# Patient Record
Sex: MALE | Race: WHITE | Employment: FULL TIME | URBAN - METROPOLITAN AREA
[De-identification: names, ages, dates, MRNs, and addresses within clinical notes are randomized per-mention and may not be internally consistent; named-entity substitution may affect disease eponyms.]

---

## 2017-01-30 ENCOUNTER — ANTI-COAG VISIT (OUTPATIENT)
Dept: FAMILY MEDICINE CLINIC | Facility: CLINIC | Age: 62
End: 2017-01-30

## 2017-01-30 DIAGNOSIS — Z79.01 LONG TERM (CURRENT) USE OF ANTICOAGULANTS: Primary | ICD-10-CM

## 2017-01-30 DIAGNOSIS — I48.91 ATRIAL FIBRILLATION, UNSPECIFIED TYPE (HCC): ICD-10-CM

## 2017-01-30 LAB — INR: 2.6 (ref 0.8–1.2)

## 2017-01-30 PROCEDURE — 99211 OFF/OP EST MAY X REQ PHY/QHP: CPT | Performed by: FAMILY MEDICINE

## 2017-01-30 PROCEDURE — 85610 PROTHROMBIN TIME: CPT | Performed by: FAMILY MEDICINE

## 2017-02-06 ENCOUNTER — TELEPHONE (OUTPATIENT)
Dept: FAMILY MEDICINE CLINIC | Facility: CLINIC | Age: 62
End: 2017-02-06

## 2017-02-06 ENCOUNTER — NURSE ONLY (OUTPATIENT)
Dept: FAMILY MEDICINE CLINIC | Facility: CLINIC | Age: 62
End: 2017-02-06

## 2017-02-06 DIAGNOSIS — Z23 IMMUNIZATION DUE: Primary | ICD-10-CM

## 2017-02-06 PROCEDURE — 90715 TDAP VACCINE 7 YRS/> IM: CPT | Performed by: FAMILY MEDICINE

## 2017-02-06 PROCEDURE — 90471 IMMUNIZATION ADMIN: CPT | Performed by: FAMILY MEDICINE

## 2017-02-06 RX ORDER — LISINOPRIL 10 MG/1
10 TABLET ORAL DAILY
COMMUNITY
Start: 2011-04-06 | End: 2017-10-04

## 2017-02-06 RX ORDER — MELOXICAM 15 MG/1
15 TABLET ORAL DAILY
COMMUNITY
Start: 2016-08-26 | End: 2017-03-06 | Stop reason: ALTCHOICE

## 2017-02-06 RX ORDER — WARFARIN SODIUM 5 MG/1
5 TABLET ORAL
COMMUNITY
Start: 2014-02-05 | End: 2017-07-24

## 2017-02-06 RX ORDER — ROSUVASTATIN CALCIUM 10 MG/1
10 TABLET, COATED ORAL DAILY
COMMUNITY
Start: 2013-05-29 | End: 2017-05-22

## 2017-02-06 RX ORDER — OMEPRAZOLE 20 MG/1
CAPSULE, DELAYED RELEASE ORAL
Qty: 90 CAPSULE | Refills: 3 | Status: SHIPPED | OUTPATIENT
Start: 2017-02-06 | End: 2017-09-06

## 2017-02-06 RX ORDER — FLUTICASONE PROPIONATE 50 MCG
50 SPRAY, SUSPENSION (ML) NASAL
COMMUNITY
Start: 2015-12-21 | End: 2020-04-23 | Stop reason: ALTCHOICE

## 2017-02-06 RX ORDER — OMEPRAZOLE 20 MG/1
20 CAPSULE, DELAYED RELEASE ORAL DAILY
COMMUNITY
Start: 2009-08-15 | End: 2017-03-06

## 2017-02-06 RX ORDER — WARFARIN SODIUM 2.5 MG/1
2.5 TABLET ORAL
COMMUNITY
Start: 2016-01-04 | End: 2018-03-31

## 2017-02-06 RX ORDER — DILTIAZEM HYDROCHLORIDE 60 MG/1
60 TABLET, FILM COATED ORAL DAILY
COMMUNITY
Start: 2014-02-05 | End: 2017-10-04

## 2017-02-06 RX ORDER — CITALOPRAM 20 MG/1
20 TABLET ORAL DAILY
COMMUNITY
Start: 2010-07-06 | End: 2017-03-12

## 2017-02-27 ENCOUNTER — LAB ENCOUNTER (OUTPATIENT)
Dept: LAB | Age: 62
End: 2017-02-27
Attending: FAMILY MEDICINE
Payer: COMMERCIAL

## 2017-02-27 ENCOUNTER — TELEPHONE (OUTPATIENT)
Dept: FAMILY MEDICINE CLINIC | Facility: CLINIC | Age: 62
End: 2017-02-27

## 2017-02-27 DIAGNOSIS — F32.A DEPRESSION: ICD-10-CM

## 2017-02-27 DIAGNOSIS — E78.5 HYPERLIPIDEMIA: Primary | ICD-10-CM

## 2017-02-27 DIAGNOSIS — I10 HYPERTENSION, BENIGN: ICD-10-CM

## 2017-02-27 DIAGNOSIS — E11.9 DIABETES MELLITUS TYPE II, CONTROLLED (HCC): ICD-10-CM

## 2017-02-27 LAB
ALBUMIN SERPL-MCNC: 3.6 G/DL (ref 3.5–4.8)
ALP LIVER SERPL-CCNC: 88 U/L (ref 45–117)
ALT SERPL-CCNC: 41 U/L (ref 17–63)
AST SERPL-CCNC: 13 U/L (ref 15–41)
BASOPHILS # BLD AUTO: 0.04 X10(3) UL (ref 0–0.1)
BASOPHILS NFR BLD AUTO: 0.5 %
BILIRUB SERPL-MCNC: 0.3 MG/DL (ref 0.1–2)
BUN BLD-MCNC: 16 MG/DL (ref 8–20)
CALCIUM BLD-MCNC: 8.4 MG/DL (ref 8.3–10.3)
CHLORIDE: 108 MMOL/L (ref 101–111)
CHOLEST SMN-MCNC: 137 MG/DL (ref ?–200)
CO2: 26 MMOL/L (ref 22–32)
CREAT BLD-MCNC: 0.86 MG/DL (ref 0.7–1.3)
CREAT UR-SCNC: 136 MG/DL
EOSINOPHIL # BLD AUTO: 0.15 X10(3) UL (ref 0–0.3)
EOSINOPHIL NFR BLD AUTO: 2 %
ERYTHROCYTE [DISTWIDTH] IN BLOOD BY AUTOMATED COUNT: 16.1 % (ref 11.5–16)
EST. AVERAGE GLUCOSE BLD GHB EST-MCNC: 143 MG/DL (ref 68–126)
GLUCOSE BLD-MCNC: 116 MG/DL (ref 70–99)
HBA1C MFR BLD HPLC: 6.6 % (ref ?–5.7)
HCT VFR BLD AUTO: 47.3 % (ref 37–53)
HDLC SERPL-MCNC: 46 MG/DL (ref 45–?)
HDLC SERPL: 2.98 {RATIO} (ref ?–4.97)
HGB BLD-MCNC: 14.9 G/DL (ref 13–17)
IMMATURE GRANULOCYTE COUNT: 0.03 X10(3) UL (ref 0–1)
IMMATURE GRANULOCYTE RATIO %: 0.4 %
INR BLD: 1.88 (ref 0.89–1.12)
LDLC SERPL CALC-MCNC: 50 MG/DL (ref ?–130)
LYMPHOCYTES # BLD AUTO: 1.75 X10(3) UL (ref 0.9–4)
LYMPHOCYTES NFR BLD AUTO: 23.5 %
M PROTEIN MFR SERPL ELPH: 7.1 G/DL (ref 6.1–8.3)
MCH RBC QN AUTO: 25.3 PG (ref 27–33.2)
MCHC RBC AUTO-ENTMCNC: 31.5 G/DL (ref 31–37)
MCV RBC AUTO: 80.4 FL (ref 80–99)
MICROALBUMIN UR-MCNC: 0.68 MG/DL
MICROALBUMIN/CREAT 24H UR-RTO: 5 UG/MG (ref ?–30)
MONOCYTES # BLD AUTO: 0.62 X10(3) UL (ref 0.1–0.6)
MONOCYTES NFR BLD AUTO: 8.3 %
NEUTROPHIL ABS PRELIM: 4.85 X10 (3) UL (ref 1.3–6.7)
NEUTROPHILS # BLD AUTO: 4.85 X10(3) UL (ref 1.3–6.7)
NEUTROPHILS NFR BLD AUTO: 65.3 %
NONHDLC SERPL-MCNC: 91 MG/DL (ref ?–130)
PLATELET # BLD AUTO: 226 10(3)UL (ref 150–450)
POTASSIUM SERPL-SCNC: 4.2 MMOL/L (ref 3.6–5.1)
PSA SERPL DL<=0.01 NG/ML-MCNC: 22.3 SECONDS (ref 12.3–14.8)
RBC # BLD AUTO: 5.88 X10(6)UL (ref 4.3–5.7)
RED CELL DISTRIBUTION WIDTH-SD: 46.5 FL (ref 35.1–46.3)
SODIUM SERPL-SCNC: 144 MMOL/L (ref 136–144)
TRIGLYCERIDES: 206 MG/DL (ref ?–150)
TSI SER-ACNC: 1.98 MIU/ML (ref 0.35–5.5)
URIC ACID: 5.3 MG/DL (ref 2.4–8.7)
VLDL: 41 MG/DL (ref 5–40)
WBC # BLD AUTO: 7.4 X10(3) UL (ref 4–13)

## 2017-02-27 PROCEDURE — 80061 LIPID PANEL: CPT

## 2017-02-27 PROCEDURE — 84550 ASSAY OF BLOOD/URIC ACID: CPT

## 2017-02-27 PROCEDURE — 85025 COMPLETE CBC W/AUTO DIFF WBC: CPT

## 2017-02-27 PROCEDURE — 84443 ASSAY THYROID STIM HORMONE: CPT

## 2017-02-27 PROCEDURE — 82570 ASSAY OF URINE CREATININE: CPT

## 2017-02-27 PROCEDURE — 82043 UR ALBUMIN QUANTITATIVE: CPT

## 2017-02-27 PROCEDURE — 83036 HEMOGLOBIN GLYCOSYLATED A1C: CPT

## 2017-02-27 PROCEDURE — 80053 COMPREHEN METABOLIC PANEL: CPT

## 2017-02-27 PROCEDURE — 85610 PROTHROMBIN TIME: CPT

## 2017-02-27 NOTE — TELEPHONE ENCOUNTER
Patient informed of INR results. Patient has upcoming appt Monday 3/6. Informed will discuss other test results at that appt/agreed.

## 2017-02-27 NOTE — TELEPHONE ENCOUNTER
----- Message from Fina Caldera MD sent at 2/27/2017  4:13 PM CST -----  Please call Chuy Holm. His INR is 1.88. No changes in his medications. Please recheck it in 2 weeks. His Hgba1c is good at 6.6.

## 2017-02-28 ENCOUNTER — ANTI-COAG VISIT (OUTPATIENT)
Dept: FAMILY MEDICINE CLINIC | Facility: CLINIC | Age: 62
End: 2017-02-28

## 2017-02-28 DIAGNOSIS — Z79.01 LONG TERM (CURRENT) USE OF ANTICOAGULANTS: Primary | ICD-10-CM

## 2017-02-28 DIAGNOSIS — I48.91 ATRIAL FIBRILLATION, UNSPECIFIED TYPE (HCC): ICD-10-CM

## 2017-02-28 NOTE — PROGRESS NOTES
INR just slightly below INR, but has recently just finnished antibiotic. Per Dr. Mckenzie Cavazos written order from yesterday patient was to CPM coumadin and return for INR in 2 weeks. Patient notified of orders.  In 2 weeks Coumadin clinic is closed ( for entir

## 2017-03-03 ENCOUNTER — TELEPHONE (OUTPATIENT)
Dept: FAMILY MEDICINE CLINIC | Facility: CLINIC | Age: 62
End: 2017-03-03

## 2017-03-03 NOTE — TELEPHONE ENCOUNTER
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 Last 2 visits faxed to [phone].   Eduardo Mata, 03/03/2017, 2:45 PM

## 2017-03-03 NOTE — TELEPHONE ENCOUNTER
----- Message from Enrico Arellano sent at 3/3/2017 12:16 PM CST -----  Didn't get the INR numbers, need those to approve DOT Physical

## 2017-03-04 ENCOUNTER — TELEPHONE (OUTPATIENT)
Dept: FAMILY MEDICINE CLINIC | Facility: CLINIC | Age: 62
End: 2017-03-04

## 2017-03-06 ENCOUNTER — OFFICE VISIT (OUTPATIENT)
Dept: FAMILY MEDICINE CLINIC | Facility: CLINIC | Age: 62
End: 2017-03-06

## 2017-03-06 ENCOUNTER — ANTI-COAG VISIT (OUTPATIENT)
Dept: FAMILY MEDICINE CLINIC | Facility: CLINIC | Age: 62
End: 2017-03-06

## 2017-03-06 VITALS
HEIGHT: 71 IN | TEMPERATURE: 97 F | WEIGHT: 264 LBS | RESPIRATION RATE: 16 BRPM | BODY MASS INDEX: 36.96 KG/M2 | SYSTOLIC BLOOD PRESSURE: 114 MMHG | HEART RATE: 72 BPM | DIASTOLIC BLOOD PRESSURE: 70 MMHG

## 2017-03-06 DIAGNOSIS — K21.00 GASTROESOPHAGEAL REFLUX DISEASE WITH ESOPHAGITIS: ICD-10-CM

## 2017-03-06 DIAGNOSIS — F32.A DEPRESSION, UNSPECIFIED DEPRESSION TYPE: ICD-10-CM

## 2017-03-06 DIAGNOSIS — E78.49 FAMILIAL MULTIPLE LIPOPROTEIN-TYPE HYPERLIPIDEMIA: ICD-10-CM

## 2017-03-06 DIAGNOSIS — I10 BENIGN ESSENTIAL HYPERTENSION: ICD-10-CM

## 2017-03-06 DIAGNOSIS — I48.91 ATRIAL FIBRILLATION, UNSPECIFIED TYPE (HCC): ICD-10-CM

## 2017-03-06 DIAGNOSIS — E11.9 CONTROLLED TYPE 2 DIABETES MELLITUS WITHOUT COMPLICATION, WITHOUT LONG-TERM CURRENT USE OF INSULIN (HCC): Primary | ICD-10-CM

## 2017-03-06 DIAGNOSIS — Z79.01 LONG TERM (CURRENT) USE OF ANTICOAGULANTS: Primary | ICD-10-CM

## 2017-03-06 LAB — INR: 2 (ref 0.8–1.2)

## 2017-03-06 PROCEDURE — 99214 OFFICE O/P EST MOD 30 MIN: CPT | Performed by: FAMILY MEDICINE

## 2017-03-06 PROCEDURE — 85610 PROTHROMBIN TIME: CPT | Performed by: FAMILY MEDICINE

## 2017-03-06 NOTE — PROGRESS NOTES
[facility] [place]  PROGRESS NOTE  Chief Complaint:   Patient presents with:  Diabetes  Follow - Up      HPI:   This is a 63year old male coming in for follow-up of his diabetes. He has been feeling good. No new concerns now.         Results f Prelim 4.85 1.30-6.70 x10 (3) uL   Neutrophil Absolute 4.85 1.30-6.70 x10(3) uL   Lymphocyte Absolute 1.75 0.90-4.00 x10(3) uL   Monocyte Absolute 0.62 (H) 0.10-0.60 x10(3) uL   Eosinophil Absolute 0.15 0.00-0.30 x10(3) uL   Basophil Absolute 0.04 0.00-0.1 (ONETOUCH ULTRA BLUE) In Vitro Strip  Disp:  Rfl:    Insulin Pen Needle (BD PEN NEEDLE SHORT U/F) 31G X 8 MM Does not apply Misc  Disp:  Rfl:    ONETOUCH LANCETS Does not apply Misc  Disp:  Rfl:    Liraglutide (VICTOZA) 18 MG/3ML Subcutaneous Solution Pen- extremities,change in bowel or bladder control. HEMATOLOGIC:  Denies anemia, bleeding or bruising. LYMPHATICS:  Denies enlarged nodes or history of splenectomy. PSYCHIATRIC:  Denies depression or anxiety.   ENDOCRINOLOGIC:  Denies excessive sweating, col Familial multiple lipoprotein-type hyperlipidemia  His cholesterol is normal.  However his triglycerides are elevated. We did discuss dietary changes for this.     3. Benign essential hypertension  His blood pressure is normal.    4. Depression, unspecifie

## 2017-03-13 RX ORDER — CITALOPRAM 20 MG/1
TABLET ORAL
Qty: 90 TABLET | Refills: 3 | Status: SHIPPED | OUTPATIENT
Start: 2017-03-13 | End: 2017-09-06

## 2017-03-27 ENCOUNTER — ANTI-COAG VISIT (OUTPATIENT)
Dept: FAMILY MEDICINE CLINIC | Facility: CLINIC | Age: 62
End: 2017-03-27

## 2017-03-27 DIAGNOSIS — Z79.01 LONG TERM (CURRENT) USE OF ANTICOAGULANTS: Primary | ICD-10-CM

## 2017-03-27 DIAGNOSIS — I48.91 ATRIAL FIBRILLATION, UNSPECIFIED TYPE (HCC): ICD-10-CM

## 2017-03-27 LAB — INR: 2.7 (ref 0.8–1.2)

## 2017-03-27 PROCEDURE — 85610 PROTHROMBIN TIME: CPT | Performed by: FAMILY MEDICINE

## 2017-03-27 PROCEDURE — 99211 OFF/OP EST MAY X REQ PHY/QHP: CPT | Performed by: FAMILY MEDICINE

## 2017-04-10 ENCOUNTER — TELEPHONE (OUTPATIENT)
Dept: FAMILY MEDICINE CLINIC | Facility: CLINIC | Age: 62
End: 2017-04-10

## 2017-04-10 NOTE — TELEPHONE ENCOUNTER
Call [facility] for Patient regarding Victoza.   Ryan May, 04/10/2017, 12:55 PM

## 2017-04-10 NOTE — TELEPHONE ENCOUNTER
Patient phoned Customer Care at [facility]. Victoza to expensive regardless of Medication on preferred  List.    Advised to ask Dr Perri Burnett to switch from Victoza to Tanzeum. CoPay much lower. Please advise.   Winston Waldron, 04/10/2017, 2:05 PM

## 2017-04-10 NOTE — TELEPHONE ENCOUNTER
Phoned Indra regarding Patient's Victoza. Patient states His CoPay is to high. [employer] states Victoza is on the Preferred list.  PriorAuthorization gave Me a phone number to Customer Care [phone].   Phoned Patient and gave number to Him--He timothy

## 2017-04-10 NOTE — TELEPHONE ENCOUNTER
----- Message from Surendra Mccrary sent at 4/10/2017  1:33 PM CDT -----  Contact: Russell Casanova     [phone]  [facility]

## 2017-04-24 ENCOUNTER — ANTI-COAG VISIT (OUTPATIENT)
Dept: FAMILY MEDICINE CLINIC | Facility: CLINIC | Age: 62
End: 2017-04-24

## 2017-04-24 VITALS
TEMPERATURE: 98 F | RESPIRATION RATE: 14 BRPM | DIASTOLIC BLOOD PRESSURE: 80 MMHG | SYSTOLIC BLOOD PRESSURE: 128 MMHG | HEART RATE: 54 BPM

## 2017-04-24 DIAGNOSIS — Z79.01 LONG TERM (CURRENT) USE OF ANTICOAGULANTS: Primary | ICD-10-CM

## 2017-04-24 DIAGNOSIS — I48.91 ATRIAL FIBRILLATION, UNSPECIFIED TYPE (HCC): ICD-10-CM

## 2017-04-24 PROCEDURE — 85610 PROTHROMBIN TIME: CPT | Performed by: FAMILY MEDICINE

## 2017-04-24 PROCEDURE — 99211 OFF/OP EST MAY X REQ PHY/QHP: CPT | Performed by: FAMILY MEDICINE

## 2017-05-22 ENCOUNTER — TELEPHONE (OUTPATIENT)
Dept: FAMILY MEDICINE CLINIC | Facility: CLINIC | Age: 62
End: 2017-05-22

## 2017-05-22 RX ORDER — ROSUVASTATIN CALCIUM 10 MG/1
TABLET, COATED ORAL
Qty: 90 TABLET | Refills: 3 | Status: SHIPPED | OUTPATIENT
Start: 2017-05-22 | End: 2017-09-06

## 2017-05-22 NOTE — TELEPHONE ENCOUNTER
Future Appointments  Date Time Provider Anayeli Sharri   6/5/2017 11:30 AM [facility] COUMADIN NURSE [facility] [place] [facility] [place]   8/28/2017 8:15 AM REF [place] REF [facility]   9/6/2017 5:30 PM Litzy De La Paz MD [facility] [place] [facility] [address]

## 2017-06-05 ENCOUNTER — ANTI-COAG VISIT (OUTPATIENT)
Dept: FAMILY MEDICINE CLINIC | Facility: CLINIC | Age: 62
End: 2017-06-05

## 2017-06-05 VITALS
DIASTOLIC BLOOD PRESSURE: 70 MMHG | OXYGEN SATURATION: 99 % | SYSTOLIC BLOOD PRESSURE: 118 MMHG | TEMPERATURE: 98 F | HEART RATE: 70 BPM

## 2017-06-05 DIAGNOSIS — I48.91 ATRIAL FIBRILLATION, UNSPECIFIED TYPE (HCC): ICD-10-CM

## 2017-06-05 DIAGNOSIS — Z79.01 LONG TERM (CURRENT) USE OF ANTICOAGULANTS: Primary | ICD-10-CM

## 2017-06-05 PROCEDURE — 99211 OFF/OP EST MAY X REQ PHY/QHP: CPT | Performed by: FAMILY MEDICINE

## 2017-06-05 PROCEDURE — 85610 PROTHROMBIN TIME: CPT | Performed by: FAMILY MEDICINE

## 2017-06-05 NOTE — PROGRESS NOTES
INR is slightly low at 1.9. Missed coumadin dose this week. Avoid leafy green vegetables this week. CPM coumadin. INR in one month. AVS printed and given to patient and/or family member.    Coumadin instructions explained and patient and/or family

## 2017-06-05 NOTE — PATIENT INSTRUCTIONS
INR is just slightly low at 1.9. Avoid leafy green vegetables this week. Continue same coumadin dose. Watch for bleeding such as black tarry stool, bloody stool, blood in urine, unusual bruising or significant nose bleeds.  Please call if these symptom

## 2017-06-26 ENCOUNTER — TELEPHONE (OUTPATIENT)
Dept: FAMILY MEDICINE CLINIC | Facility: CLINIC | Age: 62
End: 2017-06-26

## 2017-06-26 ENCOUNTER — ANTI-COAG VISIT (OUTPATIENT)
Dept: FAMILY MEDICINE CLINIC | Facility: CLINIC | Age: 62
End: 2017-06-26

## 2017-06-26 DIAGNOSIS — Z79.01 LONG TERM (CURRENT) USE OF ANTICOAGULANTS: ICD-10-CM

## 2017-06-26 DIAGNOSIS — I48.91 ATRIAL FIBRILLATION, UNSPECIFIED TYPE (HCC): ICD-10-CM

## 2017-06-26 PROCEDURE — 99211 OFF/OP EST MAY X REQ PHY/QHP: CPT | Performed by: FAMILY MEDICINE

## 2017-06-26 PROCEDURE — 85610 PROTHROMBIN TIME: CPT | Performed by: FAMILY MEDICINE

## 2017-06-26 NOTE — TELEPHONE ENCOUNTER
States he just finished with  Appointment at [address]. Patient states Doctor drained 90cc of blood from knee. Ortho requesting patient to have INR checked today. Appointment given.

## 2017-06-26 NOTE — PROGRESS NOTES
INR too low at 1.5. Patient states ortho drained \"90cc\" of blood from right knee today. Missed coumadin dose last evening. Discussed with Dr. Tia Walsh and he advised resume coumadin at his normal dose. INR in one week.      AVS printed and given

## 2017-06-26 NOTE — PATIENT INSTRUCTIONS
INR is too low at 1.5. Continue coumadin at your normal dose. INR in one week. Watch for bleeding such as black tarry stool, bloody stool, blood in urine, unusual bruising or significant nose bleeds. Please call if these symptoms occur.    If you st

## 2017-07-03 ENCOUNTER — ANTI-COAG VISIT (OUTPATIENT)
Dept: FAMILY MEDICINE CLINIC | Facility: CLINIC | Age: 62
End: 2017-07-03

## 2017-07-03 DIAGNOSIS — I48.91 ATRIAL FIBRILLATION, UNSPECIFIED TYPE (HCC): ICD-10-CM

## 2017-07-03 DIAGNOSIS — Z79.01 LONG TERM (CURRENT) USE OF ANTICOAGULANTS: ICD-10-CM

## 2017-07-03 LAB — INR: 2.3 (ref 0.8–1.2)

## 2017-07-03 PROCEDURE — 85610 PROTHROMBIN TIME: CPT | Performed by: FAMILY MEDICINE

## 2017-07-03 PROCEDURE — 99211 OFF/OP EST MAY X REQ PHY/QHP: CPT | Performed by: FAMILY MEDICINE

## 2017-07-03 NOTE — PROGRESS NOTES
INR in goal range at 2.3. CPM coumadin. INR in one month. AVS printed and given to patient and/or family member. Coumadin instructions explained and patient and/or family member and/or care giver verbalize understanding.

## 2017-07-24 RX ORDER — WARFARIN SODIUM 5 MG/1
TABLET ORAL
Qty: 105 TABLET | Refills: 3 | Status: SHIPPED | OUTPATIENT
Start: 2017-07-24 | End: 2018-08-05

## 2017-07-31 ENCOUNTER — ANTI-COAG VISIT (OUTPATIENT)
Dept: FAMILY MEDICINE CLINIC | Facility: CLINIC | Age: 62
End: 2017-07-31

## 2017-07-31 ENCOUNTER — TELEPHONE (OUTPATIENT)
Dept: FAMILY MEDICINE CLINIC | Facility: CLINIC | Age: 62
End: 2017-07-31

## 2017-07-31 VITALS
SYSTOLIC BLOOD PRESSURE: 118 MMHG | DIASTOLIC BLOOD PRESSURE: 70 MMHG | RESPIRATION RATE: 18 BRPM | TEMPERATURE: 98 F | HEART RATE: 54 BPM

## 2017-07-31 DIAGNOSIS — I48.91 ATRIAL FIBRILLATION, UNSPECIFIED TYPE (HCC): ICD-10-CM

## 2017-07-31 DIAGNOSIS — Z79.01 LONG TERM (CURRENT) USE OF ANTICOAGULANTS: ICD-10-CM

## 2017-07-31 LAB — INR: 2.7 (ref 0.8–1.2)

## 2017-07-31 PROCEDURE — 99211 OFF/OP EST MAY X REQ PHY/QHP: CPT | Performed by: FAMILY MEDICINE

## 2017-07-31 PROCEDURE — 85610 PROTHROMBIN TIME: CPT | Performed by: FAMILY MEDICINE

## 2017-07-31 NOTE — PROGRESS NOTES
INR in goal range at 2.7. CPM coumadin. INR in one month. Watch for bleeding such as black tarry stool, bloody stool, blood in urine, unusual bruising or significant nose bleeds. Please call if these symptoms occur.    If you start a new medication, dev

## 2017-07-31 NOTE — TELEPHONE ENCOUNTER
Phoned Patient to see why He cancelled todays appt with Vidhya. States He had only wanted to see Dr Roxana Mendoza.   Also states He did not call Cardiologist.    This am Patient was here for INR with Reyna Jernigan.  He stated for the past week He had been having heart p

## 2017-08-01 ENCOUNTER — OFFICE VISIT (OUTPATIENT)
Dept: FAMILY MEDICINE CLINIC | Facility: CLINIC | Age: 62
End: 2017-08-01

## 2017-08-01 ENCOUNTER — LAB ENCOUNTER (OUTPATIENT)
Dept: LAB | Age: 62
End: 2017-08-01
Attending: NURSE PRACTITIONER
Payer: COMMERCIAL

## 2017-08-01 VITALS
DIASTOLIC BLOOD PRESSURE: 70 MMHG | BODY MASS INDEX: 36.68 KG/M2 | SYSTOLIC BLOOD PRESSURE: 110 MMHG | TEMPERATURE: 98 F | OXYGEN SATURATION: 98 % | HEIGHT: 71 IN | HEART RATE: 64 BPM | RESPIRATION RATE: 20 BRPM | WEIGHT: 262 LBS

## 2017-08-01 DIAGNOSIS — R22.2 LUMP IN CHEST: ICD-10-CM

## 2017-08-01 DIAGNOSIS — R07.89 CHEST TIGHTNESS: ICD-10-CM

## 2017-08-01 DIAGNOSIS — R00.2 HEART PALPITATIONS: ICD-10-CM

## 2017-08-01 DIAGNOSIS — R00.2 HEART PALPITATIONS: Primary | ICD-10-CM

## 2017-08-01 LAB
BASOPHILS # BLD AUTO: 0.04 X10(3) UL (ref 0–0.1)
BASOPHILS NFR BLD AUTO: 0.6 %
BUN BLD-MCNC: 18 MG/DL (ref 8–20)
CALCIUM BLD-MCNC: 9.2 MG/DL (ref 8.3–10.3)
CHLORIDE: 106 MMOL/L (ref 101–111)
CO2: 27 MMOL/L (ref 22–32)
CREAT BLD-MCNC: 0.82 MG/DL (ref 0.7–1.3)
EOSINOPHIL # BLD AUTO: 0.14 X10(3) UL (ref 0–0.3)
EOSINOPHIL NFR BLD AUTO: 2.2 %
ERYTHROCYTE [DISTWIDTH] IN BLOOD BY AUTOMATED COUNT: 15.1 % (ref 11.5–16)
FREE T4: 1 NG/DL (ref 0.9–1.8)
GLUCOSE BLD-MCNC: 146 MG/DL (ref 70–99)
HAV IGM SER QL: 2.1 MG/DL (ref 1.7–3)
HCT VFR BLD AUTO: 45.2 % (ref 37–53)
HGB BLD-MCNC: 14.2 G/DL (ref 13–17)
IMMATURE GRANULOCYTE COUNT: 0.03 X10(3) UL (ref 0–1)
IMMATURE GRANULOCYTE RATIO %: 0.5 %
LYMPHOCYTES # BLD AUTO: 1.86 X10(3) UL (ref 0.9–4)
LYMPHOCYTES NFR BLD AUTO: 28.6 %
MCH RBC QN AUTO: 25.1 PG (ref 27–33.2)
MCHC RBC AUTO-ENTMCNC: 31.4 G/DL (ref 31–37)
MCV RBC AUTO: 80 FL (ref 80–99)
MONOCYTES # BLD AUTO: 0.56 X10(3) UL (ref 0.1–0.6)
MONOCYTES NFR BLD AUTO: 8.6 %
NEUTROPHIL ABS PRELIM: 3.87 X10 (3) UL (ref 1.3–6.7)
NEUTROPHILS # BLD AUTO: 3.87 X10(3) UL (ref 1.3–6.7)
NEUTROPHILS NFR BLD AUTO: 59.5 %
PHOSPHATE SERPL-MCNC: 3 MG/DL (ref 2.5–4.9)
PLATELET # BLD AUTO: 203 10(3)UL (ref 150–450)
POTASSIUM SERPL-SCNC: 3.9 MMOL/L (ref 3.6–5.1)
RBC # BLD AUTO: 5.65 X10(6)UL (ref 4.3–5.7)
RED CELL DISTRIBUTION WIDTH-SD: 43.5 FL (ref 35.1–46.3)
SODIUM SERPL-SCNC: 142 MMOL/L (ref 136–144)
TSI SER-ACNC: 2.75 MIU/ML (ref 0.35–5.5)
WBC # BLD AUTO: 6.5 X10(3) UL (ref 4–13)

## 2017-08-01 PROCEDURE — 84100 ASSAY OF PHOSPHORUS: CPT

## 2017-08-01 PROCEDURE — 83735 ASSAY OF MAGNESIUM: CPT

## 2017-08-01 PROCEDURE — 99214 OFFICE O/P EST MOD 30 MIN: CPT | Performed by: NURSE PRACTITIONER

## 2017-08-01 PROCEDURE — 80048 BASIC METABOLIC PNL TOTAL CA: CPT

## 2017-08-01 PROCEDURE — 84439 ASSAY OF FREE THYROXINE: CPT

## 2017-08-01 PROCEDURE — 93000 ELECTROCARDIOGRAM COMPLETE: CPT | Performed by: NURSE PRACTITIONER

## 2017-08-01 PROCEDURE — 85025 COMPLETE CBC W/AUTO DIFF WBC: CPT

## 2017-08-01 PROCEDURE — 84443 ASSAY THYROID STIM HORMONE: CPT

## 2017-08-01 PROCEDURE — 36415 COLL VENOUS BLD VENIPUNCTURE: CPT

## 2017-08-01 NOTE — PATIENT INSTRUCTIONS
Blood work today. EKG today. Mammogram with Ultrasound ordered. This can be done at [facility].  To schedule an appointment for your radiology test please call [facility] patient scheduling at [phone]  Cardiology appt with Dr. Tamiko Cardenas today at 3pm.

## 2017-08-01 NOTE — PROGRESS NOTES
[address] NOTE  Nikkie Miguel is a 63year old male.     Chief Complaint:  Patient presents with:  Dizziness:  heart seemed out of rythm last week no dizziness now since Saturday    HPI:   Patient presents to office visit with c reflux    • Essential hypertension    • Hyperlipidemia    • Obesity    • S/p small bowel obstruction      Past Surgical History:  No date: APPENDECTOMY  No date: BACK SURGERY  No date: COLONOSCOPY  2010: HAND ORTHOSIS, METACARPAL FRACTURE ORTHOSIS, P*  02/ Warfarin Sodium (COUMADIN) 2.5 MG Oral Tab Take 2.5 mg by mouth.  One tablet Sun,Tues,Wed,Thurs,Fri,Sat  Disp:  Rfl:    CITALOPRAM HYDROBROMIDE 20 MG Oral Tab TAKE 1 TABLET BY MOUTH EVERY DAY Disp: 90 tablet Rfl: 3   Fluticasone Propionate (FLONASE ALLERG Comfort care instructions as listed in Patient Instructions    Meds & Refills for this Visit:    No prescriptions requested or ordered in this encounter    Risks, benefits, side effects of medication addressed and explained.     Patient Instructions   Blood

## 2017-08-03 ENCOUNTER — TELEPHONE (OUTPATIENT)
Dept: FAMILY MEDICINE CLINIC | Facility: CLINIC | Age: 62
End: 2017-08-03

## 2017-08-03 NOTE — TELEPHONE ENCOUNTER
----- Message from RUIZ Schmid sent at 8/3/2017 10:35 AM CDT -----  BMP essentially normal, pt not fasting for labs. Electrolytes stable.    TSH/FT4 normal  CBC essentially normal.  Mag and phos levels normal.  Please assess how pt is doing, saw cardio

## 2017-08-09 ENCOUNTER — TELEPHONE (OUTPATIENT)
Dept: FAMILY MEDICINE CLINIC | Facility: CLINIC | Age: 62
End: 2017-08-09

## 2017-08-09 DIAGNOSIS — R92.8 ABNORMAL MAMMOGRAM: ICD-10-CM

## 2017-08-09 DIAGNOSIS — N63.20 BREAST MASS, LEFT: Primary | ICD-10-CM

## 2017-08-09 NOTE — TELEPHONE ENCOUNTER
Patient informed of the below results and recommendations. Patient states he is scheduled to see Dr. Jeremy Ambriz on 8/21/2017.  Sebastian Buck, 08/09/17, 12:17 PM

## 2017-08-09 NOTE — TELEPHONE ENCOUNTER
RUIZ Roldan, saw patient and ordered mammogram that shows a highly suspicious area. Recommend surgical consult as soon as possible. Please let patient know and send referral to surgeon of his choice.

## 2017-08-14 ENCOUNTER — TELEPHONE (OUTPATIENT)
Dept: FAMILY MEDICINE CLINIC | Facility: CLINIC | Age: 62
End: 2017-08-14

## 2017-08-14 NOTE — TELEPHONE ENCOUNTER
Patient states He has been having a Sharp Stabbing Pain on the right side of His head  Off/On all day. States pain is especially bad when He yells at His GrandDaughter. States His mood in the past 2 days has worsened.     Patient seems to be slower with

## 2017-08-15 ENCOUNTER — TELEPHONE (OUTPATIENT)
Dept: FAMILY MEDICINE CLINIC | Facility: CLINIC | Age: 62
End: 2017-08-15

## 2017-08-15 ENCOUNTER — ANTI-COAG VISIT (OUTPATIENT)
Dept: FAMILY MEDICINE CLINIC | Facility: CLINIC | Age: 62
End: 2017-08-15

## 2017-08-15 DIAGNOSIS — Z79.01 LONG TERM (CURRENT) USE OF ANTICOAGULANTS: ICD-10-CM

## 2017-08-15 DIAGNOSIS — I48.91 ATRIAL FIBRILLATION, UNSPECIFIED TYPE (HCC): ICD-10-CM

## 2017-08-15 NOTE — TELEPHONE ENCOUNTER
FYI: Calling to report he was transferred from [facility] ER to [facility] for suspected brain hemorrhage. Coumadin held yesterday. No bleeding found and patient has been advised by discharging Doctor to resume coumadin today and to check INR on Monday 8/21/17.    INR

## 2017-08-15 NOTE — PROGRESS NOTES
Patient called stating he was transferred from [facility] to [facility] for suspected brain hemorrhage. Coumadin held yesterday. No bleeding found and patient will be resuming coumadin today. Discharging Doctor advised patient to schedule INR next Monday.  INR schedu

## 2017-08-16 ENCOUNTER — MED REC SCAN ONLY (OUTPATIENT)
Dept: FAMILY MEDICINE CLINIC | Facility: CLINIC | Age: 62
End: 2017-08-16

## 2017-08-18 ENCOUNTER — TELEPHONE (OUTPATIENT)
Dept: FAMILY MEDICINE CLINIC | Facility: CLINIC | Age: 62
End: 2017-08-18

## 2017-08-18 NOTE — TELEPHONE ENCOUNTER
Patient is a Male. Unable to enter Mammogram to flowsheet. Zuly Rosenthal, 08/18/17, 10:19 AM      Phoned Patient-  He has appt Monday 8/21/17 with Dr Paulina Palma.   Zuly Rosenthal, 08/18/17, 10:21 AM

## 2017-08-21 ENCOUNTER — ANTI-COAG VISIT (OUTPATIENT)
Dept: FAMILY MEDICINE CLINIC | Facility: CLINIC | Age: 62
End: 2017-08-21

## 2017-08-21 ENCOUNTER — TELEPHONE (OUTPATIENT)
Dept: FAMILY MEDICINE CLINIC | Facility: CLINIC | Age: 62
End: 2017-08-21

## 2017-08-21 VITALS
SYSTOLIC BLOOD PRESSURE: 108 MMHG | RESPIRATION RATE: 18 BRPM | DIASTOLIC BLOOD PRESSURE: 68 MMHG | TEMPERATURE: 98 F | HEART RATE: 60 BPM

## 2017-08-21 DIAGNOSIS — I48.91 ATRIAL FIBRILLATION, UNSPECIFIED TYPE (HCC): ICD-10-CM

## 2017-08-21 DIAGNOSIS — E11.9 CONTROLLED TYPE 2 DIABETES MELLITUS WITHOUT COMPLICATION, WITHOUT LONG-TERM CURRENT USE OF INSULIN (HCC): ICD-10-CM

## 2017-08-21 DIAGNOSIS — E78.49 FAMILIAL MULTIPLE LIPOPROTEIN-TYPE HYPERLIPIDEMIA: ICD-10-CM

## 2017-08-21 LAB — INR: 3 (ref 0.8–1.2)

## 2017-08-21 PROCEDURE — 85610 PROTHROMBIN TIME: CPT | Performed by: FAMILY MEDICINE

## 2017-08-21 PROCEDURE — 99211 OFF/OP EST MAY X REQ PHY/QHP: CPT | Performed by: FAMILY MEDICINE

## 2017-08-21 NOTE — TELEPHONE ENCOUNTER
----- Message from [facility] sent at 8/21/2017  2:27 PM CDT -----  Regarding: lab orders needed   Patient has lab appointment on 8/28/17 could you please put lab orders in system.         Thanks,  Roula

## 2017-08-21 NOTE — PROGRESS NOTES
INR at 3.0. Recent admit due to headache. Work up for brain hemorrhage negative. Coumadin due to suspected bleeding. Resumed coumadin a week ago, but took 10mg first two days.    Continue same coumadin dose ( already took coumadin today) 10mg M and 7.5mg

## 2017-08-21 NOTE — PATIENT INSTRUCTIONS
INR is in goal at 3.0. Eat a large salad, or other leafy green vegetable today. Continue same coumadin dose. INR in one week.

## 2017-08-28 ENCOUNTER — ANTI-COAG VISIT (OUTPATIENT)
Dept: FAMILY MEDICINE CLINIC | Facility: CLINIC | Age: 62
End: 2017-08-28

## 2017-08-28 ENCOUNTER — APPOINTMENT (OUTPATIENT)
Dept: LAB | Age: 62
End: 2017-08-28
Attending: FAMILY MEDICINE
Payer: COMMERCIAL

## 2017-08-28 ENCOUNTER — TELEPHONE (OUTPATIENT)
Dept: FAMILY MEDICINE CLINIC | Facility: CLINIC | Age: 62
End: 2017-08-28

## 2017-08-28 VITALS — SYSTOLIC BLOOD PRESSURE: 120 MMHG | DIASTOLIC BLOOD PRESSURE: 84 MMHG

## 2017-08-28 DIAGNOSIS — E78.49 FAMILIAL MULTIPLE LIPOPROTEIN-TYPE HYPERLIPIDEMIA: ICD-10-CM

## 2017-08-28 DIAGNOSIS — E11.9 CONTROLLED TYPE 2 DIABETES MELLITUS WITHOUT COMPLICATION, WITHOUT LONG-TERM CURRENT USE OF INSULIN (HCC): ICD-10-CM

## 2017-08-28 DIAGNOSIS — I48.91 ATRIAL FIBRILLATION, UNSPECIFIED TYPE (HCC): ICD-10-CM

## 2017-08-28 LAB
ALBUMIN SERPL-MCNC: 3.4 G/DL (ref 3.5–4.8)
ALP LIVER SERPL-CCNC: 96 U/L (ref 45–117)
ALT SERPL-CCNC: 37 U/L (ref 17–63)
AST SERPL-CCNC: 14 U/L (ref 15–41)
BILIRUB SERPL-MCNC: 0.3 MG/DL (ref 0.1–2)
BUN BLD-MCNC: 16 MG/DL (ref 8–20)
CALCIUM BLD-MCNC: 8.4 MG/DL (ref 8.3–10.3)
CHLORIDE: 106 MMOL/L (ref 101–111)
CO2: 28 MMOL/L (ref 22–32)
CREAT BLD-MCNC: 0.8 MG/DL (ref 0.7–1.3)
EST. AVERAGE GLUCOSE BLD GHB EST-MCNC: 171 MG/DL (ref 68–126)
GLUCOSE BLD-MCNC: 147 MG/DL (ref 70–99)
HBA1C MFR BLD HPLC: 7.6 % (ref ?–5.7)
INR: 3.3 (ref 0.8–1.2)
M PROTEIN MFR SERPL ELPH: 7.1 G/DL (ref 6.1–8.3)
POTASSIUM SERPL-SCNC: 3.9 MMOL/L (ref 3.6–5.1)
SODIUM SERPL-SCNC: 141 MMOL/L (ref 136–144)

## 2017-08-28 PROCEDURE — 85610 PROTHROMBIN TIME: CPT | Performed by: FAMILY MEDICINE

## 2017-08-28 PROCEDURE — 80053 COMPREHEN METABOLIC PANEL: CPT

## 2017-08-28 PROCEDURE — 83036 HEMOGLOBIN GLYCOSYLATED A1C: CPT

## 2017-08-28 PROCEDURE — 99211 OFF/OP EST MAY X REQ PHY/QHP: CPT | Performed by: FAMILY MEDICINE

## 2017-08-28 PROCEDURE — 36415 COLL VENOUS BLD VENIPUNCTURE: CPT

## 2017-08-28 NOTE — TELEPHONE ENCOUNTER
FYI: Upcoming biopsy of chest lesion on 9/11/17. Per patient surgeon requests patient to hold coumadin for 5 days preop. Currently on coumadin therapy for afib.      Patient has appt with Dr. Harris Fees on 9/6/17 and will discuss procedure and anticoagulati

## 2017-08-28 NOTE — PROGRESS NOTES
INR elevated at 3.3  Cortisone injection in knee 2 weeks ago  Eating less salads and broccoli  Upcoming biopsy of chest lesion on 6/11/17. Will need to hold coumadin x5 days preop per surgeon. Patient has appt with Dr. Smiley Serrano on 6/6/17 to discuss.

## 2017-09-05 ENCOUNTER — ANTI-COAG VISIT (OUTPATIENT)
Dept: FAMILY MEDICINE CLINIC | Facility: CLINIC | Age: 62
End: 2017-09-05

## 2017-09-05 VITALS — DIASTOLIC BLOOD PRESSURE: 78 MMHG | SYSTOLIC BLOOD PRESSURE: 118 MMHG | HEART RATE: 100 BPM

## 2017-09-05 LAB — INR: 2.9 (ref 0.8–1.2)

## 2017-09-05 PROCEDURE — 99211 OFF/OP EST MAY X REQ PHY/QHP: CPT | Performed by: FAMILY MEDICINE

## 2017-09-05 PROCEDURE — 85610 PROTHROMBIN TIME: CPT | Performed by: FAMILY MEDICINE

## 2017-09-05 NOTE — PATIENT INSTRUCTIONS
INR in goal range at 2.9. Take your regular 7.5mg of coumadin today. Then hold coumadin starting tomorrow for biopsy next week. Resume coumadin when advised by surgeon. INR  On 9/18/17.

## 2017-09-05 NOTE — PROGRESS NOTES
INR in goal range at 2.9. Will hold coumadin starting tomorrow for biopsy on 9/11/17  INR a week after resuming coumadin. Patient complaining of a stressful morning with grandchildren and felt chest pressure. B/P 118/78, P 100 regular.  Has not taken

## 2017-09-06 ENCOUNTER — OFFICE VISIT (OUTPATIENT)
Dept: FAMILY MEDICINE CLINIC | Facility: CLINIC | Age: 62
End: 2017-09-06

## 2017-09-06 VITALS
HEART RATE: 76 BPM | DIASTOLIC BLOOD PRESSURE: 74 MMHG | BODY MASS INDEX: 39.11 KG/M2 | RESPIRATION RATE: 16 BRPM | TEMPERATURE: 98 F | HEIGHT: 70 IN | SYSTOLIC BLOOD PRESSURE: 120 MMHG | WEIGHT: 273.19 LBS

## 2017-09-06 DIAGNOSIS — F32.A DEPRESSION, UNSPECIFIED DEPRESSION TYPE: ICD-10-CM

## 2017-09-06 DIAGNOSIS — E11.9 CONTROLLED TYPE 2 DIABETES MELLITUS WITHOUT COMPLICATION, WITHOUT LONG-TERM CURRENT USE OF INSULIN (HCC): Primary | ICD-10-CM

## 2017-09-06 DIAGNOSIS — I48.0 PAROXYSMAL ATRIAL FIBRILLATION (HCC): ICD-10-CM

## 2017-09-06 PROCEDURE — 99214 OFFICE O/P EST MOD 30 MIN: CPT | Performed by: FAMILY MEDICINE

## 2017-09-06 RX ORDER — OMEPRAZOLE 20 MG/1
20 CAPSULE, DELAYED RELEASE ORAL
Qty: 90 CAPSULE | Refills: 3 | Status: SHIPPED | OUTPATIENT
Start: 2017-09-06 | End: 2018-09-19

## 2017-09-06 RX ORDER — OMEPRAZOLE 20 MG/1
20 CAPSULE, DELAYED RELEASE ORAL DAILY
COMMUNITY
End: 2017-09-06

## 2017-09-06 RX ORDER — METHOCARBAMOL 500 MG/1
1 TABLET, FILM COATED ORAL 3 TIMES DAILY
Refills: 0 | COMMUNITY
Start: 2017-08-15 | End: 2020-04-23 | Stop reason: ALTCHOICE

## 2017-09-06 RX ORDER — ROSUVASTATIN CALCIUM 10 MG/1
10 TABLET, COATED ORAL NIGHTLY
Qty: 90 TABLET | Refills: 3 | Status: SHIPPED | OUTPATIENT
Start: 2017-09-06 | End: 2018-09-19

## 2017-09-06 RX ORDER — CITALOPRAM 20 MG/1
20 TABLET ORAL
Qty: 90 TABLET | Refills: 3 | Status: SHIPPED | OUTPATIENT
Start: 2017-09-06 | End: 2018-09-19

## 2017-09-06 NOTE — PROGRESS NOTES
[facility] [place]  PROGRESS NOTE  Chief Complaint:   Patient presents with:  Diabetes: Medication Refills  Follow - Up      HPI:   This is a 63year old male coming in for follow-up on his diabetes. This is a very difficult time in his life. achiness  Pantoprazole            Rash  Contrast  [Radiolog*      Metrizamide             Itching  Penicillins               Tramadol Hcl              Current Meds:    Current Outpatient Prescriptions:  Rosuvastatin Calcium 10 MG Oral Tab Take 1 tablet (10 itching, skin lesion, or excessive skin dryness. CARDIOVASCULAR:  Denies chest pain, chest pressure, chest discomfort, palpitations, edema, dyspnea on exertion or at rest.  RESPIRATORY:  Denies shortness of breath, wheezing, cough or sputum.   Alejandra Ribera bilterally, no rales/rhonchi/wheezing. ABDOMEN:  Soft, nondistended, nontender, bowel sounds normal in all 4 quadrants, no masses, no hepatosplenomegaly. BACK: No tenderness, no spasm, SLR test negative, FROM.   EXTREMITIES:  No edema, no cyanosis, no clu call with any side effects or complications from the treatments as a result of today.      Problem List:  Patient Active Problem List:     Long term (current) use of anticoagulants [Z79.01]     [facility] ([facility] [I48.91]     Long term current use of

## 2017-09-18 ENCOUNTER — ANTI-COAG VISIT (OUTPATIENT)
Dept: FAMILY MEDICINE CLINIC | Facility: CLINIC | Age: 62
End: 2017-09-18

## 2017-09-18 DIAGNOSIS — I48.91 ATRIAL FIBRILLATION, UNSPECIFIED TYPE (HCC): ICD-10-CM

## 2017-09-18 LAB — INR: 1.6 (ref 0.8–1.2)

## 2017-09-18 PROCEDURE — 99211 OFF/OP EST MAY X REQ PHY/QHP: CPT | Performed by: FAMILY MEDICINE

## 2017-09-18 PROCEDURE — 85610 PROTHROMBIN TIME: CPT | Performed by: FAMILY MEDICINE

## 2017-09-18 NOTE — PATIENT INSTRUCTIONS
INR low at 1.6. Take 10mg today, then resume 7.5mg daily. Next INR in 2 weeks. Watch for bleeding such as black tarry stool, bloody stool, blood in urine, unusual bruising or significant nose bleeds. Please call if these symptoms occur.    If you sta

## 2017-09-25 ENCOUNTER — NURSE ONLY (OUTPATIENT)
Dept: FAMILY MEDICINE CLINIC | Facility: CLINIC | Age: 62
End: 2017-09-25

## 2017-09-25 DIAGNOSIS — Z23 NEED FOR INFLUENZA VACCINATION: Primary | ICD-10-CM

## 2017-09-25 PROCEDURE — 90471 IMMUNIZATION ADMIN: CPT | Performed by: FAMILY MEDICINE

## 2017-09-25 PROCEDURE — 90686 IIV4 VACC NO PRSV 0.5 ML IM: CPT | Performed by: FAMILY MEDICINE

## 2017-10-02 ENCOUNTER — ANTI-COAG VISIT (OUTPATIENT)
Dept: FAMILY MEDICINE CLINIC | Facility: CLINIC | Age: 62
End: 2017-10-02

## 2017-10-02 VITALS — DIASTOLIC BLOOD PRESSURE: 70 MMHG | TEMPERATURE: 98 F | HEART RATE: 50 BPM | SYSTOLIC BLOOD PRESSURE: 122 MMHG

## 2017-10-02 PROCEDURE — 85610 PROTHROMBIN TIME: CPT | Performed by: FAMILY MEDICINE

## 2017-10-02 PROCEDURE — 99211 OFF/OP EST MAY X REQ PHY/QHP: CPT | Performed by: FAMILY MEDICINE

## 2017-10-04 RX ORDER — DILTIAZEM HYDROCHLORIDE 60 MG/1
TABLET, FILM COATED ORAL
Qty: 90 TABLET | Refills: 3 | Status: SHIPPED | OUTPATIENT
Start: 2017-10-04 | End: 2018-05-09

## 2017-10-04 RX ORDER — LISINOPRIL 10 MG/1
TABLET ORAL
Qty: 90 TABLET | Refills: 3 | Status: SHIPPED | OUTPATIENT
Start: 2017-10-04 | End: 2018-09-19

## 2017-10-04 NOTE — TELEPHONE ENCOUNTER
Future appt:     Your appointments     Date & Time Appointment Department [facility])    Oct 31, 2017  8:30 AM CDT Anti-Coag with [facility] [address] [place] [address],[address] [place] ([place] Gomez)    Feb 27, 2018  8:15 AM CST Labor

## 2017-10-31 ENCOUNTER — ANTI-COAG VISIT (OUTPATIENT)
Dept: FAMILY MEDICINE CLINIC | Facility: CLINIC | Age: 62
End: 2017-10-31

## 2017-10-31 VITALS
DIASTOLIC BLOOD PRESSURE: 68 MMHG | SYSTOLIC BLOOD PRESSURE: 118 MMHG | RESPIRATION RATE: 16 BRPM | TEMPERATURE: 98 F | HEART RATE: 60 BPM

## 2017-10-31 DIAGNOSIS — I48.91 ATRIAL FIBRILLATION, UNSPECIFIED TYPE (HCC): ICD-10-CM

## 2017-10-31 PROCEDURE — 99211 OFF/OP EST MAY X REQ PHY/QHP: CPT | Performed by: FAMILY MEDICINE

## 2017-10-31 PROCEDURE — 85610 PROTHROMBIN TIME: CPT | Performed by: FAMILY MEDICINE

## 2017-10-31 NOTE — PATIENT INSTRUCTIONS
INR is low at 1.5  10mg today, then resume 7.5mg daily. INR in one week. AVS printed and given to patient and/or family member. Coumadin instructions explained and patient and/or family member and/or care giver verbalize understanding.

## 2017-11-06 ENCOUNTER — ANTI-COAG VISIT (OUTPATIENT)
Dept: FAMILY MEDICINE CLINIC | Facility: CLINIC | Age: 62
End: 2017-11-06

## 2017-11-06 DIAGNOSIS — I48.91 ATRIAL FIBRILLATION, UNSPECIFIED TYPE (HCC): ICD-10-CM

## 2017-11-06 PROCEDURE — 85610 PROTHROMBIN TIME: CPT | Performed by: FAMILY MEDICINE

## 2017-11-06 PROCEDURE — 99211 OFF/OP EST MAY X REQ PHY/QHP: CPT | Performed by: FAMILY MEDICINE

## 2017-11-06 NOTE — PROGRESS NOTES
INR in goal at  2.4. Resume coumadin at 7.5mg daily  INR in 2 weeks. AVS printed and given to patient and/or family member. Coumadin instructions explained and patient and/or family member and/or care giver verbalize understanding.

## 2017-11-20 ENCOUNTER — TELEPHONE (OUTPATIENT)
Dept: FAMILY MEDICINE CLINIC | Facility: CLINIC | Age: 62
End: 2017-11-20

## 2017-11-27 ENCOUNTER — ANTI-COAG VISIT (OUTPATIENT)
Dept: FAMILY MEDICINE CLINIC | Facility: CLINIC | Age: 62
End: 2017-11-27

## 2017-11-27 DIAGNOSIS — Z79.01 LONG TERM (CURRENT) USE OF ANTICOAGULANTS: ICD-10-CM

## 2017-11-27 DIAGNOSIS — I48.91 ATRIAL FIBRILLATION, UNSPECIFIED TYPE (HCC): ICD-10-CM

## 2017-11-27 PROCEDURE — 85610 PROTHROMBIN TIME: CPT | Performed by: FAMILY MEDICINE

## 2017-11-27 PROCEDURE — 99211 OFF/OP EST MAY X REQ PHY/QHP: CPT | Performed by: FAMILY MEDICINE

## 2017-11-27 NOTE — PROGRESS NOTES
INR in goal at 2.6  CPM of coumadin 7.5mg daily  INR in one month. AVS printed and given to patient and/or family member. Coumadin instructions explained and patient and/or family member and/or care giver verbalize understanding.

## 2017-12-05 ENCOUNTER — HOSPITAL ENCOUNTER (OUTPATIENT)
Dept: GENERAL RADIOLOGY | Age: 62
Discharge: HOME | End: 2017-12-05
Attending: NURSE PRACTITIONER
Payer: COMMERCIAL

## 2017-12-05 ENCOUNTER — OFFICE VISIT (OUTPATIENT)
Dept: FAMILY MEDICINE CLINIC | Facility: CLINIC | Age: 62
End: 2017-12-05

## 2017-12-05 VITALS
SYSTOLIC BLOOD PRESSURE: 130 MMHG | RESPIRATION RATE: 16 BRPM | HEART RATE: 64 BPM | DIASTOLIC BLOOD PRESSURE: 70 MMHG | WEIGHT: 271.63 LBS | TEMPERATURE: 98 F | BODY MASS INDEX: 39 KG/M2

## 2017-12-05 DIAGNOSIS — S69.92XA INJURY OF FINGER OF LEFT HAND, INITIAL ENCOUNTER: ICD-10-CM

## 2017-12-05 DIAGNOSIS — S62.663A CLOSED NONDISPLACED FRACTURE OF DISTAL PHALANX OF LEFT MIDDLE FINGER, INITIAL ENCOUNTER: Primary | ICD-10-CM

## 2017-12-05 PROCEDURE — 99213 OFFICE O/P EST LOW 20 MIN: CPT | Performed by: NURSE PRACTITIONER

## 2017-12-05 PROCEDURE — 73140 X-RAY EXAM OF FINGER(S): CPT | Performed by: NURSE PRACTITIONER

## 2017-12-05 NOTE — PATIENT INSTRUCTIONS
Referral to [facility] for finger fracture. Wear support. Let us know if need something for the pain. Follow-up as needed.

## 2017-12-05 NOTE — PROGRESS NOTES
HPI:    Patient ID: Yasmany Lorenzo is a 63year old male. HPI     Smashed fingers of left hand on a tailgate of a semi dump trailer this morning. Is having trouble getting the middle to stop bleeding. Also caught the 4th digit.      Review of Systems   C Amaryl  [Glimepirid*        Comment:Other reaction(s): Vomiting, diarrhea, nausea  Dulaglutide                 Comment:Other reaction(s): achiness  Pantoprazole            Rash  Contrast  [Radiolog*      Metrizamide             Itching  Penicillins

## 2017-12-26 ENCOUNTER — ANTI-COAG VISIT (OUTPATIENT)
Dept: FAMILY MEDICINE CLINIC | Facility: CLINIC | Age: 62
End: 2017-12-26

## 2017-12-26 DIAGNOSIS — I48.91 ATRIAL FIBRILLATION, UNSPECIFIED TYPE (HCC): ICD-10-CM

## 2017-12-26 DIAGNOSIS — Z79.01 LONG TERM (CURRENT) USE OF ANTICOAGULANTS: ICD-10-CM

## 2017-12-26 PROCEDURE — 85610 PROTHROMBIN TIME: CPT | Performed by: FAMILY MEDICINE

## 2017-12-26 NOTE — PROGRESS NOTES
INR checked in office in coumadin clinic  INR in goal at 3.0  Has been on antibiotic. Has two more days left. Encouraged to eat a vitamin K rich food serving today. CPM coumadin 7.5mg daily. Next INR in one month.

## 2017-12-27 ENCOUNTER — MED REC SCAN ONLY (OUTPATIENT)
Dept: FAMILY MEDICINE CLINIC | Facility: CLINIC | Age: 62
End: 2017-12-27

## 2017-12-27 RX ORDER — ALBIGLUTIDE 30 MG/.5ML
30 INJECTION, POWDER, LYOPHILIZED, FOR SOLUTION SUBCUTANEOUS WEEKLY
Qty: 13 EACH | Refills: 3 | Status: SHIPPED | OUTPATIENT
Start: 2017-12-27 | End: 2018-01-31

## 2017-12-27 NOTE — TELEPHONE ENCOUNTER
Future Appointments  Date Time Provider Anayeli Harrell   1/29/2018 8:30 AM [facility] COUMADIN NURSE [facility] [place] [facility] [place]   2/27/2018 8:15 AM REF [place] REF [facility]   3/7/2018 6:00 PM Sunday Sprague MD [facility] [place] [facility] [place]

## 2017-12-29 ENCOUNTER — TELEPHONE (OUTPATIENT)
Dept: FAMILY MEDICINE CLINIC | Facility: CLINIC | Age: 62
End: 2017-12-29

## 2017-12-29 NOTE — TELEPHONE ENCOUNTER
Patient informed Tanzeum Prior Authorization denied due to Hgb A1C went up. One of the criteria to be met is that Tanzeum is working. Patient states He has not been using His Tanzeum Weekly.   Informed Victoza will go through without a Prior Authorizati

## 2018-01-17 ENCOUNTER — TELEPHONE (OUTPATIENT)
Dept: FAMILY MEDICINE CLINIC | Facility: CLINIC | Age: 63
End: 2018-01-17

## 2018-01-18 NOTE — TELEPHONE ENCOUNTER
Patient calling stating he just got a new job and now no longer has Monday's off. I will be here in the office this Saturday. Will schedule him for INR fingerstick, otherwise if blood draw, INR results will not be back until Monday.    Appointment schedul

## 2018-01-20 ENCOUNTER — ANTI-COAG VISIT (OUTPATIENT)
Dept: FAMILY MEDICINE CLINIC | Facility: CLINIC | Age: 63
End: 2018-01-20

## 2018-01-20 DIAGNOSIS — Z79.01 LONG TERM (CURRENT) USE OF ANTICOAGULANTS: ICD-10-CM

## 2018-01-20 DIAGNOSIS — I48.91 ATRIAL FIBRILLATION, UNSPECIFIED TYPE (HCC): ICD-10-CM

## 2018-01-20 LAB — INR: 2.9 (ref 0.8–1.2)

## 2018-01-20 PROCEDURE — 93793 ANTICOAG MGMT PT WARFARIN: CPT | Performed by: FAMILY MEDICINE

## 2018-01-20 PROCEDURE — 85610 PROTHROMBIN TIME: CPT | Performed by: FAMILY MEDICINE

## 2018-01-20 NOTE — PROGRESS NOTES
INR checked in office in coumadin clinic  INR in goal range at 2.9  CPM coumadin dose of 7.5mg daily   Next INR due in one month. Will  have drawn with fasting lab appointment.    Patient not able to come in the office for INR's during coumadin clinic hours

## 2018-01-20 NOTE — PATIENT INSTRUCTIONS
INR is in goal range. Continue same coumadin dosage. Increase leafy green vegetables and salads by 1-2 servings weekly.      Watch for bleeding problems such as black tarry stools, blood in urine, frequent or prolonged nose bleeds, unusual bruising, or an

## 2018-01-22 ENCOUNTER — TELEPHONE (OUTPATIENT)
Dept: FAMILY MEDICINE CLINIC | Facility: CLINIC | Age: 63
End: 2018-01-22

## 2018-01-22 NOTE — TELEPHONE ENCOUNTER
Patient has just started a new job and he is unable to come to office now for coumadin clinic visits. Asking to look into home INR monitoring? [employer] physician form completed and put in Dr. Haley Flannery in box for his signature.

## 2018-01-23 NOTE — TELEPHONE ENCOUNTER
[facility] prescription form for home monitoring signed by Dr. Joy Knight and faxed to [facility] .

## 2018-01-31 ENCOUNTER — TELEPHONE (OUTPATIENT)
Dept: FAMILY MEDICINE CLINIC | Facility: CLINIC | Age: 63
End: 2018-01-31

## 2018-01-31 NOTE — TELEPHONE ENCOUNTER
We stopped Trulicity in Feb 2438 because it caused achiness. We should try to continue the Tanzeum. If the insurance will not cover it, we need an alternative from them different than Trulicity.

## 2018-01-31 NOTE — TELEPHONE ENCOUNTER
Insurance does not cover Tanzeum. Insurance does cover Trulicity. Please advise Change.   Benjamín Hunter, 01/31/18, 5:42 PM

## 2018-02-01 ENCOUNTER — TELEPHONE (OUTPATIENT)
Dept: FAMILY MEDICINE CLINIC | Facility: CLINIC | Age: 63
End: 2018-02-01

## 2018-02-01 NOTE — TELEPHONE ENCOUNTER
Patient informed of below. Will await Prior Authorization information to come from Insurance.   Winston Waldron, 01/31/18, 6:30 PM

## 2018-02-02 ENCOUNTER — TELEPHONE (OUTPATIENT)
Dept: FAMILY MEDICINE CLINIC | Facility: CLINIC | Age: 63
End: 2018-02-02

## 2018-02-02 NOTE — TELEPHONE ENCOUNTER
----- Message from Ama Haque sent at 2/2/2018 10:08 AM CST -----  Contact: wife-Mari Rios  Returned your call and can be reached back at [phone].  Thank you

## 2018-02-02 NOTE — TELEPHONE ENCOUNTER
Mrs Bambi Marmolejo informed PriorAuthorization in processes for Tanzeum/agreed.   Moises Keys, 02/02/18, 10:58 AM

## 2018-02-02 NOTE — TELEPHONE ENCOUNTER
Insurance will not cover Tanzeum. Plan: He had been able to take Victoza in the past.  We will switch back to Victoza 1.8 mg subcutaneous daily. Call with any problems or concerns.

## 2018-02-19 ENCOUNTER — TELEPHONE (OUTPATIENT)
Dept: FAMILY MEDICINE CLINIC | Facility: CLINIC | Age: 63
End: 2018-02-19

## 2018-02-19 NOTE — TELEPHONE ENCOUNTER
Patient states that he thinks he is having a reaction to the Victoza. Patient states that he was switched to the Victoza 3 weeks ago and since then he has felt awful.  Patient states he feels like he has had flu like symptoms, and has been belching a lo

## 2018-02-19 NOTE — TELEPHONE ENCOUNTER
Patient is unable to come in until after 5. Dr Malika Gilliam has no openings for Wednesday night. Dr Malika Gilliam can you please advise on the following below. THank you.

## 2018-02-20 NOTE — TELEPHONE ENCOUNTER
Stop taking the Victoza. We will document that this was an adverse reaction to it. Not sure that there is another option at this time. We will discuss it at next appointment.

## 2018-02-20 NOTE — TELEPHONE ENCOUNTER
Patient informed of below. Expressed understanding. Patient states He is going to try a smaller dose this week to see if improvement.   French Lee, 02/20/18, 4:02 PM

## 2018-02-24 ENCOUNTER — ANTI-COAG VISIT (OUTPATIENT)
Dept: FAMILY MEDICINE CLINIC | Facility: CLINIC | Age: 63
End: 2018-02-24

## 2018-02-24 ENCOUNTER — APPOINTMENT (OUTPATIENT)
Dept: LAB | Age: 63
End: 2018-02-24
Attending: FAMILY MEDICINE
Payer: COMMERCIAL

## 2018-02-24 DIAGNOSIS — I48.91 ATRIAL FIBRILLATION, UNSPECIFIED TYPE (HCC): ICD-10-CM

## 2018-02-24 DIAGNOSIS — Z79.01 LONG TERM (CURRENT) USE OF ANTICOAGULANTS: ICD-10-CM

## 2018-02-24 DIAGNOSIS — E11.9 CONTROLLED TYPE 2 DIABETES MELLITUS WITHOUT COMPLICATION, WITHOUT LONG-TERM CURRENT USE OF INSULIN (HCC): ICD-10-CM

## 2018-02-24 LAB
ALBUMIN SERPL-MCNC: 3.6 G/DL (ref 3.5–4.8)
ALP LIVER SERPL-CCNC: 110 U/L (ref 45–117)
ALT SERPL-CCNC: 29 U/L (ref 17–63)
AST SERPL-CCNC: 18 U/L (ref 15–41)
BILIRUB SERPL-MCNC: 0.3 MG/DL (ref 0.1–2)
BUN BLD-MCNC: 21 MG/DL (ref 8–20)
CALCIUM BLD-MCNC: 8.6 MG/DL (ref 8.3–10.3)
CHLORIDE: 107 MMOL/L (ref 101–111)
CO2: 26 MMOL/L (ref 22–32)
CREAT BLD-MCNC: 0.85 MG/DL (ref 0.7–1.3)
EST. AVERAGE GLUCOSE BLD GHB EST-MCNC: 160 MG/DL (ref 68–126)
GLUCOSE BLD-MCNC: 144 MG/DL (ref 70–99)
HBA1C MFR BLD HPLC: 7.2 % (ref ?–5.7)
INR: 2 (ref 0.8–1.2)
M PROTEIN MFR SERPL ELPH: 7.1 G/DL (ref 6.1–8.3)
POTASSIUM SERPL-SCNC: 4 MMOL/L (ref 3.6–5.1)
SODIUM SERPL-SCNC: 141 MMOL/L (ref 136–144)

## 2018-02-24 PROCEDURE — 83036 HEMOGLOBIN GLYCOSYLATED A1C: CPT | Performed by: FAMILY MEDICINE

## 2018-02-24 PROCEDURE — 93793 ANTICOAG MGMT PT WARFARIN: CPT | Performed by: FAMILY MEDICINE

## 2018-02-24 PROCEDURE — 80053 COMPREHEN METABOLIC PANEL: CPT | Performed by: FAMILY MEDICINE

## 2018-02-24 PROCEDURE — 36415 COLL VENOUS BLD VENIPUNCTURE: CPT | Performed by: FAMILY MEDICINE

## 2018-02-24 NOTE — PROGRESS NOTES
Here for INR check in coumadin clinic in the office. COMPLAINTS/CHANGES:  None    INR RESULT TODAY:  2.0 ( in goal range)     PLAN:   CPM coumadin 7.5mg daily  Next INR due in one month.      While attempting to schedule INR appointment, patient states A

## 2018-03-07 ENCOUNTER — OFFICE VISIT (OUTPATIENT)
Dept: FAMILY MEDICINE CLINIC | Facility: CLINIC | Age: 63
End: 2018-03-07

## 2018-03-07 VITALS
BODY MASS INDEX: 38.86 KG/M2 | DIASTOLIC BLOOD PRESSURE: 72 MMHG | RESPIRATION RATE: 16 BRPM | TEMPERATURE: 97 F | SYSTOLIC BLOOD PRESSURE: 118 MMHG | WEIGHT: 268.38 LBS | HEART RATE: 68 BPM | HEIGHT: 69.5 IN

## 2018-03-07 DIAGNOSIS — E11.9 CONTROLLED TYPE 2 DIABETES MELLITUS WITHOUT COMPLICATION, WITHOUT LONG-TERM CURRENT USE OF INSULIN (HCC): Primary | ICD-10-CM

## 2018-03-07 DIAGNOSIS — Z79.01 LONG TERM (CURRENT) USE OF ANTICOAGULANTS: ICD-10-CM

## 2018-03-07 DIAGNOSIS — F32.A DEPRESSION, UNSPECIFIED DEPRESSION TYPE: ICD-10-CM

## 2018-03-07 DIAGNOSIS — I48.91 ATRIAL FIBRILLATION, UNSPECIFIED TYPE (HCC): ICD-10-CM

## 2018-03-07 DIAGNOSIS — I10 BENIGN ESSENTIAL HYPERTENSION: ICD-10-CM

## 2018-03-07 DIAGNOSIS — K21.00 GASTROESOPHAGEAL REFLUX DISEASE WITH ESOPHAGITIS: ICD-10-CM

## 2018-03-07 PROCEDURE — 99214 OFFICE O/P EST MOD 30 MIN: CPT | Performed by: FAMILY MEDICINE

## 2018-03-08 NOTE — PROGRESS NOTES
[address]  PROGRESS NOTE  Chief Complaint:   Patient presents with:  Diabetes: Taking Victoza 1.2mg daily. Follow - Up      HPI:   This is a 66year old male coming in for follow-up on his diabetes. He has been taking the Victoza 1. Smokeless tobacco: Never Used                      Alcohol use:  No              Family History:  Family History   Problem Relation Age of Onset   • Diabetes Father    • Diabetes Mother    • Heart Disorder Moth Disp:  Rfl:    Insulin Pen Needle (BD PEN NEEDLE SHORT U/F) 31G X 8 MM Does not apply Misc  Disp:  Rfl:    ONETOUCH LANCETS Does not apply Misc  Disp:  Rfl:    Warfarin Sodium (COUMADIN) 2.5 MG Oral Tab Take 2.5 mg by mouth.  One tablet Sun,Tues,Wed,Thurs,F oz.   Vital signs reviewed. Appears stated age, well groomed. Physical Exam:  GEN:  Patient is alert, awake and oriented, well developed, well nourished, no apparent distress.   HEENT:  Head:  Normocephalic, atraumatic Eyes: EOMI, PERRLA, no scleral icter is doing very well overall. No changes recommended in his medications today.       Meds & Refills for this Visit:  No prescriptions requested or ordered in this encounter      Patient/Caregiver Education: Patient/Caregiver Education: There are no barriers

## 2018-03-12 ENCOUNTER — ANTI-COAG VISIT (OUTPATIENT)
Dept: FAMILY MEDICINE CLINIC | Facility: CLINIC | Age: 63
End: 2018-03-12

## 2018-03-12 DIAGNOSIS — I48.91 ATRIAL FIBRILLATION, UNSPECIFIED TYPE (HCC): ICD-10-CM

## 2018-03-12 DIAGNOSIS — Z79.01 LONG TERM (CURRENT) USE OF ANTICOAGULANTS: ICD-10-CM

## 2018-03-12 LAB — INR: 3 (ref 0.8–1.2)

## 2018-03-12 PROCEDURE — 93793 ANTICOAG MGMT PT WARFARIN: CPT | Performed by: FAMILY MEDICINE

## 2018-03-12 NOTE — PROGRESS NOTES
INR checked by patient on home meter. CURRENT COUMADIN DOSAGE:   7.5mg daily     COMPLAINTS/CHANGES:  Less vegetables    INR RESULTS TODAY:   3.0 ( in goal, but at upper end of goal)     Message left for patient to return call.  Chyna Velasco, 03/12/18,

## 2018-03-26 ENCOUNTER — ANTI-COAG VISIT (OUTPATIENT)
Dept: FAMILY MEDICINE CLINIC | Facility: CLINIC | Age: 63
End: 2018-03-26

## 2018-03-26 DIAGNOSIS — Z79.01 LONG TERM (CURRENT) USE OF ANTICOAGULANTS: ICD-10-CM

## 2018-03-26 DIAGNOSIS — I48.91 ATRIAL FIBRILLATION, UNSPECIFIED TYPE (HCC): ICD-10-CM

## 2018-03-26 LAB — INR: 3.4 (ref 0.8–1.2)

## 2018-03-26 PROCEDURE — 93793 ANTICOAG MGMT PT WARFARIN: CPT | Performed by: FAMILY MEDICINE

## 2018-03-26 NOTE — PROGRESS NOTES
INR checked by patient on home meter. INR elevated at 3.4 yesterday  Message left for patient to return call with instructions to hold coumadin today,  if he took coumadin last evening.

## 2018-03-27 NOTE — PROGRESS NOTES
Message left again for patient to return call. Patient returned call 3 days after INR test. Patient checked INR on home meter Sunday. He did not listen to message until late on Monday and had already taken coumadin.   So he held coumadin dose on Tuesd

## 2018-03-30 ENCOUNTER — TELEPHONE (OUTPATIENT)
Dept: FAMILY MEDICINE CLINIC | Facility: CLINIC | Age: 63
End: 2018-03-30

## 2018-03-30 NOTE — TELEPHONE ENCOUNTER
Was in ER last evening for nasal bleeding. Was given neosporin nasal spray to use for 2 days. Bleeding has stopped. Patient states they checked INR and it was 1.7. Taking long term coumadin for A-fib  He states his Hgb was 13.6, Hct was 39.9.    Calling t

## 2018-03-31 RX ORDER — WARFARIN SODIUM 2.5 MG/1
TABLET ORAL
Qty: 90 TABLET | Refills: 0 | Status: SHIPPED | OUTPATIENT
Start: 2018-03-31 | End: 2018-07-02

## 2018-03-31 NOTE — TELEPHONE ENCOUNTER
Future appt:     Your appointments     Date & Time Appointment Department [facility])    Sep 08, 2018  8:00 AM CDT Laboratory Visit with REF [facility] ([facility] Ref [facility])    Sep 15, 2018  9:00 AM CDT Physical - Established Patient with T

## 2018-04-03 ENCOUNTER — ANTI-COAG VISIT (OUTPATIENT)
Dept: FAMILY MEDICINE CLINIC | Facility: CLINIC | Age: 63
End: 2018-04-03

## 2018-04-03 DIAGNOSIS — I48.91 ATRIAL FIBRILLATION, UNSPECIFIED TYPE (HCC): ICD-10-CM

## 2018-04-03 DIAGNOSIS — Z79.01 LONG TERM (CURRENT) USE OF ANTICOAGULANTS: ICD-10-CM

## 2018-04-03 PROCEDURE — 93793 ANTICOAG MGMT PT WARFARIN: CPT | Performed by: FAMILY MEDICINE

## 2018-04-03 NOTE — PROGRESS NOTES
INR checked by patient on home meter. Results received by fax from [facility] home monitoring. Message left for patient to return call. INR in goal range at 2.5. Take coumadin 5mg F and 7.5mg daily the rest of the week. No return call after 6 days.

## 2018-04-12 ENCOUNTER — ANTI-COAG VISIT (OUTPATIENT)
Dept: FAMILY MEDICINE CLINIC | Facility: CLINIC | Age: 63
End: 2018-04-12

## 2018-04-12 DIAGNOSIS — I48.91 ATRIAL FIBRILLATION, UNSPECIFIED TYPE (HCC): ICD-10-CM

## 2018-04-12 DIAGNOSIS — Z79.01 LONG TERM (CURRENT) USE OF ANTICOAGULANTS: ICD-10-CM

## 2018-04-12 PROCEDURE — 93793 ANTICOAG MGMT PT WARFARIN: CPT | Performed by: FAMILY MEDICINE

## 2018-04-12 NOTE — PROGRESS NOTES
INR checked by patient on home meter. INR rising again at 3.0. Attempted to contact patient. Message left for patient to return call on his home phone. Unable to get through on cell phone. Patient most likely is at work during the day.      Plan is t

## 2018-04-13 NOTE — PROGRESS NOTES
INR checked by patient on home meter.     CURRENT COUMADIN DOSAGE:   5mg last Friday and 7.5mg daily the rest of the week  ( already took 7.5mg this morning on Friday)     COMPLAINTS/CHANGES:  No changes    INR RESULTS:  3.0 ( in goal, but rising again)

## 2018-04-19 ENCOUNTER — ANTI-COAG VISIT (OUTPATIENT)
Dept: FAMILY MEDICINE CLINIC | Facility: CLINIC | Age: 63
End: 2018-04-19

## 2018-04-19 DIAGNOSIS — Z79.01 LONG TERM (CURRENT) USE OF ANTICOAGULANTS: ICD-10-CM

## 2018-04-19 DIAGNOSIS — I48.91 ATRIAL FIBRILLATION, UNSPECIFIED TYPE (HCC): ICD-10-CM

## 2018-04-19 PROCEDURE — 93793 ANTICOAG MGMT PT WARFARIN: CPT | Performed by: FAMILY MEDICINE

## 2018-04-19 PROCEDURE — 85610 PROTHROMBIN TIME: CPT | Performed by: FAMILY MEDICINE

## 2018-04-19 NOTE — PROGRESS NOTES
INR checked by patient on home meter.     CURRENT COUMADIN DOSAGE:   5mg Tu,Sat and 7.5mg daily the rest of the week    COMPLAINTS/CHANGES:  No changes    INR RESULTS TODAY:   2.7 ( in goal)    PLAN:   CPM coumadin  Next INR in 2 weeks on home meter    Kiara

## 2018-04-23 ENCOUNTER — TELEPHONE (OUTPATIENT)
Dept: FAMILY MEDICINE CLINIC | Facility: CLINIC | Age: 63
End: 2018-04-23

## 2018-04-23 NOTE — TELEPHONE ENCOUNTER
Patient states He is having chest pressure and shakiness in left arm. States no SOB/Neck/Jaw pain. Positive pain in shoulder blades. Patient feels like an Elephant is sitting on His chest.    Patient informed will need [facility] ER evaluation.   Does not have

## 2018-05-08 ENCOUNTER — ANTI-COAG VISIT (OUTPATIENT)
Dept: FAMILY MEDICINE CLINIC | Facility: CLINIC | Age: 63
End: 2018-05-08

## 2018-05-08 ENCOUNTER — TELEPHONE (OUTPATIENT)
Dept: FAMILY MEDICINE CLINIC | Facility: CLINIC | Age: 63
End: 2018-05-08

## 2018-05-08 DIAGNOSIS — I48.91 ATRIAL FIBRILLATION, UNSPECIFIED TYPE (HCC): ICD-10-CM

## 2018-05-08 DIAGNOSIS — Z79.01 LONG TERM (CURRENT) USE OF ANTICOAGULANTS: ICD-10-CM

## 2018-05-08 PROCEDURE — 93793 ANTICOAG MGMT PT WARFARIN: CPT | Performed by: FAMILY MEDICINE

## 2018-05-08 NOTE — TELEPHONE ENCOUNTER
Patient states He again having SOB/Chest Tightness/Dizziness. Same as when He was admitted for recently. Advised [facility] ER. Patient states His Supervisor will drive Him to ER for Evaluation.   Bruno Simpson, 05/08/18, 3:04 PM

## 2018-05-08 NOTE — PROGRESS NOTES
INR checked by patient on home meter. CURRENT COUMADIN DOSAGE:   5mg Tu, Sa and 7.5mg daily the rest of the week     COMPLAINTS/CHANGES:  Recent ER visit  Diltiazem increased.    No change in INR since admission    INR RESULTS TODAY:   2.0 ( in goal)

## 2018-05-09 ENCOUNTER — OFFICE VISIT (OUTPATIENT)
Dept: FAMILY MEDICINE CLINIC | Facility: CLINIC | Age: 63
End: 2018-05-09

## 2018-05-09 VITALS
HEIGHT: 71 IN | BODY MASS INDEX: 38.08 KG/M2 | RESPIRATION RATE: 18 BRPM | WEIGHT: 272 LBS | SYSTOLIC BLOOD PRESSURE: 116 MMHG | TEMPERATURE: 99 F | DIASTOLIC BLOOD PRESSURE: 64 MMHG | HEART RATE: 72 BPM

## 2018-05-09 DIAGNOSIS — E78.49 FAMILIAL MULTIPLE LIPOPROTEIN-TYPE HYPERLIPIDEMIA: ICD-10-CM

## 2018-05-09 DIAGNOSIS — I10 BENIGN ESSENTIAL HYPERTENSION: ICD-10-CM

## 2018-05-09 DIAGNOSIS — K21.00 GASTROESOPHAGEAL REFLUX DISEASE WITH ESOPHAGITIS: ICD-10-CM

## 2018-05-09 DIAGNOSIS — I48.0 PAROXYSMAL ATRIAL FIBRILLATION (HCC): Primary | ICD-10-CM

## 2018-05-09 DIAGNOSIS — E11.9 CONTROLLED TYPE 2 DIABETES MELLITUS WITHOUT COMPLICATION, WITHOUT LONG-TERM CURRENT USE OF INSULIN (HCC): ICD-10-CM

## 2018-05-09 PROCEDURE — 1111F DSCHRG MED/CURRENT MED MERGE: CPT | Performed by: FAMILY MEDICINE

## 2018-05-09 PROCEDURE — 99214 OFFICE O/P EST MOD 30 MIN: CPT | Performed by: FAMILY MEDICINE

## 2018-05-09 RX ORDER — DILTIAZEM HYDROCHLORIDE 180 MG/1
180 CAPSULE, COATED, EXTENDED RELEASE ORAL DAILY
Qty: 30 CAPSULE | Refills: 5 | Status: SHIPPED | OUTPATIENT
Start: 2018-05-09 | End: 2018-05-29

## 2018-05-09 RX ORDER — DILTIAZEM HYDROCHLORIDE 60 MG/1
60 TABLET, FILM COATED ORAL 2 TIMES DAILY
COMMUNITY
End: 2018-05-09

## 2018-05-09 NOTE — PROGRESS NOTES
[facility] [place]  PROGRESS NOTE  Chief Complaint:   Patient presents with:  Hospital F/U      HPI:   This is a 66year old male coming in for follow-up on his emergency room visit.   He has been in the emergency department 3 times in the last m Comment:IVP dye  Amaryl  [Glimepirid*        Comment:Other reaction(s): Vomiting, diarrhea, nausea  Dulaglutide                 Comment:Other reaction(s): achiness  Pantoprazole            RASH  Contrast  [Radiolog*      Metrizamide             ITCHING  Pe REVIEW OF SYSTEMS:   CONSTITUTIONAL: He said he feels fine today. When he goes into atrial fib he gets discomfort in his chest and breaks out in a sweat. EENT:  Eyes:  Denies eye pain, visual loss, blurred vision, double vision or yellow sclerae.  Ear clear bilaterally, no eye discharge Ears: External normal. Nose: patent, no nasal discharge Throat:  No tonsillar erythema or exudate. Mouth:  No oral lesions or ulcerations, good dentition. NECK: Supple, no CLAD, no JVD, no thyromegaly.   SKIN: No rashes to learning. Medical education done. Outcome: Patient verbalizes understanding. Patient is notified to call with any questions, complications, allergies, or worsening or changing symptoms.   Patient is to call with any side effects or complications from t

## 2018-05-21 ENCOUNTER — ANTI-COAG VISIT (OUTPATIENT)
Dept: FAMILY MEDICINE CLINIC | Facility: CLINIC | Age: 63
End: 2018-05-21

## 2018-05-21 DIAGNOSIS — Z79.01 LONG TERM (CURRENT) USE OF ANTICOAGULANTS: ICD-10-CM

## 2018-05-21 DIAGNOSIS — I48.91 ATRIAL FIBRILLATION, UNSPECIFIED TYPE (HCC): ICD-10-CM

## 2018-05-21 PROCEDURE — 93793 ANTICOAG MGMT PT WARFARIN: CPT | Performed by: FAMILY MEDICINE

## 2018-05-21 NOTE — PROGRESS NOTES
INR checked by patient on home meter. CURRENT COUMADIN DOSAGE:   5mg Tues and  Sat and 7.5mg daily     COMPLAINTS/CHANGES:  No changes    INR RESULTS TODAY:   2.3 ( in goal)    PLAN:   CPM coumadin   Next INR due in 2 weeks.      Patient notified of coum

## 2018-05-29 ENCOUNTER — TELEPHONE (OUTPATIENT)
Dept: FAMILY MEDICINE CLINIC | Facility: CLINIC | Age: 63
End: 2018-05-29

## 2018-05-29 RX ORDER — DILTIAZEM HYDROCHLORIDE 180 MG/1
180 CAPSULE, COATED, EXTENDED RELEASE ORAL DAILY
Qty: 90 CAPSULE | Refills: 1 | Status: SHIPPED | OUTPATIENT
Start: 2018-05-29 | End: 2018-09-19

## 2018-06-04 ENCOUNTER — ANTI-COAG VISIT (OUTPATIENT)
Dept: FAMILY MEDICINE CLINIC | Facility: CLINIC | Age: 63
End: 2018-06-04

## 2018-06-04 DIAGNOSIS — I48.91 ATRIAL FIBRILLATION, UNSPECIFIED TYPE (HCC): ICD-10-CM

## 2018-06-04 DIAGNOSIS — Z79.01 LONG TERM (CURRENT) USE OF ANTICOAGULANTS: ICD-10-CM

## 2018-06-04 PROCEDURE — 93793 ANTICOAG MGMT PT WARFARIN: CPT | Performed by: FAMILY MEDICINE

## 2018-06-04 NOTE — PROGRESS NOTES
INR checked by patient on home meter. CURRENT COUMADIN DOSAGE:   5mg M, F and 7.5mg daily the rest of the week     COMPLAINTS/CHANGES:  Stopped victoza 2 weeks ago  Increase diltiazem last week.     INR RESULTS TODAY:   2.6 ( in goal)     PLAN:   CPM cou

## 2018-06-11 ENCOUNTER — ANTI-COAG VISIT (OUTPATIENT)
Dept: FAMILY MEDICINE CLINIC | Facility: CLINIC | Age: 63
End: 2018-06-11

## 2018-06-11 DIAGNOSIS — Z79.01 LONG TERM (CURRENT) USE OF ANTICOAGULANTS: ICD-10-CM

## 2018-06-11 DIAGNOSIS — I48.91 ATRIAL FIBRILLATION, UNSPECIFIED TYPE (HCC): ICD-10-CM

## 2018-06-11 PROCEDURE — 93793 ANTICOAG MGMT PT WARFARIN: CPT | Performed by: FAMILY MEDICINE

## 2018-06-11 NOTE — PROGRESS NOTES
INR checked by patient on home meter.     CURRENT COUMADIN DOSAGE:   5mg M,F and 2.5mg daily the rest of the week     COMPLAINTS/CHANGES:  Diltazem increased a couple weeks ago  Victoza discontinued 3 weeks ago    INR RESULTS TODAY:   3.0 ( in goal, but cli

## 2018-06-18 ENCOUNTER — ANTI-COAG VISIT (OUTPATIENT)
Dept: FAMILY MEDICINE CLINIC | Facility: CLINIC | Age: 63
End: 2018-06-18

## 2018-06-18 DIAGNOSIS — I48.91 ATRIAL FIBRILLATION, UNSPECIFIED TYPE (HCC): ICD-10-CM

## 2018-06-18 DIAGNOSIS — Z79.01 LONG TERM (CURRENT) USE OF ANTICOAGULANTS: ICD-10-CM

## 2018-06-18 PROCEDURE — 93793 ANTICOAG MGMT PT WARFARIN: CPT | Performed by: FAMILY MEDICINE

## 2018-06-18 NOTE — PROGRESS NOTES
INR checked by patient on home meter.     CURRENT COUMADIN DOSAGE:   5mg M,W,F and 7.5mg daily the rest of the week     COMPLAINTS/CHANGES:  No changes    INR RESULTS TODAY:   2.4 ( in goal)    PLAN:   CPM coumadin 5mg M,W,F and 7.5mg daily the rest of the

## 2018-07-02 RX ORDER — WARFARIN SODIUM 2.5 MG/1
TABLET ORAL
Qty: 90 TABLET | Refills: 3 | Status: SHIPPED | OUTPATIENT
Start: 2018-07-02 | End: 2019-07-08

## 2018-07-02 NOTE — TELEPHONE ENCOUNTER
Future appt:     Your appointments     Date & Time Appointment Department [facility])    Sep 08, 2018  8:00 AM CDT Laboratory Visit with REF [facility] ([facility])    Sep 19, 2018  6:30 PM CDT Physical - Established Patient with T

## 2018-07-09 ENCOUNTER — TELEPHONE (OUTPATIENT)
Dept: FAMILY MEDICINE CLINIC | Facility: CLINIC | Age: 63
End: 2018-07-09

## 2018-07-09 NOTE — TELEPHONE ENCOUNTER
INR over due. Message left for patient to check INR on home meter as soon as possible. Did note that patient had been admitted for GI bleed, but appears to be discharged on coumadin therapy still.

## 2018-07-16 ENCOUNTER — ANTI-COAG VISIT (OUTPATIENT)
Dept: FAMILY MEDICINE CLINIC | Facility: CLINIC | Age: 63
End: 2018-07-16

## 2018-07-16 DIAGNOSIS — I48.91 ATRIAL FIBRILLATION, UNSPECIFIED TYPE (HCC): ICD-10-CM

## 2018-07-16 DIAGNOSIS — Z79.01 LONG TERM (CURRENT) USE OF ANTICOAGULANTS: ICD-10-CM

## 2018-07-16 LAB — INR: 2.5 (ref 0.8–1.2)

## 2018-07-16 PROCEDURE — 93793 ANTICOAG MGMT PT WARFARIN: CPT | Performed by: FAMILY MEDICINE

## 2018-07-17 NOTE — PROGRESS NOTES
INR checked by patient on home meter. INR results received by fax from [address] [place] [address] home monitoring    Patient with recent admission for GI bleed, but discharged on coumadin therapy. INR result in goal range. Message left for patient to return call.  Chyna Crawford

## 2018-08-01 RX ORDER — WARFARIN SODIUM 5 MG/1
TABLET ORAL
Qty: 105 TABLET | Refills: 2 | OUTPATIENT
Start: 2018-08-01

## 2018-08-01 NOTE — TELEPHONE ENCOUNTER
Year supply of warfarin sent by Dr. Nery Richards 7/2/18 to [facility] in [place]. Request denied with this notation as refill is requested too soon.

## 2018-08-04 NOTE — TELEPHONE ENCOUNTER
Future appt:     Your appointments     Date & Time Appointment Department [facility])    Sep 08, 2018  8:00 AM CDT Laboratory Visit with REF Anila Orosco [facility] ([facility] [place])    Sep 19, 2018  6:30 PM CDT Physical - Established Patient with T

## 2018-08-06 ENCOUNTER — ANTI-COAG VISIT (OUTPATIENT)
Dept: FAMILY MEDICINE CLINIC | Facility: CLINIC | Age: 63
End: 2018-08-06

## 2018-08-06 DIAGNOSIS — Z79.01 LONG TERM (CURRENT) USE OF ANTICOAGULANTS: ICD-10-CM

## 2018-08-06 DIAGNOSIS — I48.91 ATRIAL FIBRILLATION, UNSPECIFIED TYPE (HCC): ICD-10-CM

## 2018-08-06 LAB — INR: 1.9 (ref 0.8–1.2)

## 2018-08-06 PROCEDURE — 93793 ANTICOAG MGMT PT WARFARIN: CPT | Performed by: FAMILY MEDICINE

## 2018-08-06 RX ORDER — WARFARIN SODIUM 5 MG/1
TABLET ORAL
Qty: 135 TABLET | Refills: 1 | Status: SHIPPED | OUTPATIENT
Start: 2018-08-06 | End: 2019-03-19

## 2018-08-06 NOTE — TELEPHONE ENCOUNTER
Future appt:     Your appointments     Date & Time Appointment Department [facility])    Sep 08, 2018  8:00 AM CDT Laboratory Visit with REF Brinda Puente [facility] ([facility] [place])    Sep 19, 2018  6:30 PM CDT Physical - Established Patient with T

## 2018-08-06 NOTE — PROGRESS NOTES
INR checked by patient on home meter.      CURRENT COUMADIN DOSE:  5mg M,W,F and 7.5mg daily the rest of the week    CHANGES OR COMPLAINTS:  Missed dose    INR RESULTS TODAY:  1.9 ( slightly below goal)    PLAN:  CPM coumadin for now  Next INR in 2 weeks on

## 2018-08-08 ENCOUNTER — TELEPHONE (OUTPATIENT)
Dept: FAMILY MEDICINE CLINIC | Facility: CLINIC | Age: 63
End: 2018-08-08

## 2018-08-08 NOTE — TELEPHONE ENCOUNTER
----- Message from Poli Milan sent at 8/8/2018  6:10 PM CDT -----  [facility]      [phone]             Poli Milan, 08/08/18, 6:11 PM

## 2018-08-08 NOTE — TELEPHONE ENCOUNTER
Patient states He has not heard from Pharmacy regarding Warfarin 5mg. Informed Rx sent on 8/6. He will call [facility].   Franci Degroot, 08/08/18, 6:19 PM

## 2018-08-19 LAB — INR: 1.9 (ref 2–3)

## 2018-08-20 ENCOUNTER — TELEPHONE (OUTPATIENT)
Dept: FAMILY MEDICINE CLINIC | Facility: CLINIC | Age: 63
End: 2018-08-20

## 2018-08-20 ENCOUNTER — ANTI-COAG VISIT (OUTPATIENT)
Dept: FAMILY MEDICINE CLINIC | Facility: CLINIC | Age: 63
End: 2018-08-20

## 2018-08-20 DIAGNOSIS — I48.91 ATRIAL FIBRILLATION, UNSPECIFIED TYPE (HCC): ICD-10-CM

## 2018-08-20 DIAGNOSIS — Z79.01 LONG TERM (CURRENT) USE OF ANTICOAGULANTS: ICD-10-CM

## 2018-08-20 PROCEDURE — 93793 ANTICOAG MGMT PT WARFARIN: CPT | Performed by: FAMILY MEDICINE

## 2018-08-20 NOTE — PROGRESS NOTES
Coumadin dose showing is correct. Please advise Coumadin prior to Next Friday 8/31 Dental Procedure. Rosaline Letters, 08/20/18, 10:25 AM    CRISPIN/JESSICA Faustin, 08/20/18, 1:14 PM    Patient is having Dental Implants next Friday.   Asking what He should do regar

## 2018-08-21 ENCOUNTER — TELEPHONE (OUTPATIENT)
Dept: FAMILY MEDICINE CLINIC | Facility: CLINIC | Age: 63
End: 2018-08-21

## 2018-08-22 NOTE — TELEPHONE ENCOUNTER
Was notified by Lizbeth Alcaraz that patient is having upcoming dental implants. Message left with patient to return call regarding coumadin management for procedure.  Red Olivarez, 08/22/18, 10:33 AM

## 2018-09-04 ENCOUNTER — TELEPHONE (OUTPATIENT)
Dept: FAMILY MEDICINE CLINIC | Facility: CLINIC | Age: 63
End: 2018-09-04

## 2018-09-04 DIAGNOSIS — E11.9 CONTROLLED TYPE 2 DIABETES MELLITUS WITHOUT COMPLICATION, WITHOUT LONG-TERM CURRENT USE OF INSULIN (HCC): ICD-10-CM

## 2018-09-04 DIAGNOSIS — I10 BENIGN ESSENTIAL HYPERTENSION: ICD-10-CM

## 2018-09-04 DIAGNOSIS — E78.49 FAMILIAL MULTIPLE LIPOPROTEIN-TYPE HYPERLIPIDEMIA: ICD-10-CM

## 2018-09-04 LAB — INR: 2.7 (ref 0.8–1.2)

## 2018-09-04 NOTE — TELEPHONE ENCOUNTER
----- Message from Bren Carreon sent at 9/4/2018  8:49 AM CDT -----  Regarding: lab orders needed   Patient has lab appointment on 9/8/18 could you please put lab orders in system.         Thanks,  Roula

## 2018-09-08 ENCOUNTER — LABORATORY ENCOUNTER (OUTPATIENT)
Dept: LAB | Age: 63
End: 2018-09-08
Attending: FAMILY MEDICINE
Payer: COMMERCIAL

## 2018-09-08 DIAGNOSIS — I10 BENIGN ESSENTIAL HYPERTENSION: ICD-10-CM

## 2018-09-08 DIAGNOSIS — E78.49 FAMILIAL MULTIPLE LIPOPROTEIN-TYPE HYPERLIPIDEMIA: ICD-10-CM

## 2018-09-08 DIAGNOSIS — E11.9 CONTROLLED TYPE 2 DIABETES MELLITUS WITHOUT COMPLICATION, WITHOUT LONG-TERM CURRENT USE OF INSULIN (HCC): ICD-10-CM

## 2018-09-08 LAB
ALBUMIN SERPL-MCNC: 3.5 G/DL (ref 3.5–4.8)
ALBUMIN/GLOB SERPL: 1 {RATIO} (ref 1–2)
ALP LIVER SERPL-CCNC: 92 U/L (ref 45–117)
ALT SERPL-CCNC: 42 U/L (ref 17–63)
ANION GAP SERPL CALC-SCNC: 7 MMOL/L (ref 0–18)
AST SERPL-CCNC: 24 U/L (ref 15–41)
BASOPHILS # BLD AUTO: 0.04 X10(3) UL (ref 0–0.1)
BASOPHILS NFR BLD AUTO: 0.6 %
BILIRUB SERPL-MCNC: 0.4 MG/DL (ref 0.1–2)
BUN BLD-MCNC: 17 MG/DL (ref 8–20)
BUN/CREAT SERPL: 16.8 (ref 10–20)
CALCIUM BLD-MCNC: 8.2 MG/DL (ref 8.3–10.3)
CHLORIDE SERPL-SCNC: 106 MMOL/L (ref 101–111)
CHOLEST SMN-MCNC: 152 MG/DL (ref ?–200)
CO2 SERPL-SCNC: 25 MMOL/L (ref 22–32)
CREAT BLD-MCNC: 1.01 MG/DL (ref 0.7–1.3)
CREAT UR-SCNC: 166 MG/DL
EOSINOPHIL # BLD AUTO: 0.13 X10(3) UL (ref 0–0.3)
EOSINOPHIL NFR BLD AUTO: 1.9 %
ERYTHROCYTE [DISTWIDTH] IN BLOOD BY AUTOMATED COUNT: 15.2 % (ref 11.5–16)
EST. AVERAGE GLUCOSE BLD GHB EST-MCNC: 214 MG/DL (ref 68–126)
GLOBULIN PLAS-MCNC: 3.4 G/DL (ref 2.5–4)
GLUCOSE BLD-MCNC: 207 MG/DL (ref 70–99)
HBA1C MFR BLD HPLC: 9.1 % (ref ?–5.7)
HCT VFR BLD AUTO: 48 % (ref 37–53)
HDLC SERPL-MCNC: 35 MG/DL (ref 40–59)
HGB BLD-MCNC: 14.9 G/DL (ref 13–17)
IMMATURE GRANULOCYTE COUNT: 0.04 X10(3) UL (ref 0–1)
IMMATURE GRANULOCYTE RATIO %: 0.6 %
LDLC SERPL CALC-MCNC: 38 MG/DL (ref ?–100)
LYMPHOCYTES # BLD AUTO: 1.48 X10(3) UL (ref 0.9–4)
LYMPHOCYTES NFR BLD AUTO: 21.9 %
M PROTEIN MFR SERPL ELPH: 6.9 G/DL (ref 6.1–8.3)
MCH RBC QN AUTO: 25.3 PG (ref 27–33.2)
MCHC RBC AUTO-ENTMCNC: 31 G/DL (ref 31–37)
MCV RBC AUTO: 81.6 FL (ref 80–99)
MICROALBUMIN UR-MCNC: 1.06 MG/DL
MICROALBUMIN/CREAT 24H UR-RTO: 6.4 UG/MG (ref ?–30)
MONOCYTES # BLD AUTO: 0.55 X10(3) UL (ref 0.1–1)
MONOCYTES NFR BLD AUTO: 8.1 %
NEUTROPHIL ABS PRELIM: 4.51 X10 (3) UL (ref 1.3–6.7)
NEUTROPHILS # BLD AUTO: 4.51 X10(3) UL (ref 1.3–6.7)
NEUTROPHILS NFR BLD AUTO: 66.9 %
NONHDLC SERPL-MCNC: 117 MG/DL (ref ?–130)
OSMOLALITY SERPL CALC.SUM OF ELEC: 294 MOSM/KG (ref 275–295)
PLATELET # BLD AUTO: 194 10(3)UL (ref 150–450)
POTASSIUM SERPL-SCNC: 3.8 MMOL/L (ref 3.6–5.1)
RBC # BLD AUTO: 5.88 X10(6)UL (ref 4.3–5.7)
RED CELL DISTRIBUTION WIDTH-SD: 45.7 FL (ref 35.1–46.3)
SODIUM SERPL-SCNC: 138 MMOL/L (ref 136–144)
TRIGL SERPL-MCNC: 396 MG/DL (ref 30–149)
TSI SER-ACNC: 2.29 MIU/ML (ref 0.35–5.5)
URATE SERPL-MCNC: 4.9 MG/DL (ref 2.4–8.7)
VLDLC SERPL CALC-MCNC: 79 MG/DL (ref 0–30)
WBC # BLD AUTO: 6.8 X10(3) UL (ref 4–13)

## 2018-09-08 PROCEDURE — 83036 HEMOGLOBIN GLYCOSYLATED A1C: CPT | Performed by: FAMILY MEDICINE

## 2018-09-08 PROCEDURE — 84550 ASSAY OF BLOOD/URIC ACID: CPT | Performed by: FAMILY MEDICINE

## 2018-09-08 PROCEDURE — 80050 GENERAL HEALTH PANEL: CPT | Performed by: FAMILY MEDICINE

## 2018-09-08 PROCEDURE — 80061 LIPID PANEL: CPT | Performed by: FAMILY MEDICINE

## 2018-09-08 PROCEDURE — 82043 UR ALBUMIN QUANTITATIVE: CPT | Performed by: FAMILY MEDICINE

## 2018-09-08 PROCEDURE — 82570 ASSAY OF URINE CREATININE: CPT | Performed by: FAMILY MEDICINE

## 2018-09-11 ENCOUNTER — ANTI-COAG VISIT (OUTPATIENT)
Dept: FAMILY MEDICINE CLINIC | Facility: CLINIC | Age: 63
End: 2018-09-11

## 2018-09-11 DIAGNOSIS — I48.91 ATRIAL FIBRILLATION, UNSPECIFIED TYPE (HCC): ICD-10-CM

## 2018-09-11 DIAGNOSIS — Z79.01 LONG TERM (CURRENT) USE OF ANTICOAGULANTS: ICD-10-CM

## 2018-09-11 PROCEDURE — 93793 ANTICOAG MGMT PT WARFARIN: CPT | Performed by: FAMILY MEDICINE
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
## 2018-09-19 ENCOUNTER — OFFICE VISIT (OUTPATIENT)
Dept: FAMILY MEDICINE CLINIC | Facility: CLINIC | Age: 63
End: 2018-09-19
Payer: COMMERCIAL

## 2018-09-19 VITALS
TEMPERATURE: 99 F | BODY MASS INDEX: 36.56 KG/M2 | WEIGHT: 261.19 LBS | SYSTOLIC BLOOD PRESSURE: 104 MMHG | RESPIRATION RATE: 16 BRPM | DIASTOLIC BLOOD PRESSURE: 64 MMHG | HEART RATE: 60 BPM | HEIGHT: 71 IN

## 2018-09-19 DIAGNOSIS — K92.0 GASTROINTESTINAL HEMORRHAGE WITH HEMATEMESIS: ICD-10-CM

## 2018-09-19 DIAGNOSIS — I10 BENIGN ESSENTIAL HYPERTENSION: ICD-10-CM

## 2018-09-19 DIAGNOSIS — Z91.89 RISK FACTORS FOR OBSTRUCTIVE SLEEP APNEA: ICD-10-CM

## 2018-09-19 DIAGNOSIS — I48.91 ATRIAL FIBRILLATION, UNSPECIFIED TYPE (HCC): ICD-10-CM

## 2018-09-19 DIAGNOSIS — E78.49 FAMILIAL MULTIPLE LIPOPROTEIN-TYPE HYPERLIPIDEMIA: ICD-10-CM

## 2018-09-19 DIAGNOSIS — E11.9 CONTROLLED TYPE 2 DIABETES MELLITUS WITHOUT COMPLICATION, WITHOUT LONG-TERM CURRENT USE OF INSULIN (HCC): Primary | ICD-10-CM

## 2018-09-19 DIAGNOSIS — F32.A DEPRESSION, UNSPECIFIED DEPRESSION TYPE: ICD-10-CM

## 2018-09-19 DIAGNOSIS — K21.00 GASTROESOPHAGEAL REFLUX DISEASE WITH ESOPHAGITIS: ICD-10-CM

## 2018-09-19 PROBLEM — K92.2 GI BLEED: Status: ACTIVE | Noted: 2018-06-28

## 2018-09-19 PROCEDURE — 90471 IMMUNIZATION ADMIN: CPT | Performed by: FAMILY MEDICINE

## 2018-09-19 PROCEDURE — 99214 OFFICE O/P EST MOD 30 MIN: CPT | Performed by: FAMILY MEDICINE

## 2018-09-19 PROCEDURE — 1111F DSCHRG MED/CURRENT MED MERGE: CPT | Performed by: FAMILY MEDICINE

## 2018-09-19 PROCEDURE — 90686 IIV4 VACC NO PRSV 0.5 ML IM: CPT | Performed by: FAMILY MEDICINE

## 2018-09-19 RX ORDER — ROSUVASTATIN CALCIUM 10 MG/1
10 TABLET, COATED ORAL NIGHTLY
Qty: 90 TABLET | Refills: 3 | Status: SHIPPED | OUTPATIENT
Start: 2018-09-19 | End: 2019-09-18

## 2018-09-19 RX ORDER — OMEPRAZOLE 20 MG/1
20 CAPSULE, DELAYED RELEASE ORAL
Qty: 90 CAPSULE | Refills: 3 | Status: SHIPPED | OUTPATIENT
Start: 2018-09-19 | End: 2019-12-11

## 2018-09-19 RX ORDER — CITALOPRAM 20 MG/1
20 TABLET ORAL
Qty: 90 TABLET | Refills: 3 | Status: SHIPPED | OUTPATIENT
Start: 2018-09-19 | End: 2019-05-15

## 2018-09-19 RX ORDER — LISINOPRIL 10 MG/1
10 TABLET ORAL
Qty: 90 TABLET | Refills: 3 | Status: SHIPPED | OUTPATIENT
Start: 2018-09-19 | End: 2019-09-18

## 2018-09-19 RX ORDER — DILTIAZEM HYDROCHLORIDE 180 MG/1
180 CAPSULE, COATED, EXTENDED RELEASE ORAL DAILY
Qty: 90 CAPSULE | Refills: 1 | Status: SHIPPED | OUTPATIENT
Start: 2018-09-19 | End: 2019-09-18

## 2018-09-19 NOTE — PROGRESS NOTES
[facility] [place]  PROGRESS NOTE  Chief Complaint:   Patient presents with:  Physical  [facility]  Diabetes: Stopped Victoza/ Unsure of Methocaramol      HPI:   This is a 66year old male coming in for follo COLONOSCOPY  2010: HAND ORTHOSIS, METACARPAL FRACTURE ORTHOSIS, PREFABRICATED,   INCLUDES FITTING  02/28/2007: KNEE SCOPE,MED/LAT MENISECTOMY  No date: REPAIR ROTATOR CUFF,CHRONIC; Right  No date: REPAIR ROTATOR CUFF,CHRONIC; Left  Social History:  Social Propionate (FLONASE ALLERGY RELIEF) 50 MCG/ACT Nasal Suspension 50 mcg by Nasal route.  2 squirts each nostril daily prn  Disp:  Rfl:    Glucose Blood (ONETOUCH ULTRA BLUE) In Vitro Strip  Disp:  Rfl:    Insulin Pen Needle (BD PEN NEEDLE SHORT U/F) 31G X 8 (Tympanic)   Resp 16   Ht 71\"   Wt 261 lb 3.2 oz   BMI 36.43 kg/m²  Estimated body mass index is 36.43 kg/m² as calculated from the following:    Height as of this encounter: 71\". Weight as of this encounter: 261 lb 3.2 oz. Vital signs reviewed.   Ap too expensive we will consider Jardiance in its place. If the insurance will not cover that we may consider going back to Tanzeum which was working well but the insurance would not cover.     2. Benign essential hypertension  His blood pressure has gone up Propanediol (FARXIGA) 10 MG Oral Tab 30 tablet 5     Sig: Take 10 mg by mouth daily. Patient/Caregiver Education: Patient/Caregiver Education: There are no barriers to learning. Medical education done. Outcome: Patient verbalizes understanding.

## 2018-10-08 ENCOUNTER — ANTI-COAG VISIT (OUTPATIENT)
Dept: FAMILY MEDICINE CLINIC | Facility: CLINIC | Age: 63
End: 2018-10-08

## 2018-10-08 DIAGNOSIS — I48.91 ATRIAL FIBRILLATION, UNSPECIFIED TYPE (HCC): ICD-10-CM

## 2018-10-08 DIAGNOSIS — Z79.01 LONG TERM (CURRENT) USE OF ANTICOAGULANTS: ICD-10-CM

## 2018-10-08 PROCEDURE — 93793 ANTICOAG MGMT PT WARFARIN: CPT | Performed by: FAMILY MEDICINE

## 2018-10-08 NOTE — PROGRESS NOTES
INR checked by patient on home meter.     CURRENT COUMADIN DOSE:  5mg M,W,F and 7.5mg daily the rest of the week     CHANGES OR COMPLAINTS:  No changes    INR RESULTS TODAY:  1.6 ( below goal)    PLAN:  Increase coumadin dose today to 7.5mg  Tomorrow resume

## 2018-10-12 RX ORDER — ROSUVASTATIN CALCIUM 10 MG/1
10 TABLET, COATED ORAL NIGHTLY
Qty: 90 TABLET | Refills: 3 | Status: SHIPPED | OUTPATIENT
Start: 2018-10-12 | End: 2019-03-16

## 2018-10-12 RX ORDER — CITALOPRAM 20 MG/1
20 TABLET ORAL
Qty: 90 TABLET | Refills: 3 | Status: SHIPPED | OUTPATIENT
Start: 2018-10-12 | End: 2019-03-16

## 2018-10-12 RX ORDER — OMEPRAZOLE 20 MG/1
20 CAPSULE, DELAYED RELEASE ORAL
Qty: 90 CAPSULE | Refills: 3 | Status: SHIPPED | OUTPATIENT
Start: 2018-10-12 | End: 2019-03-16

## 2018-10-12 NOTE — TELEPHONE ENCOUNTER
Future appt:    Last Appointment:  9/19/2018  Cholesterol, Total (mg/dL)   Date Value   09/08/2018 152     HDL Cholesterol (mg/dL)   Date Value   09/08/2018 35 (L)     LDL Cholesterol (mg/dL)   Date Value   09/08/2018 38     Triglycerides (mg/dL)   Date Va

## 2018-10-22 ENCOUNTER — TELEPHONE (OUTPATIENT)
Dept: FAMILY MEDICINE CLINIC | Facility: CLINIC | Age: 63
End: 2018-10-22

## 2018-10-22 ENCOUNTER — ANTI-COAG VISIT (OUTPATIENT)
Dept: FAMILY MEDICINE CLINIC | Facility: CLINIC | Age: 63
End: 2018-10-22

## 2018-10-22 DIAGNOSIS — I48.91 ATRIAL FIBRILLATION, UNSPECIFIED TYPE (HCC): ICD-10-CM

## 2018-10-22 DIAGNOSIS — Z79.01 LONG TERM (CURRENT) USE OF ANTICOAGULANTS: ICD-10-CM

## 2018-10-22 PROCEDURE — 93793 ANTICOAG MGMT PT WARFARIN: CPT | Performed by: FAMILY MEDICINE

## 2018-10-22 NOTE — PROGRESS NOTES
Here for INR check in coumadin clinic in the office. CURRENT COUMADIN DOSE:  5mg M,W,F and 7.5mg daily the rest of the week.        INR RESULTS TODAY:  1.7 ( below goal)     PLAN:  Increase coumadin dose to 5mg Wed, Fri and 7.5mg daily the rest of the we

## 2018-11-02 ENCOUNTER — TELEPHONE (OUTPATIENT)
Dept: FAMILY MEDICINE CLINIC | Facility: CLINIC | Age: 63
End: 2018-11-02

## 2018-11-02 DIAGNOSIS — Z79.01 ENCOUNTER FOR CURRENT LONG-TERM USE OF ANTICOAGULANTS: Primary | ICD-10-CM

## 2018-11-05 NOTE — TELEPHONE ENCOUNTER
Insurance issue with [employer] home monitoring. Also recall on [employer] test strips. Patient advised he needs to have venous INRs until further notice due to recall.   Patient states he is working 7am-10pm.      Out patient lab at [facility] hours are 6am-

## 2018-11-20 ENCOUNTER — TELEPHONE (OUTPATIENT)
Dept: FAMILY MEDICINE CLINIC | Facility: CLINIC | Age: 63
End: 2018-11-20

## 2018-11-20 ENCOUNTER — ANTI-COAG VISIT (OUTPATIENT)
Dept: FAMILY MEDICINE CLINIC | Facility: CLINIC | Age: 63
End: 2018-11-20

## 2018-11-20 DIAGNOSIS — Z79.01 LONG TERM (CURRENT) USE OF ANTICOAGULANTS: ICD-10-CM

## 2018-11-20 DIAGNOSIS — I48.91 ATRIAL FIBRILLATION, UNSPECIFIED TYPE (HCC): ICD-10-CM

## 2018-11-20 NOTE — TELEPHONE ENCOUNTER
Ava calling to state Asha Grimm is no longer covered by Her Insurance. Patient will need to find another alternative for INR's.   Jaclyn Villagran, 11/20/18, 2:33 PM

## 2018-11-20 NOTE — PROGRESS NOTES
Coumadin Dose Showing is Correct. Please advise. None        Patient informed of Dr. Sandip Sarah instructions below.

## 2018-12-20 ENCOUNTER — TELEPHONE (OUTPATIENT)
Dept: FAMILY MEDICINE CLINIC | Facility: CLINIC | Age: 63
End: 2018-12-20

## 2018-12-20 NOTE — TELEPHONE ENCOUNTER
INR overdue. Message left for patient to get INR drawn. Normally gets INR drawn at [facility]. Has standing order.

## 2018-12-27 ENCOUNTER — TELEPHONE (OUTPATIENT)
Dept: FAMILY MEDICINE CLINIC | Facility: CLINIC | Age: 63
End: 2018-12-27

## 2019-01-10 ENCOUNTER — TELEPHONE (OUTPATIENT)
Dept: FAMILY MEDICINE CLINIC | Facility: CLINIC | Age: 64
End: 2019-01-10

## 2019-01-18 ENCOUNTER — ANTI-COAG VISIT (OUTPATIENT)
Dept: FAMILY MEDICINE CLINIC | Facility: CLINIC | Age: 64
End: 2019-01-18
Payer: COMMERCIAL

## 2019-01-18 DIAGNOSIS — Z79.01 LONG TERM (CURRENT) USE OF ANTICOAGULANTS: ICD-10-CM

## 2019-01-18 DIAGNOSIS — I48.91 ATRIAL FIBRILLATION, UNSPECIFIED TYPE (HCC): ICD-10-CM

## 2019-01-18 LAB — INR: 2 (ref 0.8–1.2)

## 2019-01-18 PROCEDURE — 93793 ANTICOAG MGMT PT WARFARIN: CPT | Performed by: FAMILY MEDICINE

## 2019-01-18 PROCEDURE — 85610 PROTHROMBIN TIME: CPT | Performed by: FAMILY MEDICINE

## 2019-01-18 NOTE — PROGRESS NOTES
Here for INR check in coumadin clinic in the office.     CURRENT COUMADIN DOSE:  5mg M,W,F and 7.5mg daily the rest of the week     CHANGES OR COMPLAINTS:  No changes    INR RESULTS TODAY:  2.0 ( in goal)    PLAN:  CPM coumadin   Next INR due in one month

## 2019-02-22 ENCOUNTER — TELEPHONE (OUTPATIENT)
Dept: FAMILY MEDICINE CLINIC | Facility: CLINIC | Age: 64
End: 2019-02-22
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
## 2019-02-27 ENCOUNTER — ANTI-COAG VISIT (OUTPATIENT)
Dept: FAMILY MEDICINE CLINIC | Facility: CLINIC | Age: 64
End: 2019-02-27
Payer: COMMERCIAL

## 2019-02-27 DIAGNOSIS — Z79.01 LONG TERM (CURRENT) USE OF ANTICOAGULANTS: ICD-10-CM

## 2019-02-27 DIAGNOSIS — I48.91 ATRIAL FIBRILLATION, UNSPECIFIED TYPE (HCC): ICD-10-CM

## 2019-02-27 LAB — INR: 1.2 (ref 0.8–1.2)

## 2019-02-27 PROCEDURE — 93793 ANTICOAG MGMT PT WARFARIN: CPT | Performed by: FAMILY MEDICINE

## 2019-02-27 PROCEDURE — 85610 PROTHROMBIN TIME: CPT | Performed by: FAMILY MEDICINE
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
## 2019-03-04 ENCOUNTER — ANTI-COAG VISIT (OUTPATIENT)
Dept: FAMILY MEDICINE CLINIC | Facility: CLINIC | Age: 64
End: 2019-03-04
Payer: COMMERCIAL

## 2019-03-04 DIAGNOSIS — I48.91 ATRIAL FIBRILLATION, UNSPECIFIED TYPE (HCC): ICD-10-CM

## 2019-03-04 DIAGNOSIS — Z79.01 LONG TERM (CURRENT) USE OF ANTICOAGULANTS: ICD-10-CM

## 2019-03-04 LAB — INR: 1.8 (ref 0.8–1.2)

## 2019-03-04 PROCEDURE — 93793 ANTICOAG MGMT PT WARFARIN: CPT | Performed by: FAMILY MEDICINE

## 2019-03-04 PROCEDURE — 85610 PROTHROMBIN TIME: CPT | Performed by: FAMILY MEDICINE

## 2019-03-04 NOTE — PROGRESS NOTES
Here for INR check in coumadin clinic in the office.     CURRENT COUMADIN DOSE:  7.5mg daily     CHANGES OR COMPLAINTS:  No changes    INR RESULTS TODAY:  1.8 ( rising toward goal)     PLAN:  Today coumadin 7.5mg  Tomorrow resume coumadin 5mg M,W,F and 7.5m

## 2019-03-12 ENCOUNTER — TELEPHONE (OUTPATIENT)
Dept: FAMILY MEDICINE CLINIC | Facility: CLINIC | Age: 64
End: 2019-03-12

## 2019-03-13 ENCOUNTER — TELEPHONE (OUTPATIENT)
Dept: FAMILY MEDICINE CLINIC | Facility: CLINIC | Age: 64
End: 2019-03-13

## 2019-03-13 NOTE — TELEPHONE ENCOUNTER
I spoke with Vivian Yoon wife Kim Ballesteros. She is very concerned about some of his erratic behaviors. He has been doing some very uncharacteristic things. She is concerned that some of his medications may be causing side effects.   She asked if we could have him

## 2019-03-13 NOTE — TELEPHONE ENCOUNTER
Needing a letter stating his diabetes and heart condition is under control with medication. Please call back.

## 2019-03-13 NOTE — TELEPHONE ENCOUNTER
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 Needing letter from Dr Tai Walsh Diabetes/Blood Pressure under control.     Appt given Sat 3/16 10am.  Emani CARLOS, 03/13/19, 11:31 AM

## 2019-03-13 NOTE — TELEPHONE ENCOUNTER
Diogo Ospina requesting to speak with Dr Abelardo Carreno personally regarding concerns she has with Patient.   Fazal Salcedo, 03/13/19, 9:15 AM

## 2019-03-16 ENCOUNTER — OFFICE VISIT (OUTPATIENT)
Dept: FAMILY MEDICINE CLINIC | Facility: CLINIC | Age: 64
End: 2019-03-16
Payer: COMMERCIAL

## 2019-03-16 ENCOUNTER — LAB ENCOUNTER (OUTPATIENT)
Dept: LAB | Age: 64
End: 2019-03-16
Attending: FAMILY MEDICINE
Payer: COMMERCIAL

## 2019-03-16 VITALS
TEMPERATURE: 98 F | HEART RATE: 60 BPM | RESPIRATION RATE: 16 BRPM | DIASTOLIC BLOOD PRESSURE: 70 MMHG | WEIGHT: 254.38 LBS | SYSTOLIC BLOOD PRESSURE: 130 MMHG | HEIGHT: 70.5 IN | BODY MASS INDEX: 36.01 KG/M2

## 2019-03-16 DIAGNOSIS — I10 BENIGN ESSENTIAL HYPERTENSION: ICD-10-CM

## 2019-03-16 DIAGNOSIS — E11.9 CONTROLLED TYPE 2 DIABETES MELLITUS WITHOUT COMPLICATION, WITHOUT LONG-TERM CURRENT USE OF INSULIN (HCC): ICD-10-CM

## 2019-03-16 DIAGNOSIS — K92.0 GASTROINTESTINAL HEMORRHAGE WITH HEMATEMESIS: ICD-10-CM

## 2019-03-16 DIAGNOSIS — K21.00 GASTROESOPHAGEAL REFLUX DISEASE WITH ESOPHAGITIS: ICD-10-CM

## 2019-03-16 DIAGNOSIS — I48.0 PAROXYSMAL ATRIAL FIBRILLATION (HCC): ICD-10-CM

## 2019-03-16 DIAGNOSIS — E66.01 CLASS 2 SEVERE OBESITY DUE TO EXCESS CALORIES WITH SERIOUS COMORBIDITY AND BODY MASS INDEX (BMI) OF 35.0 TO 35.9 IN ADULT (HCC): ICD-10-CM

## 2019-03-16 DIAGNOSIS — Z00.00 ROUTINE GENERAL MEDICAL EXAMINATION AT A HEALTH CARE FACILITY: Primary | ICD-10-CM

## 2019-03-16 DIAGNOSIS — F33.1 MODERATE EPISODE OF RECURRENT MAJOR DEPRESSIVE DISORDER (HCC): ICD-10-CM

## 2019-03-16 DIAGNOSIS — Z79.01 LONG TERM (CURRENT) USE OF ANTICOAGULANTS: ICD-10-CM

## 2019-03-16 DIAGNOSIS — E78.49 FAMILIAL MULTIPLE LIPOPROTEIN-TYPE HYPERLIPIDEMIA: ICD-10-CM

## 2019-03-16 LAB
ALBUMIN SERPL-MCNC: 3.8 G/DL (ref 3.4–5)
ALBUMIN/GLOB SERPL: 1.1 {RATIO} (ref 1–2)
ALP LIVER SERPL-CCNC: 80 U/L (ref 45–117)
ALT SERPL-CCNC: 36 U/L (ref 16–61)
ANION GAP SERPL CALC-SCNC: 8 MMOL/L (ref 0–18)
AST SERPL-CCNC: 19 U/L (ref 15–37)
BASOPHILS # BLD AUTO: 0.04 X10(3) UL (ref 0–0.2)
BASOPHILS NFR BLD AUTO: 0.5 %
BILIRUB SERPL-MCNC: 0.5 MG/DL (ref 0.1–2)
BUN BLD-MCNC: 15 MG/DL (ref 7–18)
BUN/CREAT SERPL: 18.1 (ref 10–20)
CALCIUM BLD-MCNC: 8.3 MG/DL (ref 8.5–10.1)
CHLORIDE SERPL-SCNC: 107 MMOL/L (ref 98–107)
CHOLEST SMN-MCNC: 165 MG/DL (ref ?–200)
CO2 SERPL-SCNC: 26 MMOL/L (ref 21–32)
CREAT BLD-MCNC: 0.83 MG/DL (ref 0.7–1.3)
CREAT UR-SCNC: 85.3 MG/DL
DEPRECATED RDW RBC AUTO: 45.5 FL (ref 35.1–46.3)
EOSINOPHIL # BLD AUTO: 0.09 X10(3) UL (ref 0–0.7)
EOSINOPHIL NFR BLD AUTO: 1.2 %
ERYTHROCYTE [DISTWIDTH] IN BLOOD BY AUTOMATED COUNT: 17.2 % (ref 11–15)
EST. AVERAGE GLUCOSE BLD GHB EST-MCNC: 177 MG/DL (ref 68–126)
GLOBULIN PLAS-MCNC: 3.5 G/DL (ref 2.8–4.4)
GLUCOSE BLD-MCNC: 132 MG/DL (ref 70–99)
HBA1C MFR BLD HPLC: 7.8 % (ref ?–5.7)
HCT VFR BLD AUTO: 49.9 % (ref 39–53)
HDLC SERPL-MCNC: 45 MG/DL (ref 40–59)
HGB BLD-MCNC: 16.3 G/DL (ref 13–17.5)
IMM GRANULOCYTES # BLD AUTO: 0.04 X10(3) UL (ref 0–1)
IMM GRANULOCYTES NFR BLD: 0.5 %
LDLC SERPL CALC-MCNC: 74 MG/DL (ref ?–100)
LYMPHOCYTES # BLD AUTO: 1.88 X10(3) UL (ref 1–4)
LYMPHOCYTES NFR BLD AUTO: 25.1 %
M PROTEIN MFR SERPL ELPH: 7.3 G/DL (ref 6.4–8.2)
MCH RBC QN AUTO: 26.4 PG (ref 26–34)
MCHC RBC AUTO-ENTMCNC: 32.7 G/DL (ref 31–37)
MCV RBC AUTO: 80.9 FL (ref 80–100)
MICROALBUMIN UR-MCNC: 1.29 MG/DL
MICROALBUMIN/CREAT 24H UR-RTO: 15.1 UG/MG (ref ?–30)
MONOCYTES # BLD AUTO: 0.59 X10(3) UL (ref 0.1–1)
MONOCYTES NFR BLD AUTO: 7.9 %
NEUTROPHILS # BLD AUTO: 4.85 X10 (3) UL (ref 1.5–7.7)
NEUTROPHILS # BLD AUTO: 4.85 X10(3) UL (ref 1.5–7.7)
NEUTROPHILS NFR BLD AUTO: 64.8 %
NONHDLC SERPL-MCNC: 120 MG/DL (ref ?–130)
OSMOLALITY SERPL CALC.SUM OF ELEC: 295 MOSM/KG (ref 275–295)
PLATELET # BLD AUTO: 207 10(3)UL (ref 150–450)
POTASSIUM SERPL-SCNC: 3.8 MMOL/L (ref 3.5–5.1)
RBC # BLD AUTO: 6.17 X10(6)UL (ref 4.3–5.7)
SODIUM SERPL-SCNC: 141 MMOL/L (ref 136–145)
TRIGL SERPL-MCNC: 232 MG/DL (ref 30–149)
TSI SER-ACNC: 3.08 MIU/ML (ref 0.36–3.74)
URATE SERPL-MCNC: 4 MG/DL (ref 3.5–7.2)
VLDLC SERPL CALC-MCNC: 46 MG/DL (ref 0–30)
WBC # BLD AUTO: 7.5 X10(3) UL (ref 4–11)

## 2019-03-16 PROCEDURE — 82570 ASSAY OF URINE CREATININE: CPT | Performed by: FAMILY MEDICINE

## 2019-03-16 PROCEDURE — 80061 LIPID PANEL: CPT | Performed by: FAMILY MEDICINE

## 2019-03-16 PROCEDURE — 80050 GENERAL HEALTH PANEL: CPT | Performed by: FAMILY MEDICINE

## 2019-03-16 PROCEDURE — 83036 HEMOGLOBIN GLYCOSYLATED A1C: CPT | Performed by: FAMILY MEDICINE

## 2019-03-16 PROCEDURE — 99396 PREV VISIT EST AGE 40-64: CPT | Performed by: FAMILY MEDICINE

## 2019-03-16 PROCEDURE — 36415 COLL VENOUS BLD VENIPUNCTURE: CPT | Performed by: FAMILY MEDICINE

## 2019-03-16 PROCEDURE — 84550 ASSAY OF BLOOD/URIC ACID: CPT | Performed by: FAMILY MEDICINE

## 2019-03-16 PROCEDURE — 82043 UR ALBUMIN QUANTITATIVE: CPT | Performed by: FAMILY MEDICINE

## 2019-03-16 RX ORDER — DAPAGLIFLOZIN 10 MG/1
1 TABLET, FILM COATED ORAL
Refills: 5 | COMMUNITY
Start: 2018-12-22 | End: 2019-05-15

## 2019-03-16 NOTE — PROGRESS NOTES
[facility] [place]  PROGRESS NOTE  Chief Complaint:   Patient presents with:  Physical: Needing  Letter for DOT      HPI:   This is a 60year old male coming in for his annual physical and follow-up on his diabetes.   He does need a letter for DO Victoza                 DIARRHEA  Current Meds:    Current Outpatient Medications:  FARXIGA 10 MG Oral Tab Take 1 tablet by mouth once daily.  Disp:  Rfl: 5   DilTIAZem HCl ER Coated Beads (DILTIAZEM CD) 180 MG Oral Capsule SR 24 Hr Take 1 capsule pressure, chest discomfort, palpitations, edema, dyspnea on exertion or at rest.  RESPIRATORY:  Denies shortness of breath, wheezing, cough or sputum. GASTROINTESTINAL:  Denies abdominal pain, nausea, vomiting, constipation, diarrhea, or blood in stool. nontender, bowel sounds normal in all 4 quadrants, no masses, no hepatosplenomegaly. Genitalia: Testes are descended bilaterally. No hernia noted. Rectal: Anal verge is normal.  Prostate is relatively small with no nodules.   Stool is brown and Hemoccult ASSAY, THYROID STIM HORMONE; Future  - URIC ACID, SERUM; Future    5. Paroxysmal atrial fibrillation (HCC)  He does have a history of atrial fib. His heart does not show any signs of any irregular rhythm now.     6. Long term (current) use of anticoagulant List:  Patient Active Problem List:     Long term (current) use of anticoagulants [Z79.01]     Atrial fibrillation ([facility] [I48.91]     Long term current use of anticoagulant therapy     Depression     Dermatitis     Dermatophytosis of body     Diabetes domi

## 2019-03-18 ENCOUNTER — TELEPHONE (OUTPATIENT)
Dept: FAMILY MEDICINE CLINIC | Facility: CLINIC | Age: 64
End: 2019-03-18

## 2019-03-18 NOTE — TELEPHONE ENCOUNTER
----- Message from Seema Manuel MD sent at 3/18/2019  8:32 AM CDT -----  Please call Mary Caba. His hemoglobin A1c is 7.8. This is much better than it was 6 months ago.   He has microalbumin to creatinine ratio is normal.  This means that the diabetes is

## 2019-03-18 NOTE — TELEPHONE ENCOUNTER
Patient informed of below. Expressed understanding. Requesting Letter for DOT Px regarding Diabetes/HTN.   Lizbeth Alcaraz, 03/18/19, 3:56 PM

## 2019-03-19 RX ORDER — WARFARIN SODIUM 5 MG/1
TABLET ORAL
Qty: 90 TABLET | Refills: 1 | Status: SHIPPED | OUTPATIENT
Start: 2019-03-19 | End: 2019-09-18

## 2019-03-19 NOTE — TELEPHONE ENCOUNTER
Future appt:    Last Appointment:  3/16/2019  Cholesterol, Total (mg/dL)   Date Value   03/16/2019 165     HDL Cholesterol (mg/dL)   Date Value   03/16/2019 45     LDL Cholesterol (mg/dL)   Date Value   03/16/2019 74     Triglycerides (mg/dL)   Date Value

## 2019-03-25 RX ORDER — DAPAGLIFLOZIN 10 MG/1
TABLET, FILM COATED ORAL
Qty: 90 TABLET | Refills: 1 | Status: SHIPPED | OUTPATIENT
Start: 2019-03-25 | End: 2019-11-02

## 2019-04-03 ENCOUNTER — TELEPHONE (OUTPATIENT)
Dept: FAMILY MEDICINE CLINIC | Facility: CLINIC | Age: 64
End: 2019-04-03

## 2019-04-04 NOTE — TELEPHONE ENCOUNTER
I called and spoke with Lucretia Jensen wife, Roni Gunter. She is concerned because he is making very poor financial decisions. She wondered if he may have an element of dementia. I told her that at his last visit he was not showing any signs of dementia.   He is gabriella

## 2019-04-08 ENCOUNTER — TELEPHONE (OUTPATIENT)
Dept: FAMILY MEDICINE CLINIC | Facility: CLINIC | Age: 64
End: 2019-04-08

## 2019-04-08 NOTE — TELEPHONE ENCOUNTER
Patient is overdue for INR. Last INR on 3/4/19. Message left to call and get INR scheduled as soon as possible.

## 2019-04-23 ENCOUNTER — TELEPHONE (OUTPATIENT)
Dept: FAMILY MEDICINE CLINIC | Facility: CLINIC | Age: 64
End: 2019-04-23

## 2019-04-23 NOTE — TELEPHONE ENCOUNTER
No response from phone call on 4/8/19 to schedule INR appointment. INR overdue. Last INR on 3/4/19. Letter mailed to patient.

## 2019-04-24 ENCOUNTER — TELEPHONE (OUTPATIENT)
Dept: FAMILY MEDICINE CLINIC | Facility: CLINIC | Age: 64
End: 2019-04-24

## 2019-04-24 NOTE — TELEPHONE ENCOUNTER
Patient states he has a productive cough of   Mucus/blood. Started today. Patient advised [facility]   For evaluation of Sx. [facility]   Sb Taylor, 04/24/19, 3:42 PM

## 2019-04-30 NOTE — TELEPHONE ENCOUNTER
Patient has been seen by Dr Tamiko Lawton since this phone note was started. It had been addressed.   Christine Leon, 04/30/19, 7:58 AM

## 2019-05-01 ENCOUNTER — TELEPHONE (OUTPATIENT)
Dept: FAMILY MEDICINE CLINIC | Facility: CLINIC | Age: 64
End: 2019-05-01

## 2019-05-01 NOTE — TELEPHONE ENCOUNTER
Patient seen in ER a week ago. Diagnosed with pneumonia. Has been taking an antibiotic. Appointment for INR scheduled today.

## 2019-05-02 ENCOUNTER — ANTI-COAG VISIT (OUTPATIENT)
Dept: FAMILY MEDICINE CLINIC | Facility: CLINIC | Age: 64
End: 2019-05-02
Payer: COMMERCIAL

## 2019-05-02 DIAGNOSIS — Z79.01 LONG TERM (CURRENT) USE OF ANTICOAGULANTS: ICD-10-CM

## 2019-05-02 DIAGNOSIS — I48.0 PAROXYSMAL ATRIAL FIBRILLATION (HCC): ICD-10-CM

## 2019-05-02 PROCEDURE — 85610 PROTHROMBIN TIME: CPT | Performed by: FAMILY MEDICINE

## 2019-05-02 PROCEDURE — 93793 ANTICOAG MGMT PT WARFARIN: CPT | Performed by: FAMILY MEDICINE

## 2019-05-02 RX ORDER — DOXYCYCLINE HYCLATE 100 MG
100 TABLET ORAL 2 TIMES DAILY
Refills: 0 | COMMUNITY
Start: 2019-04-24 | End: 2019-05-04

## 2019-05-02 NOTE — PROGRESS NOTES
Here for INR check in coumadin clinic in the office. CURRENT COUMADIN DOSE:  10mg Th, 7.5mg F and then resumed regular dose of 5mg M,W,F and 7.5mg daily the rest of the week     CHANGES OR COMPLAINTS:  ER visit on 4/24 pneumonia  Doxycycline since 4/24.

## 2019-05-08 RX ORDER — SERTRALINE HYDROCHLORIDE 100 MG/1
100 TABLET, FILM COATED ORAL
Qty: 90 TABLET | Refills: 0 | Status: SHIPPED | OUTPATIENT
Start: 2019-05-08 | End: 2019-08-06

## 2019-05-08 RX ORDER — TRAZODONE HYDROCHLORIDE 50 MG/1
1-2 TABLET ORAL NIGHTLY PRN
Refills: 0 | COMMUNITY
Start: 2019-04-12 | End: 2020-07-24 | Stop reason: ALTCHOICE

## 2019-05-08 RX ORDER — SERTRALINE HYDROCHLORIDE 100 MG/1
100 TABLET, FILM COATED ORAL
Refills: 0 | COMMUNITY
Start: 2019-04-10 | End: 2019-05-08

## 2019-05-08 NOTE — TELEPHONE ENCOUNTER
Patient recent Hospital Discharge from [facility]. Needing Sertraline Refill. Nato Perry, 05/08/19, 11:21 AM        Future appt:     Your appointments     Date & Time Appointment Department [facility])    May 09, 2019  9:00 AM CDT Anti-Coag with [facility]

## 2019-05-14 ENCOUNTER — TELEPHONE (OUTPATIENT)
Dept: FAMILY MEDICINE CLINIC | Facility: CLINIC | Age: 64
End: 2019-05-14

## 2019-05-14 NOTE — TELEPHONE ENCOUNTER
Missed INR appt. Appt rescheduled to tomorrow. Patient also has to cancel his 5/17/19 post ER check with Dr. Steffanie Ross as he starts a new job on Friday. Dania Richey, please call him back to reschedule. Thanks.

## 2019-05-15 ENCOUNTER — ANTI-COAG VISIT (OUTPATIENT)
Dept: FAMILY MEDICINE CLINIC | Facility: CLINIC | Age: 64
End: 2019-05-15
Payer: COMMERCIAL

## 2019-05-15 ENCOUNTER — HOSPITAL ENCOUNTER (OUTPATIENT)
Dept: GENERAL RADIOLOGY | Age: 64
Discharge: HOME | End: 2019-05-15
Attending: FAMILY MEDICINE
Payer: COMMERCIAL

## 2019-05-15 ENCOUNTER — OFFICE VISIT (OUTPATIENT)
Dept: FAMILY MEDICINE CLINIC | Facility: CLINIC | Age: 64
End: 2019-05-15
Payer: COMMERCIAL

## 2019-05-15 VITALS
BODY MASS INDEX: 36.56 KG/M2 | HEART RATE: 72 BPM | SYSTOLIC BLOOD PRESSURE: 132 MMHG | DIASTOLIC BLOOD PRESSURE: 82 MMHG | TEMPERATURE: 98 F | HEIGHT: 70.5 IN | WEIGHT: 258.25 LBS | OXYGEN SATURATION: 99 % | RESPIRATION RATE: 18 BRPM

## 2019-05-15 DIAGNOSIS — I48.0 PAROXYSMAL ATRIAL FIBRILLATION (HCC): ICD-10-CM

## 2019-05-15 DIAGNOSIS — E11.9 CONTROLLED TYPE 2 DIABETES MELLITUS WITHOUT COMPLICATION, WITHOUT LONG-TERM CURRENT USE OF INSULIN (HCC): ICD-10-CM

## 2019-05-15 DIAGNOSIS — J18.9 PNEUMONIA OF RIGHT MIDDLE LOBE DUE TO INFECTIOUS ORGANISM: ICD-10-CM

## 2019-05-15 DIAGNOSIS — J18.9 PNEUMONIA OF RIGHT MIDDLE LOBE DUE TO INFECTIOUS ORGANISM: Primary | ICD-10-CM

## 2019-05-15 DIAGNOSIS — Z79.01 LONG TERM (CURRENT) USE OF ANTICOAGULANTS: ICD-10-CM

## 2019-05-15 DIAGNOSIS — E78.49 FAMILIAL MULTIPLE LIPOPROTEIN-TYPE HYPERLIPIDEMIA: ICD-10-CM

## 2019-05-15 PROCEDURE — 85610 PROTHROMBIN TIME: CPT | Performed by: FAMILY MEDICINE

## 2019-05-15 PROCEDURE — 71046 X-RAY EXAM CHEST 2 VIEWS: CPT | Performed by: FAMILY MEDICINE

## 2019-05-15 PROCEDURE — 99214 OFFICE O/P EST MOD 30 MIN: CPT | Performed by: FAMILY MEDICINE

## 2019-05-15 NOTE — PROGRESS NOTES
[facility] [place]  PROGRESS NOTE  Chief Complaint:   Patient presents with:  ER F/U      HPI:   This is a 60year old male coming in for follow-up on his emergency department visit.   He was seen at the emergency department at [facility] Drug use: No    Family History:  Family History   Problem Relation Age of Onset   • Diabetes Father    • Diabetes Mother    • Heart Disorder Mother    • Hypertension Mother    • Other (Other) Mother      Allergies:    Iodine (Topical)        HIVES, ITCHING In Vitro Strip  Disp:  Rfl:    Insulin Pen Needle (BD PEN NEEDLE SHORT U/F) 31G X 8 MM Does not apply Misc  Disp:  Rfl:    ONETOUCH LANCETS Does not apply Misc  Disp:  Rfl:       Counseling given: Not Answered       REVIEW OF SYSTEMS:   CONSTITUTIONAL: See Head:  Normocephalic, atraumatic Eyes: EOMI, PERRLA, no scleral icterus, conjunctivae clear bilaterally, no eye discharge Ears: External normal. Nose: patent, no nasal discharge Throat:  No tonsillar erythema or exudate.   Mouth:  No oral lesions or ulcerat [I48.91]     Long term current use of anticoagulant therapy     Depression     Dermatitis     Dermatophytosis of body     Diabetes mellitus type II, controlled ([facility] [address] [facility]     Esophageal reflux     Routine general medical examination at a health care facility

## 2019-05-15 NOTE — PROGRESS NOTES
Here for INR check in coumadin clinic in the office.     CURRENT COUMADIN DOSE:  5mg M,W,F and 7.5mg daily the rest of the week     CHANGES OR COMPLAINTS:  No changes    INR RESULTS TODAY:  2.2 ( in goal)    PLAN:  CPM coumadin   Next INR in one month  Appo

## 2019-05-23 ENCOUNTER — TELEPHONE (OUTPATIENT)
Dept: FAMILY MEDICINE CLINIC | Facility: CLINIC | Age: 64
End: 2019-05-23

## 2019-05-24 ENCOUNTER — TELEPHONE (OUTPATIENT)
Dept: FAMILY MEDICINE CLINIC | Facility: CLINIC | Age: 64
End: 2019-05-24

## 2019-05-24 NOTE — TELEPHONE ENCOUNTER
Ming Velasquez informed Patient is currently being prescribed Sertraline 100mg. Informed new Rx sent to [facility] [address] [facility] [place]  5/8/19. Patient had informed her that he is taking  25mg. Ming Velasquez agrees with the 100mg Sertraline dose.   Wanting to leave Patient at this

## 2019-05-24 NOTE — TELEPHONE ENCOUNTER
Medical Records request was received from [facility] requesting current medications, results of the most recent physical exam, treatment of pertinent medical problems, & treatment of physical pain.     Request was sent to [employer]

## 2019-05-24 NOTE — TELEPHONE ENCOUNTER
Please clarify. Currently Russell Casanova is taking sertraline 100 mg daily. What change is recommended?

## 2019-06-10 ENCOUNTER — MED REC SCAN ONLY (OUTPATIENT)
Dept: FAMILY MEDICINE CLINIC | Facility: CLINIC | Age: 64
End: 2019-06-10

## 2019-06-10 ENCOUNTER — PATIENT OUTREACH (OUTPATIENT)
Dept: FAMILY MEDICINE CLINIC | Facility: CLINIC | Age: 64
End: 2019-06-10

## 2019-06-13 ENCOUNTER — ANTI-COAG VISIT (OUTPATIENT)
Dept: FAMILY MEDICINE CLINIC | Facility: CLINIC | Age: 64
End: 2019-06-13
Payer: COMMERCIAL

## 2019-06-13 DIAGNOSIS — I48.0 PAROXYSMAL ATRIAL FIBRILLATION (HCC): ICD-10-CM

## 2019-06-13 DIAGNOSIS — Z79.01 LONG TERM (CURRENT) USE OF ANTICOAGULANTS: ICD-10-CM

## 2019-06-13 PROCEDURE — 93793 ANTICOAG MGMT PT WARFARIN: CPT | Performed by: FAMILY MEDICINE

## 2019-06-13 PROCEDURE — 85610 PROTHROMBIN TIME: CPT | Performed by: FAMILY MEDICINE

## 2019-06-13 NOTE — PROGRESS NOTES
Here for INR check in coumadin clinic in the office.     CURRENT COUMADIN DOSE:  5mg M,W,F and 7.5mg daily the rest of the week     CHANGES OR COMPLAINTS:  No changes    INR RESULTS TODAY:  1.7 ( below goal)    PLAN:  Increase coumadin today to 10mg  [place]

## 2019-06-24 ENCOUNTER — TELEPHONE (OUTPATIENT)
Dept: FAMILY MEDICINE CLINIC | Facility: CLINIC | Age: 64
End: 2019-06-24

## 2019-06-28 ENCOUNTER — ANTI-COAG VISIT (OUTPATIENT)
Dept: FAMILY MEDICINE CLINIC | Facility: CLINIC | Age: 64
End: 2019-06-28
Payer: COMMERCIAL

## 2019-06-28 DIAGNOSIS — I48.0 PAROXYSMAL ATRIAL FIBRILLATION (HCC): ICD-10-CM

## 2019-06-28 DIAGNOSIS — Z79.01 LONG TERM (CURRENT) USE OF ANTICOAGULANTS: ICD-10-CM

## 2019-06-28 PROCEDURE — 93793 ANTICOAG MGMT PT WARFARIN: CPT | Performed by: FAMILY MEDICINE

## 2019-06-28 PROCEDURE — 85610 PROTHROMBIN TIME: CPT | Performed by: FAMILY MEDICINE

## 2019-06-28 NOTE — PROGRESS NOTES
Here for INR check in coumadin clinic in the office.     CURRENT COUMADIN DOSE:  5mg M,W,F and 7.5mg daily the rest of the week     CHANGES OR COMPLAINTS:  Eating less overall    INR RESULTS TODAY:  1.6 ( below goal)     PLAN:  Increase coumadin to 5mg M an

## 2019-07-03 ENCOUNTER — ANTI-COAG VISIT (OUTPATIENT)
Dept: FAMILY MEDICINE CLINIC | Facility: CLINIC | Age: 64
End: 2019-07-03
Payer: COMMERCIAL

## 2019-07-03 DIAGNOSIS — Z79.01 LONG TERM (CURRENT) USE OF ANTICOAGULANTS: ICD-10-CM

## 2019-07-03 DIAGNOSIS — I48.0 PAROXYSMAL ATRIAL FIBRILLATION (HCC): ICD-10-CM

## 2019-07-03 LAB — INR: 1.7 (ref 0.8–1.2)

## 2019-07-03 PROCEDURE — 85610 PROTHROMBIN TIME: CPT | Performed by: FAMILY MEDICINE

## 2019-07-03 NOTE — PROGRESS NOTES
Here for INR check in coumadin clinic in the office.     CURRENT COUMADIN DOSE:  5mg M and 7.5mg daily the rest of the week     CHANGES OR COMPLAINTS:  Diet changes    INR RESULTS TODAY:  1.7 ( inr below goal)    PLAN:  Increase coumadin dose to 7.5mg daily

## 2019-07-08 RX ORDER — WARFARIN SODIUM 2.5 MG/1
TABLET ORAL
Qty: 90 TABLET | Refills: 1 | Status: SHIPPED | OUTPATIENT
Start: 2019-07-08 | End: 2019-09-18

## 2019-07-08 NOTE — TELEPHONE ENCOUNTER
Future appt:     Your appointments     Date & Time Appointment Department [facility])    Jul 11, 2019  8:30 AM CDT Anti-Coag with [facility] [address], [place] ([place])        Sep 14, 2019  8:15 AM CDT L

## 2019-07-15 ENCOUNTER — ANTI-COAG VISIT (OUTPATIENT)
Dept: FAMILY MEDICINE CLINIC | Facility: CLINIC | Age: 64
End: 2019-07-15
Payer: COMMERCIAL

## 2019-07-15 ENCOUNTER — TELEPHONE (OUTPATIENT)
Dept: FAMILY MEDICINE CLINIC | Facility: CLINIC | Age: 64
End: 2019-07-15

## 2019-07-15 DIAGNOSIS — I48.0 PAROXYSMAL ATRIAL FIBRILLATION (HCC): ICD-10-CM

## 2019-07-15 DIAGNOSIS — Z79.01 LONG TERM (CURRENT) USE OF ANTICOAGULANTS: ICD-10-CM

## 2019-07-15 LAB — INR: 2.2 (ref 0.8–1.2)

## 2019-07-15 PROCEDURE — 93793 ANTICOAG MGMT PT WARFARIN: CPT | Performed by: FAMILY MEDICINE

## 2019-07-15 PROCEDURE — 85610 PROTHROMBIN TIME: CPT | Performed by: FAMILY MEDICINE

## 2019-07-15 NOTE — PROGRESS NOTES
Here for INR check in coumadin clinic in the office. CURRENT COUMADIN DOSE:  7.5mg daily     CHANGES OR COMPLAINTS:  No changes    INR RESULTS TODAY:  2.2 ( in goal)     PLAN:  CPM coumadin   Next INR in one month  Appointment scheduled.    Written couma

## 2019-07-15 NOTE — TELEPHONE ENCOUNTER
Please call the patient's pharmacy and ask about this price increase and if there are any financial support. Please also check [facility], as well as [facility] for cost savings. Thank you.

## 2019-07-15 NOTE — TELEPHONE ENCOUNTER
Patient takes diltiazem and the price has doubled. Went from $40 to $70.  He states he can not afford this increase. Asking if there is an alternative medication? [facility] [address] [facility] [place].

## 2019-07-15 NOTE — TELEPHONE ENCOUNTER
Spoke with [facility] pharmacy and patient has [employer] and his copay for diltiazem is now $80. Pharmacist Abundio Barboza will look into any coupons. Called [facility] back and Abundio Barboza has left for the day. Will check back with her tomorrow.

## 2019-07-16 NOTE — TELEPHONE ENCOUNTER
Spoke with pharmacy. Coupon was found and script came down to $40 for a 3 month supply. Patient notified. Advised patient to discuss with pharmacy if this coupon can be used with every refill?  If it can not, patient to call back when Dr. Jessica Haque is back

## 2019-08-06 RX ORDER — SERTRALINE HYDROCHLORIDE 100 MG/1
TABLET, FILM COATED ORAL
Qty: 90 TABLET | Refills: 1 | Status: SHIPPED | OUTPATIENT
Start: 2019-08-06 | End: 2020-02-22

## 2019-08-08 ENCOUNTER — TELEPHONE (OUTPATIENT)
Dept: FAMILY MEDICINE CLINIC | Facility: CLINIC | Age: 64
End: 2019-08-08

## 2019-08-08 NOTE — TELEPHONE ENCOUNTER
[facility] is no longer in network for [facility]. Patient would have to pay for office visit and ancillary services/ labs/tests out of pocket.  Advised patient to contact [employer] [facility] to find out which providers are in their network now asa so he can g

## 2019-08-12 ENCOUNTER — ANTI-COAG VISIT (OUTPATIENT)
Dept: FAMILY MEDICINE CLINIC | Facility: CLINIC | Age: 64
End: 2019-08-12
Payer: COMMERCIAL

## 2019-08-12 DIAGNOSIS — Z79.01 LONG TERM (CURRENT) USE OF ANTICOAGULANTS: ICD-10-CM

## 2019-08-12 DIAGNOSIS — I48.0 PAROXYSMAL ATRIAL FIBRILLATION (HCC): ICD-10-CM

## 2019-08-12 LAB — INR: 2.4 (ref 0.8–1.2)

## 2019-08-12 PROCEDURE — 93793 ANTICOAG MGMT PT WARFARIN: CPT | Performed by: FAMILY MEDICINE

## 2019-08-12 PROCEDURE — 85610 PROTHROMBIN TIME: CPT | Performed by: FAMILY MEDICINE

## 2019-08-12 NOTE — PROGRESS NOTES
Here for INR check in coumadin clinic in the office. Patient advised that we are not longer in network for his insurance and that he will need to pay the entire bill of this anticoagulation visit today.    Confirmed with patient that he still would like

## 2019-09-09 ENCOUNTER — TELEPHONE (OUTPATIENT)
Dept: FAMILY MEDICINE CLINIC | Facility: CLINIC | Age: 64
End: 2019-09-09

## 2019-09-09 NOTE — TELEPHONE ENCOUNTER
Patient due for INR . Noted to be on the schedule for blood work on 9/14/19 and there already is an order placed for venous INR to be drawn with other labs.

## 2019-09-14 ENCOUNTER — LABORATORY ENCOUNTER (OUTPATIENT)
Dept: LAB | Age: 64
End: 2019-09-14
Attending: FAMILY MEDICINE
Payer: COMMERCIAL

## 2019-09-14 DIAGNOSIS — E11.9 CONTROLLED TYPE 2 DIABETES MELLITUS WITHOUT COMPLICATION, WITHOUT LONG-TERM CURRENT USE OF INSULIN (HCC): ICD-10-CM

## 2019-09-14 DIAGNOSIS — E78.49 FAMILIAL MULTIPLE LIPOPROTEIN-TYPE HYPERLIPIDEMIA: ICD-10-CM

## 2019-09-14 DIAGNOSIS — Z79.01 LONG TERM (CURRENT) USE OF ANTICOAGULANTS: ICD-10-CM

## 2019-09-14 LAB
ALBUMIN SERPL-MCNC: 3.6 G/DL (ref 3.4–5)
ALBUMIN/GLOB SERPL: 1.1 {RATIO} (ref 1–2)
ALP LIVER SERPL-CCNC: 93 U/L (ref 45–117)
ALT SERPL-CCNC: 38 U/L (ref 16–61)
ANION GAP SERPL CALC-SCNC: 6 MMOL/L (ref 0–18)
AST SERPL-CCNC: 20 U/L (ref 15–37)
BASOPHILS # BLD AUTO: 0.04 X10(3) UL (ref 0–0.2)
BASOPHILS NFR BLD AUTO: 0.6 %
BILIRUB SERPL-MCNC: 0.4 MG/DL (ref 0.1–2)
BUN BLD-MCNC: 18 MG/DL (ref 7–18)
BUN/CREAT SERPL: 21.7 (ref 10–20)
CALCIUM BLD-MCNC: 8.7 MG/DL (ref 8.5–10.1)
CHLORIDE SERPL-SCNC: 107 MMOL/L (ref 98–112)
CHOLEST SMN-MCNC: 189 MG/DL (ref ?–200)
CO2 SERPL-SCNC: 27 MMOL/L (ref 21–32)
COMPLEXED PSA SERPL-MCNC: 0.59 NG/ML (ref ?–4)
CREAT BLD-MCNC: 0.83 MG/DL (ref 0.7–1.3)
CREAT UR-SCNC: 83.1 MG/DL
DEPRECATED RDW RBC AUTO: 45.7 FL (ref 35.1–46.3)
EOSINOPHIL # BLD AUTO: 0.17 X10(3) UL (ref 0–0.7)
EOSINOPHIL NFR BLD AUTO: 2.5 %
ERYTHROCYTE [DISTWIDTH] IN BLOOD BY AUTOMATED COUNT: 15.3 % (ref 11–15)
EST. AVERAGE GLUCOSE BLD GHB EST-MCNC: 189 MG/DL (ref 68–126)
GLOBULIN PLAS-MCNC: 3.4 G/DL (ref 2.8–4.4)
GLUCOSE BLD-MCNC: 170 MG/DL (ref 70–99)
HBA1C MFR BLD HPLC: 8.2 % (ref ?–5.7)
HCT VFR BLD AUTO: 49.9 % (ref 39–53)
HDLC SERPL-MCNC: 35 MG/DL (ref 40–59)
HGB BLD-MCNC: 15.7 G/DL (ref 13–17.5)
IMM GRANULOCYTES # BLD AUTO: 0.03 X10(3) UL (ref 0–1)
IMM GRANULOCYTES NFR BLD: 0.4 %
INR BLD: 1.37 (ref 0.9–1.1)
LDLC SERPL DIRECT ASSAY-MCNC: 83 MG/DL (ref ?–100)
LYMPHOCYTES # BLD AUTO: 1.76 X10(3) UL (ref 1–4)
LYMPHOCYTES NFR BLD AUTO: 26.1 %
M PROTEIN MFR SERPL ELPH: 7 G/DL (ref 6.4–8.2)
MCH RBC QN AUTO: 25.7 PG (ref 26–34)
MCHC RBC AUTO-ENTMCNC: 31.5 G/DL (ref 31–37)
MCV RBC AUTO: 81.5 FL (ref 80–100)
MICROALBUMIN UR-MCNC: 0.84 MG/DL
MICROALBUMIN/CREAT 24H UR-RTO: 10.1 UG/MG (ref ?–30)
MONOCYTES # BLD AUTO: 0.6 X10(3) UL (ref 0.1–1)
MONOCYTES NFR BLD AUTO: 8.9 %
NEUTROPHILS # BLD AUTO: 4.15 X10 (3) UL (ref 1.5–7.7)
NEUTROPHILS # BLD AUTO: 4.15 X10(3) UL (ref 1.5–7.7)
NEUTROPHILS NFR BLD AUTO: 61.5 %
NONHDLC SERPL-MCNC: 154 MG/DL (ref ?–130)
OSMOLALITY SERPL CALC.SUM OF ELEC: 296 MOSM/KG (ref 275–295)
PLATELET # BLD AUTO: 185 10(3)UL (ref 150–450)
POTASSIUM SERPL-SCNC: 4.1 MMOL/L (ref 3.5–5.1)
PSA SERPL DL<=0.01 NG/ML-MCNC: 17.6 SECONDS (ref 12.5–14.7)
RBC # BLD AUTO: 6.12 X10(6)UL (ref 4.3–5.7)
SODIUM SERPL-SCNC: 140 MMOL/L (ref 136–145)
TRIGL SERPL-MCNC: 409 MG/DL (ref 30–149)
TSI SER-ACNC: 2.43 MIU/ML (ref 0.36–3.74)
URATE SERPL-MCNC: 4.4 MG/DL (ref 3.5–7.2)
WBC # BLD AUTO: 6.8 X10(3) UL (ref 4–11)

## 2019-09-14 PROCEDURE — 83036 HEMOGLOBIN GLYCOSYLATED A1C: CPT | Performed by: FAMILY MEDICINE

## 2019-09-14 PROCEDURE — 82043 UR ALBUMIN QUANTITATIVE: CPT | Performed by: FAMILY MEDICINE

## 2019-09-14 PROCEDURE — 80050 GENERAL HEALTH PANEL: CPT | Performed by: FAMILY MEDICINE

## 2019-09-14 PROCEDURE — 82570 ASSAY OF URINE CREATININE: CPT | Performed by: FAMILY MEDICINE

## 2019-09-14 PROCEDURE — 83721 ASSAY OF BLOOD LIPOPROTEIN: CPT | Performed by: FAMILY MEDICINE

## 2019-09-14 PROCEDURE — 80061 LIPID PANEL: CPT | Performed by: FAMILY MEDICINE

## 2019-09-14 PROCEDURE — 84153 ASSAY OF PSA TOTAL: CPT | Performed by: FAMILY MEDICINE

## 2019-09-14 PROCEDURE — 36415 COLL VENOUS BLD VENIPUNCTURE: CPT | Performed by: FAMILY MEDICINE

## 2019-09-14 PROCEDURE — 84550 ASSAY OF BLOOD/URIC ACID: CPT | Performed by: FAMILY MEDICINE

## 2019-09-14 PROCEDURE — 85610 PROTHROMBIN TIME: CPT | Performed by: FAMILY MEDICINE

## 2019-09-16 ENCOUNTER — ANTI-COAG VISIT (OUTPATIENT)
Dept: FAMILY MEDICINE CLINIC | Facility: CLINIC | Age: 64
End: 2019-09-16

## 2019-09-16 DIAGNOSIS — Z79.01 LONG TERM (CURRENT) USE OF ANTICOAGULANTS: ICD-10-CM

## 2019-09-16 DIAGNOSIS — I48.0 PAROXYSMAL ATRIAL FIBRILLATION (HCC): ICD-10-CM

## 2019-09-16 PROCEDURE — 93793 ANTICOAG MGMT PT WARFARIN: CPT | Performed by: FAMILY MEDICINE

## 2019-09-16 NOTE — PROGRESS NOTES
INR drawn by [facility]    CURRENT COUMADIN DOSE:  7.5mg daily     CHANGES OR COMPLAINTS:  No changes  Denies missing coumadin dosage    INR RESULTS :  1.37 ( below goal)     PLAN:  10mg coumadin today  Tomorrow resume 7.5mg daily   Next INR in one week

## 2019-09-18 ENCOUNTER — OFFICE VISIT (OUTPATIENT)
Dept: FAMILY MEDICINE CLINIC | Facility: CLINIC | Age: 64
End: 2019-09-18
Payer: COMMERCIAL

## 2019-09-18 VITALS
SYSTOLIC BLOOD PRESSURE: 124 MMHG | BODY MASS INDEX: 36.81 KG/M2 | HEART RATE: 65 BPM | WEIGHT: 260 LBS | OXYGEN SATURATION: 98 % | DIASTOLIC BLOOD PRESSURE: 66 MMHG | TEMPERATURE: 99 F | HEIGHT: 70.5 IN | RESPIRATION RATE: 16 BRPM

## 2019-09-18 DIAGNOSIS — F33.1 MODERATE EPISODE OF RECURRENT MAJOR DEPRESSIVE DISORDER (HCC): ICD-10-CM

## 2019-09-18 DIAGNOSIS — I10 BENIGN ESSENTIAL HYPERTENSION: ICD-10-CM

## 2019-09-18 DIAGNOSIS — E78.49 FAMILIAL MULTIPLE LIPOPROTEIN-TYPE HYPERLIPIDEMIA: ICD-10-CM

## 2019-09-18 DIAGNOSIS — E66.01 CLASS 2 SEVERE OBESITY DUE TO EXCESS CALORIES WITH SERIOUS COMORBIDITY AND BODY MASS INDEX (BMI) OF 35.0 TO 35.9 IN ADULT (HCC): ICD-10-CM

## 2019-09-18 DIAGNOSIS — L30.9 DERMATITIS: ICD-10-CM

## 2019-09-18 DIAGNOSIS — E11.9 CONTROLLED TYPE 2 DIABETES MELLITUS WITHOUT COMPLICATION, WITHOUT LONG-TERM CURRENT USE OF INSULIN (HCC): Primary | ICD-10-CM

## 2019-09-18 DIAGNOSIS — I48.0 PAROXYSMAL ATRIAL FIBRILLATION (HCC): ICD-10-CM

## 2019-09-18 DIAGNOSIS — Z23 NEED FOR VACCINATION: ICD-10-CM

## 2019-09-18 DIAGNOSIS — Z79.01 LONG TERM (CURRENT) USE OF ANTICOAGULANTS: ICD-10-CM

## 2019-09-18 PROBLEM — J18.9 PNEUMONIA OF RIGHT MIDDLE LOBE DUE TO INFECTIOUS ORGANISM: Status: RESOLVED | Noted: 2019-05-15 | Resolved: 2019-09-18

## 2019-09-18 PROCEDURE — 99214 OFFICE O/P EST MOD 30 MIN: CPT | Performed by: FAMILY MEDICINE

## 2019-09-18 PROCEDURE — 90471 IMMUNIZATION ADMIN: CPT | Performed by: FAMILY MEDICINE

## 2019-09-18 PROCEDURE — 90472 IMMUNIZATION ADMIN EACH ADD: CPT | Performed by: FAMILY MEDICINE

## 2019-09-18 PROCEDURE — 90686 IIV4 VACC NO PRSV 0.5 ML IM: CPT | Performed by: FAMILY MEDICINE

## 2019-09-18 PROCEDURE — 90732 PPSV23 VACC 2 YRS+ SUBQ/IM: CPT | Performed by: FAMILY MEDICINE

## 2019-09-18 RX ORDER — WARFARIN SODIUM 2.5 MG/1
2.5 TABLET ORAL DAILY
Qty: 90 TABLET | Refills: 1 | Status: SHIPPED | OUTPATIENT
Start: 2019-09-18 | End: 2020-03-09

## 2019-09-18 RX ORDER — ROSUVASTATIN CALCIUM 10 MG/1
10 TABLET, COATED ORAL NIGHTLY
Qty: 90 TABLET | Refills: 1 | Status: SHIPPED | OUTPATIENT
Start: 2019-09-18 | End: 2020-04-27

## 2019-09-18 RX ORDER — WARFARIN SODIUM 5 MG/1
TABLET ORAL
Qty: 90 TABLET | Refills: 1 | Status: SHIPPED | OUTPATIENT
Start: 2019-09-18 | End: 2020-04-21

## 2019-09-18 RX ORDER — LISINOPRIL 10 MG/1
10 TABLET ORAL DAILY
Qty: 90 TABLET | Refills: 1 | Status: SHIPPED | OUTPATIENT
Start: 2019-09-18 | End: 2020-07-27

## 2019-09-18 RX ORDER — DILTIAZEM HYDROCHLORIDE 180 MG/1
180 CAPSULE, COATED, EXTENDED RELEASE ORAL DAILY
Qty: 90 CAPSULE | Refills: 1 | Status: SHIPPED | OUTPATIENT
Start: 2019-09-18 | End: 2020-05-11

## 2019-09-18 NOTE — PROGRESS NOTES
[address]  PROGRESS NOTE  Chief Complaint:   Patient presents with: Follow - Up      HPI:   This is a 60year old male coming in for follow-up on his diabetes. He said that he is taking his medicines faithfully.   He said though that h 409 (H) 30 - 149 mg/dL    LDL Cholesterol      VLDL      Non HDL Chol 154 (H) <130 mg/dL   HEMOGLOBIN A1C   Result Value Ref Range    HgbA1C 8.2 (H) <5.7 %    Estimated Average Glucose 189 (H) 68 - 126 mg/dL   COMP METABOLIC PANEL (14)   Result Value Ref R rhinitis    • Atrial fibrillation (HCC)    • Depression    • Diabetes ([facility] [address]    • Esophageal reflux    • Essential hypertension    • Hyperlipidemia    • Obesity    • S/p small bowel obstruction    • Suicide ideation 2018     Past Surgical History:   Procedu EVERY DAY Disp: 90 tablet Rfl: 1   traZODone HCl 50 MG Oral Tab Take 1-2 tablets by mouth nightly as needed.  Disp:  Rfl: 0   FARXIGA 10 MG Oral Tab TAKE 1 TABLET BY MOUTH EVERY DAY Disp: 90 tablet Rfl: 1   omeprazole 20 MG Oral Capsule Delayed Release Take excessive sweating, cold or heat intolerance, polyuria or polydipsia. ALLERGIES:  Denies allergic response, history of asthma, sneezing, hives, eczema or rhinitis.      EXAM:   /66 (BP Location: Left arm, Patient Position: Sitting, Cuff Size: large) related to changes in his diet. Plan: Discussion about how to get it back down again. He does agree to change his diet. He does not want to start insulin. Recheck hemoglobin A1c in 3 months.   If it is still above 8 we will need to talk about insulin th mouth daily. • Rosuvastatin Calcium 10 MG Oral Tab 90 tablet 1     Sig: Take 1 tablet (10 mg total) by mouth nightly. • Warfarin Sodium 2.5 MG Oral Tab 90 tablet 1     Sig: Take 1 tablet (2.5 mg total) by mouth daily.    • Warfarin Sodium 5 MG Oral Tab

## 2019-09-23 ENCOUNTER — ANTI-COAG VISIT (OUTPATIENT)
Dept: FAMILY MEDICINE CLINIC | Facility: CLINIC | Age: 64
End: 2019-09-23
Payer: COMMERCIAL

## 2019-09-23 DIAGNOSIS — Z79.01 LONG TERM (CURRENT) USE OF ANTICOAGULANTS: ICD-10-CM

## 2019-09-23 DIAGNOSIS — I48.0 PAROXYSMAL ATRIAL FIBRILLATION (HCC): ICD-10-CM

## 2019-09-23 LAB — INR: 1.5 (ref 0.8–1.2)

## 2019-09-23 PROCEDURE — 93793 ANTICOAG MGMT PT WARFARIN: CPT | Performed by: FAMILY MEDICINE

## 2019-09-23 PROCEDURE — 85610 PROTHROMBIN TIME: CPT | Performed by: FAMILY MEDICINE

## 2019-09-23 NOTE — PROGRESS NOTES
Here for INR check in coumadin clinic in the office.     CURRENT COUMADIN DOSE:  10mg M and 5mg daily the rest of the week     CHANGES OR COMPLAINTS:  No changes    INR RESULTS TODAY:  1.5 ( rising, but still below goal)     PLAN:  Increase coumadin to 10mg

## 2019-09-30 ENCOUNTER — ANTI-COAG VISIT (OUTPATIENT)
Dept: FAMILY MEDICINE CLINIC | Facility: CLINIC | Age: 64
End: 2019-09-30
Payer: COMMERCIAL

## 2019-09-30 DIAGNOSIS — Z79.01 LONG TERM (CURRENT) USE OF ANTICOAGULANTS: ICD-10-CM

## 2019-09-30 DIAGNOSIS — I48.0 PAROXYSMAL ATRIAL FIBRILLATION (HCC): ICD-10-CM

## 2019-09-30 LAB — INR: 2.1 (ref 0.8–1.2)

## 2019-09-30 PROCEDURE — 93793 ANTICOAG MGMT PT WARFARIN: CPT | Performed by: FAMILY MEDICINE

## 2019-09-30 PROCEDURE — 85610 PROTHROMBIN TIME: CPT | Performed by: FAMILY MEDICINE

## 2019-09-30 NOTE — PROGRESS NOTES
Here for INR check in coumadin clinic in the office. CURRENT COUMADIN DOSE:  10mg M,F and 7.5mg daily the rest of the week     CHANGES OR COMPLAINTS:  Ran out of lisinopril. Has not taken for a few days.  Will get filled tomorrow    INR RESULTS TODAY:  2

## 2019-10-09 ENCOUNTER — ANTI-COAG VISIT (OUTPATIENT)
Dept: FAMILY MEDICINE CLINIC | Facility: CLINIC | Age: 64
End: 2019-10-09
Payer: COMMERCIAL

## 2019-10-09 ENCOUNTER — TELEPHONE (OUTPATIENT)
Dept: FAMILY MEDICINE CLINIC | Facility: CLINIC | Age: 64
End: 2019-10-09

## 2019-10-09 DIAGNOSIS — I48.0 PAROXYSMAL ATRIAL FIBRILLATION (HCC): ICD-10-CM

## 2019-10-09 DIAGNOSIS — Z79.01 LONG TERM (CURRENT) USE OF ANTICOAGULANTS: ICD-10-CM

## 2019-10-09 PROCEDURE — 85610 PROTHROMBIN TIME: CPT | Performed by: FAMILY MEDICINE

## 2019-10-09 PROCEDURE — 93793 ANTICOAG MGMT PT WARFARIN: CPT | Performed by: FAMILY MEDICINE

## 2019-10-09 NOTE — TELEPHONE ENCOUNTER
Future appt:     Your appointments     Date & Time Appointment Department [facility])    Oct 23, 2019 10:15 AM CDT Anti-Coag with EMG [address] [place] [address],[address], [place] (Austin Gomez)        Dec 13, 2019  8:45 AM CST L

## 2019-10-09 NOTE — PROGRESS NOTES
Here for INR check in coumadin clinic in the office.     CURRENT COUMADIN DOSE:  7.5mg daily     CHANGES OR COMPLAINTS:  No changes    INR RESULTS TODAY:  2.9 ( in goal but rising)    PLAN:  Decrease coumadin to 5mg M,F and 7.5mg daily the rest of the week

## 2019-10-23 ENCOUNTER — ANTI-COAG VISIT (OUTPATIENT)
Dept: FAMILY MEDICINE CLINIC | Facility: CLINIC | Age: 64
End: 2019-10-23

## 2019-10-23 DIAGNOSIS — Z79.01 LONG TERM (CURRENT) USE OF ANTICOAGULANTS: ICD-10-CM

## 2019-10-23 DIAGNOSIS — I48.0 PAROXYSMAL ATRIAL FIBRILLATION (HCC): ICD-10-CM

## 2019-10-23 PROCEDURE — 85610 PROTHROMBIN TIME: CPT | Performed by: FAMILY MEDICINE

## 2019-10-23 PROCEDURE — 93793 ANTICOAG MGMT PT WARFARIN: CPT | Performed by: FAMILY MEDICINE

## 2019-10-25 ENCOUNTER — TELEPHONE (OUTPATIENT)
Dept: FAMILY MEDICINE CLINIC | Facility: CLINIC | Age: 64
End: 2019-10-25

## 2019-10-25 PROBLEM — I48.21 PERMANENT ATRIAL FIBRILLATION (HCC): Status: ACTIVE | Noted: 2017-01-30

## 2019-10-28 ENCOUNTER — TELEPHONE (OUTPATIENT)
Dept: FAMILY MEDICINE CLINIC | Facility: CLINIC | Age: 64
End: 2019-10-28

## 2019-10-28 NOTE — TELEPHONE ENCOUNTER
Dr. Zilphia Krabbe updated home INR prescription and completed a medical records Attestation Statement as requested by [facility] [address] for home INR monitoring service.      Above forms faxed to [employer]

## 2019-11-04 ENCOUNTER — TELEPHONE (OUTPATIENT)
Dept: FAMILY MEDICINE CLINIC | Facility: CLINIC | Age: 64
End: 2019-11-04

## 2019-11-04 RX ORDER — DAPAGLIFLOZIN 10 MG/1
TABLET, FILM COATED ORAL
Qty: 90 TABLET | Refills: 1 | Status: SHIPPED | OUTPATIENT
Start: 2019-11-04 | End: 2020-05-11

## 2019-11-04 NOTE — TELEPHONE ENCOUNTER
Future appt:     Your appointments     Date & Time Appointment Department [facility])    Nov 06, 2019  8:30 AM CST Anti-Coag with EMG [address] [facility], [place] (Austin Gomez)        Dec 13, 2019  8:45 AM CST L

## 2019-11-06 ENCOUNTER — ANTI-COAG VISIT (OUTPATIENT)
Dept: FAMILY MEDICINE CLINIC | Facility: CLINIC | Age: 64
End: 2019-11-06
Payer: COMMERCIAL

## 2019-11-06 DIAGNOSIS — I48.21 PERMANENT ATRIAL FIBRILLATION (HCC): ICD-10-CM

## 2019-11-06 DIAGNOSIS — Z79.01 LONG TERM (CURRENT) USE OF ANTICOAGULANTS: ICD-10-CM

## 2019-11-06 PROCEDURE — 85610 PROTHROMBIN TIME: CPT | Performed by: FAMILY MEDICINE

## 2019-11-06 PROCEDURE — 93793 ANTICOAG MGMT PT WARFARIN: CPT | Performed by: FAMILY MEDICINE

## 2019-11-06 NOTE — PROGRESS NOTES
Here for INR check in coumadin clinic in the office. CURRENT COUMADIN DOSE:  7.5mg daily    CHANGES OR COMPLAINTS:  No changes    INR RESULTS TODAY:  2.1 ( in goal)    PLAN:  CPM coumadin   Next INR in one month  Appointment scheduled.    Written coumadi

## 2019-12-11 ENCOUNTER — ANTI-COAG VISIT (OUTPATIENT)
Dept: FAMILY MEDICINE CLINIC | Facility: CLINIC | Age: 64
End: 2019-12-11
Payer: COMMERCIAL

## 2019-12-11 DIAGNOSIS — I48.21 PERMANENT ATRIAL FIBRILLATION (HCC): ICD-10-CM

## 2019-12-11 DIAGNOSIS — Z79.01 LONG TERM (CURRENT) USE OF ANTICOAGULANTS: ICD-10-CM

## 2019-12-11 PROCEDURE — 93793 ANTICOAG MGMT PT WARFARIN: CPT | Performed by: FAMILY MEDICINE

## 2019-12-11 PROCEDURE — 85610 PROTHROMBIN TIME: CPT | Performed by: FAMILY MEDICINE

## 2019-12-11 RX ORDER — OMEPRAZOLE 20 MG/1
20 CAPSULE, DELAYED RELEASE ORAL
Qty: 90 CAPSULE | Refills: 1 | Status: SHIPPED | OUTPATIENT
Start: 2019-12-11 | End: 2020-07-27

## 2019-12-11 NOTE — PROGRESS NOTES
Here for INR check in coumadin clinic in the office.     CURRENT COUMADIN DOSE:  7.5mg daily    CHANGES OR COMPLAINTS:  ER for chest pain on 12/6/19 and admitted for observation  Missed coumadin dose on 12/6/19    INR RESULTS TODAY:  1.7 ( below goal)    PL

## 2019-12-11 NOTE — TELEPHONE ENCOUNTER
Future appt:     Your appointments     Date & Time Appointment Department [facility])    Dec 11, 2019 10:45 AM CST Anti-Coag with EMG [address], [place] (Austin Gomez)        Dec 13, 2019  8:45 AM CST L

## 2019-12-12 ENCOUNTER — TELEPHONE (OUTPATIENT)
Dept: FAMILY MEDICINE CLINIC | Facility: CLINIC | Age: 64
End: 2019-12-12

## 2019-12-12 DIAGNOSIS — E11.9 CONTROLLED TYPE 2 DIABETES MELLITUS WITHOUT COMPLICATION, WITHOUT LONG-TERM CURRENT USE OF INSULIN (HCC): Primary | ICD-10-CM

## 2019-12-12 NOTE — TELEPHONE ENCOUNTER
----- Message from Andrew Sung sent at 12/12/2019  8:02 AM CST -----  Regarding: lab orders needed   Patient has lab appointment, No orders n chart      Thanks,  [employer]

## 2019-12-13 ENCOUNTER — APPOINTMENT (OUTPATIENT)
Dept: LAB | Age: 64
End: 2019-12-13
Attending: FAMILY MEDICINE
Payer: COMMERCIAL

## 2019-12-13 DIAGNOSIS — E11.9 CONTROLLED TYPE 2 DIABETES MELLITUS WITHOUT COMPLICATION, WITHOUT LONG-TERM CURRENT USE OF INSULIN (HCC): ICD-10-CM

## 2019-12-13 PROCEDURE — 80053 COMPREHEN METABOLIC PANEL: CPT | Performed by: FAMILY MEDICINE

## 2019-12-13 PROCEDURE — 83036 HEMOGLOBIN GLYCOSYLATED A1C: CPT | Performed by: FAMILY MEDICINE

## 2019-12-13 PROCEDURE — 36415 COLL VENOUS BLD VENIPUNCTURE: CPT | Performed by: FAMILY MEDICINE

## 2019-12-26 ENCOUNTER — ANTI-COAG VISIT (OUTPATIENT)
Dept: FAMILY MEDICINE CLINIC | Facility: CLINIC | Age: 64
End: 2019-12-26
Payer: COMMERCIAL

## 2019-12-26 DIAGNOSIS — Z79.01 LONG TERM (CURRENT) USE OF ANTICOAGULANTS: ICD-10-CM

## 2019-12-26 DIAGNOSIS — I48.21 PERMANENT ATRIAL FIBRILLATION (HCC): ICD-10-CM

## 2019-12-26 PROCEDURE — 93793 ANTICOAG MGMT PT WARFARIN: CPT | Performed by: FAMILY MEDICINE

## 2019-12-26 PROCEDURE — 85610 PROTHROMBIN TIME: CPT | Performed by: FAMILY MEDICINE

## 2019-12-26 NOTE — PROGRESS NOTES
Here for INR check in coumadin clinic in the office. CURRENT COUMADIN DOSE:  7.5mg daily    CHANGES OR COMPLAINTS:  No changes    INR RESULTS TODAY:  2.6 ( in goal)    PLAN:  CPM coumadin   Next INR due in one month  Appointment scheduled.    Written cou

## 2020-01-24 ENCOUNTER — OFFICE VISIT (OUTPATIENT)
Dept: FAMILY MEDICINE CLINIC | Facility: CLINIC | Age: 65
End: 2020-01-24
Payer: COMMERCIAL

## 2020-01-24 ENCOUNTER — ANTI-COAG VISIT (OUTPATIENT)
Dept: FAMILY MEDICINE CLINIC | Facility: CLINIC | Age: 65
End: 2020-01-24

## 2020-01-24 VITALS
WEIGHT: 258.81 LBS | RESPIRATION RATE: 20 BRPM | HEIGHT: 70.5 IN | BODY MASS INDEX: 36.64 KG/M2 | HEART RATE: 80 BPM | DIASTOLIC BLOOD PRESSURE: 70 MMHG | SYSTOLIC BLOOD PRESSURE: 122 MMHG | TEMPERATURE: 99 F

## 2020-01-24 DIAGNOSIS — Z79.01 LONG TERM (CURRENT) USE OF ANTICOAGULANTS: ICD-10-CM

## 2020-01-24 DIAGNOSIS — F33.1 DEPRESSION, MAJOR, RECURRENT, MODERATE (HCC): ICD-10-CM

## 2020-01-24 DIAGNOSIS — E66.01 CLASS 2 SEVERE OBESITY DUE TO EXCESS CALORIES WITH SERIOUS COMORBIDITY AND BODY MASS INDEX (BMI) OF 35.0 TO 35.9 IN ADULT (HCC): ICD-10-CM

## 2020-01-24 DIAGNOSIS — R07.89 OTHER CHEST PAIN: ICD-10-CM

## 2020-01-24 DIAGNOSIS — E11.9 CONTROLLED TYPE 2 DIABETES MELLITUS WITHOUT COMPLICATION, WITHOUT LONG-TERM CURRENT USE OF INSULIN (HCC): Primary | ICD-10-CM

## 2020-01-24 DIAGNOSIS — E78.49 FAMILIAL MULTIPLE LIPOPROTEIN-TYPE HYPERLIPIDEMIA: ICD-10-CM

## 2020-01-24 DIAGNOSIS — I10 BENIGN ESSENTIAL HYPERTENSION: ICD-10-CM

## 2020-01-24 DIAGNOSIS — K21.00 GASTROESOPHAGEAL REFLUX DISEASE WITH ESOPHAGITIS: ICD-10-CM

## 2020-01-24 DIAGNOSIS — I48.21 PERMANENT ATRIAL FIBRILLATION (HCC): ICD-10-CM

## 2020-01-24 PROBLEM — Z95.1 HX OF CABG: Status: ACTIVE | Noted: 2019-12-07

## 2020-01-24 PROBLEM — I25.10 CORONARY ARTERY DISEASE INVOLVING NATIVE CORONARY ARTERY OF NATIVE HEART WITHOUT ANGINA PECTORIS: Status: ACTIVE | Noted: 2019-12-07

## 2020-01-24 LAB — INR: 2.2 (ref 0.8–1.2)

## 2020-01-24 PROCEDURE — 99214 OFFICE O/P EST MOD 30 MIN: CPT | Performed by: FAMILY MEDICINE

## 2020-01-24 PROCEDURE — 85610 PROTHROMBIN TIME: CPT | Performed by: FAMILY MEDICINE

## 2020-01-24 RX ORDER — CHOLECALCIFEROL (VITAMIN D3) 125 MCG
5 CAPSULE ORAL NIGHTLY PRN
COMMUNITY

## 2020-01-24 NOTE — PROGRESS NOTES
[facility] [place]  PROGRESS NOTE  Chief Complaint:   Patient presents with:  Diabetes  Follow - Up      HPI:   This is a 60year old male coming in for follow-up on his diabetes. He is very discouraged.   He said that he is working part-time History:   Procedure Laterality Date   • APPENDECTOMY     • BACK SURGERY     • COLONOSCOPY     • HAND ORTHOSIS, METACARPAL FRACTURE ORTHOSIS, PREFABRICATED, INCLUDES FITTING  2010   • KNEE SCOPE,MED/LAT MENISECTOMY  02/28/2007   • REPAIR ROTATOR CUFF,CHRON MG Oral Tab 1 Tab PO Daily 90 tablet 1   • SERTRALINE  MG Oral Tab TAKE 1 TABLET BY MOUTH EVERY DAY 90 tablet 1   • traZODone HCl 50 MG Oral Tab Take 1-2 tablets by mouth nightly as needed.   0   • methocarbamol 500 MG Oral Tab Take 1 tablet by mouth Pulse 80   Temp 98.7 °F (37.1 °C) (Tympanic)   Resp 20   Ht 70.5\"   Wt 258 lb 12.8 oz (117.4 kg)   BMI 36.61 kg/m²  Estimated body mass index is 36.61 kg/m² as calculated from the following:    Height as of this encounter: 70.5\".     Weight as of this enc intermittent atrial fibrillation. Right now his heart rhythm appears to be sinus. 4. Familial multiple lipoprotein-type hyperlipidemia  He does have hyperlipidemia. He declines taking his medicine for cholesterol.     5. Other chest pain  He has recurr Esophageal reflux     Routine general medical examination at a health care facility     Personal history of allergy to medicinal agent     Familial multiple lipoprotein-type hyperlipidemia     Benign essential hypertension     Intertrigo     Lipoma     Ch

## 2020-01-24 NOTE — PROGRESS NOTES
Continue to take the same dose of warfarin. Recheck in 1 month. H&P was completed on 11/30/17   and  has been reviewed,   the patient has been seen today  and:  I concur with the findings and no changes have occurred since H&P was written.    Consents reviewed  All questions answered  Will proceed with scheduled procedure    Wayne Boykin M.D.   OB/GYN

## 2020-01-24 NOTE — PROGRESS NOTES
Patient informed by  at time of office visit regarding Wafarin Dose/Return date.   Julieta Tipton, 01/24/20, 1:54 PM

## 2020-02-22 RX ORDER — SERTRALINE HYDROCHLORIDE 100 MG/1
TABLET, FILM COATED ORAL
Qty: 90 TABLET | Refills: 1 | Status: SHIPPED | OUTPATIENT
Start: 2020-02-22 | End: 2020-09-04

## 2020-02-24 ENCOUNTER — ANTI-COAG VISIT (OUTPATIENT)
Dept: FAMILY MEDICINE CLINIC | Facility: CLINIC | Age: 65
End: 2020-02-24
Payer: COMMERCIAL

## 2020-02-24 DIAGNOSIS — I48.21 PERMANENT ATRIAL FIBRILLATION (HCC): ICD-10-CM

## 2020-02-24 DIAGNOSIS — Z79.01 LONG TERM (CURRENT) USE OF ANTICOAGULANTS: ICD-10-CM

## 2020-02-24 LAB — INR: 1.9 (ref 0.8–1.2)

## 2020-02-24 PROCEDURE — 93793 ANTICOAG MGMT PT WARFARIN: CPT | Performed by: FAMILY MEDICINE

## 2020-02-24 PROCEDURE — 85610 PROTHROMBIN TIME: CPT | Performed by: FAMILY MEDICINE

## 2020-02-24 NOTE — PROGRESS NOTES
Here for INR check in coumadin clinic in the office. CURRENT COUMADIN DOSE:  7.5mg daily    CHANGES OR COMPLAINTS:  URI    INR RESULTS TODAY:  1.9     PLAN:  CPM coumadin   Next INR in one month  Appointment scheduled.    Written coumadin dosing instruct

## 2020-03-09 RX ORDER — WARFARIN SODIUM 2.5 MG/1
TABLET ORAL
Qty: 90 TABLET | Refills: 1 | Status: SHIPPED | OUTPATIENT
Start: 2020-03-09 | End: 2020-04-21 | Stop reason: ALTCHOICE

## 2020-03-09 NOTE — TELEPHONE ENCOUNTER
Future appt:     Your appointments     Date & Time Appointment Department ([address])    Mar 23, 2020  9:00 AM CDT Anti-Coag with EMG [address] [place] [address],[address], [place] ([facility])        Jul 20, 2020  8:15 AM CDT L

## 2020-03-24 ENCOUNTER — TELEPHONE (OUTPATIENT)
Dept: FAMILY MEDICINE CLINIC | Facility: CLINIC | Age: 65
End: 2020-03-24

## 2020-03-24 NOTE — TELEPHONE ENCOUNTER
Patient informed  will be back in the office on Friday. Informed will let him know when letter complete/agreed.   Anyi Waddell, 03/24/20, 4:18 PM

## 2020-04-16 ENCOUNTER — TELEPHONE (OUTPATIENT)
Dept: FAMILY MEDICINE CLINIC | Facility: CLINIC | Age: 65
End: 2020-04-16

## 2020-04-16 NOTE — TELEPHONE ENCOUNTER
INR overdue. Last INR was 2/24/20. Last INR 1.9. States he is not taking coumadin because  He does not have any money to buy prescription. Patient takes coumadin for Afib. [facility]  He states they c

## 2020-04-16 NOTE — TELEPHONE ENCOUNTER
Please confirm what medications he is taking routinely, last note from 01/24/20 indicates he was taking 3-4 regularly and warfarin being one of them. I would like to known what he is taking for sure.     Not taking his warfarin and having therapeutic INR w

## 2020-04-21 RX ORDER — WARFARIN SODIUM 5 MG/1
7.5 TABLET ORAL DAILY
Qty: 135 TABLET | Refills: 1 | Status: SHIPPED | OUTPATIENT
Start: 2020-04-21 | End: 2020-10-12

## 2020-04-21 NOTE — TELEPHONE ENCOUNTER
Patient is taking all of his medications but the warfarin. States has prescription cards for the other medicines, but the 2 rx for Warfarin is $20 each. Informed we will send in new rx for Warfarin for the 5mg and take 1.5 tabs daily to total his  7.

## 2020-04-23 ENCOUNTER — OFFICE VISIT (OUTPATIENT)
Dept: FAMILY MEDICINE CLINIC | Facility: CLINIC | Age: 65
End: 2020-04-23
Payer: COMMERCIAL

## 2020-04-23 VITALS
RESPIRATION RATE: 18 BRPM | TEMPERATURE: 97 F | SYSTOLIC BLOOD PRESSURE: 124 MMHG | DIASTOLIC BLOOD PRESSURE: 72 MMHG | HEART RATE: 77 BPM | BODY MASS INDEX: 36.1 KG/M2 | HEIGHT: 70.5 IN | OXYGEN SATURATION: 95 % | WEIGHT: 255 LBS

## 2020-04-23 DIAGNOSIS — E66.01 CLASS 2 SEVERE OBESITY DUE TO EXCESS CALORIES WITH SERIOUS COMORBIDITY AND BODY MASS INDEX (BMI) OF 35.0 TO 35.9 IN ADULT (HCC): ICD-10-CM

## 2020-04-23 DIAGNOSIS — F33.1 DEPRESSION, MAJOR, RECURRENT, MODERATE (HCC): ICD-10-CM

## 2020-04-23 DIAGNOSIS — Z79.01 LONG TERM (CURRENT) USE OF ANTICOAGULANTS: ICD-10-CM

## 2020-04-23 DIAGNOSIS — I48.21 PERMANENT ATRIAL FIBRILLATION (HCC): Primary | ICD-10-CM

## 2020-04-23 DIAGNOSIS — E11.9 CONTROLLED TYPE 2 DIABETES MELLITUS WITHOUT COMPLICATION, WITHOUT LONG-TERM CURRENT USE OF INSULIN (HCC): ICD-10-CM

## 2020-04-23 PROCEDURE — 83036 HEMOGLOBIN GLYCOSYLATED A1C: CPT | Performed by: FAMILY MEDICINE

## 2020-04-23 PROCEDURE — 99214 OFFICE O/P EST MOD 30 MIN: CPT | Performed by: FAMILY MEDICINE

## 2020-04-23 NOTE — PROGRESS NOTES
[facility] [place]  PROGRESS NOTE  Chief Complaint:   Patient presents with:  ER F/U: Javier Martinez, 4/13/20, severe headache right side of head      HPI:   This is a 63year old male coming in for follow-up on his emergency department visit.   He w History:   Procedure Laterality Date   • APPENDECTOMY     • BACK SURGERY     • COLONOSCOPY     • HAND ORTHOSIS, METACARPAL FRACTURE ORTHOSIS, PREFABRICATED, INCLUDES FITTING  2010   • KNEE SCOPE,MED/LAT MENISECTOMY  02/28/2007   • REPAIR ROTATOR CUFF,CHRON Tab Take 1 tablet (10 mg total) by mouth nightly. 90 tablet 1   • traZODone HCl 50 MG Oral Tab Take 1-2 tablets by mouth nightly as needed.   0   • Glucose Blood (ONETOUCH ULTRA BLUE) In Vitro Strip      • Insulin Pen Needle (BD PEN NEEDLE SHORT U/F) 31G X of this encounter: 70.5\". Weight as of this encounter: 255 lb (115.7 kg). Vital signs reviewed. Physical Exam:  GEN: He is awake and alert. He is smiling today. He was very cooperative.   HEENT:  Head:  Normocephalic, atraumatic Eyes: EOMI, PERRLA, in 4 to 5 days.   Plan on returning for a diabetes visit in July or August.      [facility] for this Visit:  Requested Prescriptions      No prescriptions requested or ordered in this encounter       Health Maintenance:  Diabetes Care A1C due on 03/13/2

## 2020-04-24 ENCOUNTER — TELEPHONE (OUTPATIENT)
Dept: FAMILY MEDICINE CLINIC | Facility: CLINIC | Age: 65
End: 2020-04-24

## 2020-04-24 NOTE — TELEPHONE ENCOUNTER
Patient informed of the below results and recommendations. Patient states he has been taking both medications daily since starting the [address]. Wondering if there is a different recommendation or just continue as has been? Please advise.

## 2020-04-24 NOTE — TELEPHONE ENCOUNTER
No changes recommended. Please take the Farxiga and the metformin faithfully every day. Please also be very cognizant of the diet. Regular exercise would also be helpful.

## 2020-04-24 NOTE — TELEPHONE ENCOUNTER
----- Message from Marina Jonas MD sent at 4/24/2020  8:09 AM CDT -----  Please call Jose Brewer. His hemoglobin A1c went up to 9.7. Plan: Please take the Farxiga and metformin every day. In the past, that has worked well to control the blood sugar.   Yovanny moreau

## 2020-04-27 RX ORDER — ROSUVASTATIN CALCIUM 10 MG/1
TABLET, COATED ORAL
Qty: 90 TABLET | Refills: 1 | Status: SHIPPED | OUTPATIENT
Start: 2020-04-27 | End: 2020-11-09

## 2020-04-27 NOTE — TELEPHONE ENCOUNTER
Future appt:     Your appointments     Date & Time Appointment Department [facility])    Jul 20, 2020  8:15 AM CDT Laboratory Visit with REF [facility] ([facility] Ref [facility])        Jul 24, 2020  8:00 AM CDT Follow Up Visit with Portia Victor

## 2020-05-11 RX ORDER — DILTIAZEM HYDROCHLORIDE 180 MG/1
CAPSULE, COATED, EXTENDED RELEASE ORAL
Qty: 90 CAPSULE | Refills: 1 | Status: SHIPPED | OUTPATIENT
Start: 2020-05-11 | End: 2020-11-13

## 2020-05-11 RX ORDER — DAPAGLIFLOZIN 10 MG/1
TABLET, FILM COATED ORAL
Qty: 90 TABLET | Refills: 1 | Status: SHIPPED | OUTPATIENT
Start: 2020-05-11 | End: 2020-11-13

## 2020-05-18 ENCOUNTER — TELEPHONE (OUTPATIENT)
Dept: FAMILY MEDICINE CLINIC | Facility: CLINIC | Age: 65
End: 2020-05-18

## 2020-05-18 NOTE — TELEPHONE ENCOUNTER
Patient was to resume his coumadin back during the week of 4/23/20 ( see office visit with Dr. Madelin Morris. He does not have an INR appointment scheduled. States he has been taking his coumadin now again for a little over one week.  Appt for INR scheduled

## 2020-05-19 ENCOUNTER — ANTI-COAG VISIT (OUTPATIENT)
Dept: FAMILY MEDICINE CLINIC | Facility: CLINIC | Age: 65
End: 2020-05-19
Payer: COMMERCIAL

## 2020-05-19 VITALS
HEIGHT: 70.5 IN | TEMPERATURE: 98 F | OXYGEN SATURATION: 99 % | WEIGHT: 252.19 LBS | BODY MASS INDEX: 35.7 KG/M2 | HEART RATE: 61 BPM | SYSTOLIC BLOOD PRESSURE: 138 MMHG | DIASTOLIC BLOOD PRESSURE: 88 MMHG

## 2020-05-19 DIAGNOSIS — Z79.01 LONG TERM (CURRENT) USE OF ANTICOAGULANTS: ICD-10-CM

## 2020-05-19 DIAGNOSIS — I48.21 PERMANENT ATRIAL FIBRILLATION (HCC): ICD-10-CM

## 2020-05-19 PROCEDURE — 3079F DIAST BP 80-89 MM HG: CPT | Performed by: FAMILY MEDICINE

## 2020-05-19 PROCEDURE — 93793 ANTICOAG MGMT PT WARFARIN: CPT | Performed by: FAMILY MEDICINE

## 2020-05-19 PROCEDURE — 3075F SYST BP GE 130 - 139MM HG: CPT | Performed by: FAMILY MEDICINE

## 2020-05-19 PROCEDURE — 85610 PROTHROMBIN TIME: CPT | Performed by: FAMILY MEDICINE

## 2020-05-19 PROCEDURE — 3008F BODY MASS INDEX DOCD: CPT | Performed by: FAMILY MEDICINE

## 2020-05-19 NOTE — PROGRESS NOTES
Here for INR check in coumadin clinic in the office. CURRENT COUMADIN DOSE:  7.5mg daily     CHANGES OR COMPLAINTS:  Resumed coumadin one and a half weeks ago.    ( stopped due to being unable to afford medication)     INR RESULTS TODAY:  2.0 ( in goal)

## 2020-05-22 ENCOUNTER — TELEPHONE (OUTPATIENT)
Dept: FAMILY MEDICINE CLINIC | Facility: CLINIC | Age: 65
End: 2020-05-22

## 2020-05-26 NOTE — TELEPHONE ENCOUNTER
I called and spoke with Lionel Alvarado about the incident in the office on May 19. Lionel Alvarado had a very different perspective than the staff about the incident.   From his perspective there was no disrespect intended and there was not anything inappropriate about his con

## 2020-06-08 ENCOUNTER — ANTI-COAG VISIT (OUTPATIENT)
Dept: FAMILY MEDICINE CLINIC | Facility: CLINIC | Age: 65
End: 2020-06-08
Payer: COMMERCIAL

## 2020-06-08 VITALS — TEMPERATURE: 97 F | DIASTOLIC BLOOD PRESSURE: 70 MMHG | SYSTOLIC BLOOD PRESSURE: 150 MMHG | HEART RATE: 58 BPM

## 2020-06-08 DIAGNOSIS — Z79.01 LONG TERM (CURRENT) USE OF ANTICOAGULANTS: ICD-10-CM

## 2020-06-08 DIAGNOSIS — I48.21 PERMANENT ATRIAL FIBRILLATION (HCC): ICD-10-CM

## 2020-06-08 PROCEDURE — 93793 ANTICOAG MGMT PT WARFARIN: CPT | Performed by: FAMILY MEDICINE

## 2020-06-08 PROCEDURE — 85610 PROTHROMBIN TIME: CPT | Performed by: FAMILY MEDICINE

## 2020-06-08 NOTE — PATIENT INSTRUCTIONS
INR a little low due to missed coumadin dose. Take coumadin 10mg today. Tomorrow resume 7.5mg every day. Next INR in 2 weeks.

## 2020-06-08 NOTE — PROGRESS NOTES
Here for INR check in coumadin clinic in the office.     CURRENT COUMADIN DOSE:  7.5mg daily     CHANGES OR COMPLAINTS:  Missed dosage    INR RESULTS TODAY:  1.7 ( below goal)    PLAN:  Increase coumadin today to 10mg  Tomorrow resume regular coumadin dose

## 2020-06-22 ENCOUNTER — ANTI-COAG VISIT (OUTPATIENT)
Dept: FAMILY MEDICINE CLINIC | Facility: CLINIC | Age: 65
End: 2020-06-22
Payer: COMMERCIAL

## 2020-06-22 DIAGNOSIS — Z79.01 LONG TERM (CURRENT) USE OF ANTICOAGULANTS: ICD-10-CM

## 2020-06-22 DIAGNOSIS — I48.21 PERMANENT ATRIAL FIBRILLATION (HCC): ICD-10-CM

## 2020-06-22 PROCEDURE — 85610 PROTHROMBIN TIME: CPT | Performed by: FAMILY MEDICINE

## 2020-06-22 PROCEDURE — 93793 ANTICOAG MGMT PT WARFARIN: CPT | Performed by: FAMILY MEDICINE

## 2020-06-22 NOTE — PATIENT INSTRUCTIONS
INR still low. Increase coumadin to 10mg every Monday and 7.5mg daily the rest of the week   Next INR in 2 weeks.

## 2020-06-24 VITALS
OXYGEN SATURATION: 98 % | HEART RATE: 74 BPM | BODY MASS INDEX: 36.19 KG/M2 | TEMPERATURE: 98 F | HEIGHT: 70.5 IN | WEIGHT: 255.63 LBS

## 2020-06-24 NOTE — PROGRESS NOTES
Here for INR check in coumadin clinic in the office. CURRENT COUMADIN DOSE:  7.5mg daily    CHANGES OR COMPLAINTS:  No changes   Denies bleeding problems, medication changes, health changes, ER visit or admission, diet change, or change in alcohol use.

## 2020-07-06 ENCOUNTER — ANTI-COAG VISIT (OUTPATIENT)
Dept: FAMILY MEDICINE CLINIC | Facility: CLINIC | Age: 65
End: 2020-07-06
Payer: COMMERCIAL

## 2020-07-06 VITALS — TEMPERATURE: 97 F

## 2020-07-06 DIAGNOSIS — I48.21 PERMANENT ATRIAL FIBRILLATION (HCC): ICD-10-CM

## 2020-07-06 DIAGNOSIS — Z79.01 LONG TERM (CURRENT) USE OF ANTICOAGULANTS: ICD-10-CM

## 2020-07-06 LAB — INR: 1.3 (ref 0.8–1.2)

## 2020-07-06 PROCEDURE — 93793 ANTICOAG MGMT PT WARFARIN: CPT | Performed by: FAMILY MEDICINE

## 2020-07-06 PROCEDURE — 85610 PROTHROMBIN TIME: CPT | Performed by: FAMILY MEDICINE

## 2020-07-06 NOTE — PROGRESS NOTES
Here for INR check in coumadin clinic in the office.     CURRENT COUMADIN DOSE:  10mg M and 7.5mg daily the rest of the week     CHANGES OR COMPLAINTS:  Missed doses    INR RESULTS TODAY:  1.3 ( below goal)    PLAN:  Increase coumadin to 10mg Mon and Tues a

## 2020-07-13 ENCOUNTER — ANTI-COAG VISIT (OUTPATIENT)
Dept: FAMILY MEDICINE CLINIC | Facility: CLINIC | Age: 65
End: 2020-07-13
Payer: COMMERCIAL

## 2020-07-13 VITALS — WEIGHT: 252.81 LBS | BODY MASS INDEX: 36 KG/M2 | TEMPERATURE: 97 F

## 2020-07-13 DIAGNOSIS — I48.21 PERMANENT ATRIAL FIBRILLATION (HCC): ICD-10-CM

## 2020-07-13 DIAGNOSIS — Z79.01 LONG TERM (CURRENT) USE OF ANTICOAGULANTS: ICD-10-CM

## 2020-07-13 LAB — INR: 1.8 (ref 2–3)

## 2020-07-13 PROCEDURE — 85610 PROTHROMBIN TIME: CPT | Performed by: FAMILY MEDICINE

## 2020-07-13 NOTE — PATIENT INSTRUCTIONS
Progress Notes   Ann Fontenot MD (Physician) • • Family Medicine   Unsigned      Please take 10 mg every Monday and 10 mg every Thursday. Take 7.5 mg other days. Recheck INR again in 1 week.

## 2020-07-13 NOTE — PROGRESS NOTES
Please take 10 mg every Monday and 10 mg every Thursday. Take 7.5 mg other days. Recheck INR again in 1 week.

## 2020-07-17 ENCOUNTER — TELEPHONE (OUTPATIENT)
Dept: FAMILY MEDICINE CLINIC | Facility: CLINIC | Age: 65
End: 2020-07-17

## 2020-07-17 DIAGNOSIS — E11.9 CONTROLLED TYPE 2 DIABETES MELLITUS WITHOUT COMPLICATION, WITHOUT LONG-TERM CURRENT USE OF INSULIN (HCC): Primary | ICD-10-CM

## 2020-07-17 NOTE — TELEPHONE ENCOUNTER
----- Message from Maritza Sofia sent at 7/17/2020  8:27 AM CDT -----  Regarding: lab orders needed  Please place lab orders      Thanks,  [employer]

## 2020-07-20 ENCOUNTER — APPOINTMENT (OUTPATIENT)
Dept: LAB | Age: 65
End: 2020-07-20
Attending: FAMILY MEDICINE
Payer: COMMERCIAL

## 2020-07-20 ENCOUNTER — ANTI-COAG VISIT (OUTPATIENT)
Dept: FAMILY MEDICINE CLINIC | Facility: CLINIC | Age: 65
End: 2020-07-20
Payer: COMMERCIAL

## 2020-07-20 VITALS
HEART RATE: 73 BPM | DIASTOLIC BLOOD PRESSURE: 80 MMHG | SYSTOLIC BLOOD PRESSURE: 122 MMHG | TEMPERATURE: 97 F | RESPIRATION RATE: 12 BRPM | OXYGEN SATURATION: 99 %

## 2020-07-20 DIAGNOSIS — Z79.01 LONG TERM (CURRENT) USE OF ANTICOAGULANTS: ICD-10-CM

## 2020-07-20 DIAGNOSIS — I48.21 PERMANENT ATRIAL FIBRILLATION (HCC): ICD-10-CM

## 2020-07-20 DIAGNOSIS — E11.9 CONTROLLED TYPE 2 DIABETES MELLITUS WITHOUT COMPLICATION, WITHOUT LONG-TERM CURRENT USE OF INSULIN (HCC): ICD-10-CM

## 2020-07-20 LAB
ALBUMIN SERPL-MCNC: 3.5 G/DL (ref 3.4–5)
ALBUMIN/GLOB SERPL: 0.8 {RATIO} (ref 1–2)
ALP LIVER SERPL-CCNC: 119 U/L (ref 45–117)
ALT SERPL-CCNC: 39 U/L (ref 16–61)
ANION GAP SERPL CALC-SCNC: 10 MMOL/L (ref 0–18)
AST SERPL-CCNC: 19 U/L (ref 15–37)
BILIRUB SERPL-MCNC: 0.5 MG/DL (ref 0.1–2)
BUN BLD-MCNC: 24 MG/DL (ref 7–18)
BUN/CREAT SERPL: 25.3 (ref 10–20)
CALCIUM BLD-MCNC: 8.7 MG/DL (ref 8.5–10.1)
CHLORIDE SERPL-SCNC: 102 MMOL/L (ref 98–112)
CO2 SERPL-SCNC: 24 MMOL/L (ref 21–32)
CREAT BLD-MCNC: 0.95 MG/DL (ref 0.7–1.3)
EST. AVERAGE GLUCOSE BLD GHB EST-MCNC: 255 MG/DL (ref 68–126)
GLOBULIN PLAS-MCNC: 4.2 G/DL (ref 2.8–4.4)
GLUCOSE BLD-MCNC: 278 MG/DL (ref 70–99)
HBA1C MFR BLD HPLC: 10.5 % (ref ?–5.7)
INR: 1.3 (ref 0.8–1.2)
M PROTEIN MFR SERPL ELPH: 7.7 G/DL (ref 6.4–8.2)
OSMOLALITY SERPL CALC.SUM OF ELEC: 296 MOSM/KG (ref 275–295)
PATIENT FASTING Y/N/NP: YES
POTASSIUM SERPL-SCNC: 3.8 MMOL/L (ref 3.5–5.1)
SODIUM SERPL-SCNC: 136 MMOL/L (ref 136–145)

## 2020-07-20 PROCEDURE — 93793 ANTICOAG MGMT PT WARFARIN: CPT | Performed by: FAMILY MEDICINE

## 2020-07-20 PROCEDURE — 3079F DIAST BP 80-89 MM HG: CPT | Performed by: FAMILY MEDICINE

## 2020-07-20 PROCEDURE — 3074F SYST BP LT 130 MM HG: CPT | Performed by: FAMILY MEDICINE

## 2020-07-20 PROCEDURE — 83036 HEMOGLOBIN GLYCOSYLATED A1C: CPT | Performed by: FAMILY MEDICINE

## 2020-07-20 PROCEDURE — 36415 COLL VENOUS BLD VENIPUNCTURE: CPT | Performed by: FAMILY MEDICINE

## 2020-07-20 PROCEDURE — 80053 COMPREHEN METABOLIC PANEL: CPT | Performed by: FAMILY MEDICINE

## 2020-07-20 PROCEDURE — 85610 PROTHROMBIN TIME: CPT | Performed by: FAMILY MEDICINE

## 2020-07-20 NOTE — PROGRESS NOTES
Here for INR check in coumadin clinic in the office.     CURRENT COUMADIN DOSE:  10mg M,Th and 7.5mg daily the rest of the week    CHANGES OR COMPLAINTS:  Missed dose on Saturday  Took extra dose on Sunday-10mg instead of 7.5mg    INR RESULTS TODAY:  1.3 (

## 2020-07-24 ENCOUNTER — OFFICE VISIT (OUTPATIENT)
Dept: FAMILY MEDICINE CLINIC | Facility: CLINIC | Age: 65
End: 2020-07-24
Payer: COMMERCIAL

## 2020-07-24 VITALS
TEMPERATURE: 97 F | WEIGHT: 252 LBS | OXYGEN SATURATION: 95 % | BODY MASS INDEX: 36.08 KG/M2 | SYSTOLIC BLOOD PRESSURE: 110 MMHG | HEART RATE: 64 BPM | DIASTOLIC BLOOD PRESSURE: 76 MMHG | RESPIRATION RATE: 22 BRPM | HEIGHT: 70 IN

## 2020-07-24 DIAGNOSIS — E78.49 FAMILIAL MULTIPLE LIPOPROTEIN-TYPE HYPERLIPIDEMIA: ICD-10-CM

## 2020-07-24 DIAGNOSIS — F33.1 DEPRESSION, MAJOR, RECURRENT, MODERATE (HCC): ICD-10-CM

## 2020-07-24 DIAGNOSIS — K21.00 GASTROESOPHAGEAL REFLUX DISEASE WITH ESOPHAGITIS: ICD-10-CM

## 2020-07-24 DIAGNOSIS — M25.512 PAIN IN JOINT OF LEFT SHOULDER: ICD-10-CM

## 2020-07-24 DIAGNOSIS — I10 BENIGN ESSENTIAL HYPERTENSION: ICD-10-CM

## 2020-07-24 DIAGNOSIS — E11.9 CONTROLLED TYPE 2 DIABETES MELLITUS WITHOUT COMPLICATION, WITHOUT LONG-TERM CURRENT USE OF INSULIN (HCC): Primary | ICD-10-CM

## 2020-07-24 DIAGNOSIS — Z79.01 LONG TERM (CURRENT) USE OF ANTICOAGULANTS: ICD-10-CM

## 2020-07-24 DIAGNOSIS — I25.10 CORONARY ARTERY DISEASE INVOLVING NATIVE CORONARY ARTERY OF NATIVE HEART WITHOUT ANGINA PECTORIS: ICD-10-CM

## 2020-07-24 DIAGNOSIS — I48.21 PERMANENT ATRIAL FIBRILLATION (HCC): ICD-10-CM

## 2020-07-24 PROCEDURE — 3078F DIAST BP <80 MM HG: CPT | Performed by: FAMILY MEDICINE

## 2020-07-24 PROCEDURE — 3074F SYST BP LT 130 MM HG: CPT | Performed by: FAMILY MEDICINE

## 2020-07-24 PROCEDURE — 99214 OFFICE O/P EST MOD 30 MIN: CPT | Performed by: FAMILY MEDICINE

## 2020-07-24 PROCEDURE — 3008F BODY MASS INDEX DOCD: CPT | Performed by: FAMILY MEDICINE

## 2020-07-24 NOTE — PROGRESS NOTES
[address]  PROGRESS NOTE  Chief Complaint:   Patient presents with: Follow - Up      HPI:   This is a 63year old male coming in for follow-up on his diabetes. He is aware that his blood sugars have been up.   He has thought long and h diarrhea, nausea  Dulaglutide                 Comment:Other reaction(s): achiness  Pantoprazole            RASH  Contrast  [Radiolog*      Metrizamide             ITCHING  Penicillins               Tramadol Hcl              Victoza                 DIARRHEA throat. INTEGUMENTARY:  Denies rashes, itching, skin lesion, or excessive skin dryness.   CARDIOVASCULAR:  Denies chest pain, chest pressure, chest discomfort, palpitations, edema, dyspnea on exertion or at rest.  RESPIRATORY:  Denies shortness of breath, with no murmur. LUNGS: Clear to auscultation bilterally, no rales/rhonchi/wheezing. ABDOMEN:  Soft, nondistended, nontender, bowel sounds normal in all 4 quadrants, no masses, no hepatosplenomegaly.   BACK: No tenderness, no spasm, SLR test negative, FROM Inject 10 Units into the skin 2 (two) times a day. • Insulin Pen Needle (BD PEN NEEDLE THOMPSON U/F) 32G X 4 MM Does not apply Misc 200 each 3     Sig: Inject 1 Dose into the skin 2 (two) times a day.        Health Maintenance:  Annual Physical due on 03/16/2

## 2020-07-27 ENCOUNTER — ANTI-COAG VISIT (OUTPATIENT)
Dept: FAMILY MEDICINE CLINIC | Facility: CLINIC | Age: 65
End: 2020-07-27
Payer: COMMERCIAL

## 2020-07-27 VITALS — TEMPERATURE: 97 F

## 2020-07-27 DIAGNOSIS — I48.21 PERMANENT ATRIAL FIBRILLATION (HCC): ICD-10-CM

## 2020-07-27 DIAGNOSIS — Z79.01 LONG TERM (CURRENT) USE OF ANTICOAGULANTS: ICD-10-CM

## 2020-07-27 LAB — INR: 1.7 (ref 0.8–1.2)

## 2020-07-27 PROCEDURE — 93793 ANTICOAG MGMT PT WARFARIN: CPT | Performed by: FAMILY MEDICINE

## 2020-07-27 PROCEDURE — 85610 PROTHROMBIN TIME: CPT | Performed by: FAMILY MEDICINE

## 2020-07-27 RX ORDER — OMEPRAZOLE 20 MG/1
20 CAPSULE, DELAYED RELEASE ORAL
Qty: 90 CAPSULE | Refills: 1 | Status: SHIPPED | OUTPATIENT
Start: 2020-07-27 | End: 2021-02-02

## 2020-07-27 RX ORDER — LISINOPRIL 10 MG/1
TABLET ORAL
Qty: 90 TABLET | Refills: 1 | Status: SHIPPED | OUTPATIENT
Start: 2020-07-27 | End: 2021-02-02

## 2020-07-27 NOTE — TELEPHONE ENCOUNTER
Future appt:     Your appointments     Date & Time Appointment Department [facility])    Jul 27, 2020  9:00 AM CDT Anticoagulation Visit with RACHEAL [address], [place] ([place] Gomez)        Oct 19, 2020  8

## 2020-07-27 NOTE — PROGRESS NOTES
Here for INR check in coumadin clinic in the office.     CURRENT COUMADIN DOSE:  10mg M,Th and 7.5mg daily the rest of the week     CHANGES OR COMPLAINTS:  No changes    INR RESULTS TODAY:  1.7 ( in goal)    PLAN:  CPM coumadin   Next INR in one week   Appo

## 2020-07-31 ENCOUNTER — TELEPHONE (OUTPATIENT)
Dept: FAMILY MEDICINE CLINIC | Facility: CLINIC | Age: 65
End: 2020-07-31

## 2020-07-31 NOTE — TELEPHONE ENCOUNTER
Patient states that he needs a refill on his test strips to [facility] pharmacy in [place].

## 2020-07-31 NOTE — TELEPHONE ENCOUNTER
Future appt:     Your appointments     Date & Time Appointment Department [facility])    Aug 03, 2020  8:30 AM CDT Anticoagulation Visit with RACHEAL [address], [place] (Austin Gomez)        Oct 19, 2020  8

## 2020-07-31 NOTE — TELEPHONE ENCOUNTER
Patient is still using a OneTouch Glucometer-  Verio. Patient informed that Yimi Rabago from [facility] called in glucometer supplies yesterday. Advised to call back if Rx not at [facility]/agreed.   Franci Degroot, 07/31/20, 9:48 AM

## 2020-08-03 ENCOUNTER — TELEPHONE (OUTPATIENT)
Dept: FAMILY MEDICINE CLINIC | Facility: CLINIC | Age: 65
End: 2020-08-03

## 2020-08-03 ENCOUNTER — ANTI-COAG VISIT (OUTPATIENT)
Dept: FAMILY MEDICINE CLINIC | Facility: CLINIC | Age: 65
End: 2020-08-03
Payer: COMMERCIAL

## 2020-08-03 DIAGNOSIS — Z79.01 LONG TERM (CURRENT) USE OF ANTICOAGULANTS: ICD-10-CM

## 2020-08-03 DIAGNOSIS — I48.21 PERMANENT ATRIAL FIBRILLATION (HCC): ICD-10-CM

## 2020-08-03 LAB — INR: 2.1 (ref 0.8–1.2)

## 2020-08-03 PROCEDURE — 85610 PROTHROMBIN TIME: CPT | Performed by: FAMILY MEDICINE

## 2020-08-03 PROCEDURE — 93793 ANTICOAG MGMT PT WARFARIN: CPT | Performed by: FAMILY MEDICINE

## 2020-08-03 RX ORDER — BLOOD-GLUCOSE METER
EACH MISCELLANEOUS
COMMUNITY
End: 2020-08-03

## 2020-08-03 RX ORDER — BLOOD-GLUCOSE METER
EACH MISCELLANEOUS
Qty: 1 KIT | Refills: 0 | Status: SHIPPED | OUTPATIENT
Start: 2020-08-03

## 2020-08-03 NOTE — PROGRESS NOTES
Here for INR check in coumadin clinic in the office.     CURRENT COUMADIN DOSE:  10mg M,Th and 7.5mg daily the rest of the week     CHANGES OR COMPLAINTS:  No changes    INR RESULTS TODAY:  2.1 ( in goal)    PLAN:  CPM coumadin   Next INR due in 2 weeks  Ap

## 2020-08-03 NOTE — TELEPHONE ENCOUNTER
----- Message from Britton Figueroa sent at 8/3/2020 11:07 AM CDT -----  [facility], patient said to go ahead and send the order in

## 2020-08-03 NOTE — TELEPHONE ENCOUNTER
Future appt:     Your appointments     Date & Time Appointment Department [facility])    Aug 18, 2020  8:30 AM CDT Anticoagulation Visit with RACHEAL [address], [place] (Austin Gomez)        Oct 19, 2020  8

## 2020-08-18 ENCOUNTER — ANTI-COAG VISIT (OUTPATIENT)
Dept: FAMILY MEDICINE CLINIC | Facility: CLINIC | Age: 65
End: 2020-08-18
Payer: COMMERCIAL

## 2020-08-18 DIAGNOSIS — I48.21 PERMANENT ATRIAL FIBRILLATION (HCC): ICD-10-CM

## 2020-08-18 DIAGNOSIS — Z79.01 LONG TERM (CURRENT) USE OF ANTICOAGULANTS: ICD-10-CM

## 2020-08-18 LAB — INR: 2.1 (ref 0.8–1.2)

## 2020-08-18 PROCEDURE — 85610 PROTHROMBIN TIME: CPT | Performed by: FAMILY MEDICINE

## 2020-08-18 PROCEDURE — 93793 ANTICOAG MGMT PT WARFARIN: CPT | Performed by: FAMILY MEDICINE

## 2020-08-18 NOTE — PROGRESS NOTES
Here for INR check in coumadin clinic in the office.     CURRENT COUMADIN DOSE:  10mg M,Th and 7.5mg daily the rest of the week    CHANGES OR COMPLAINTS:  No changes    INR RESULTS TODAY:  2.1 ( in goal)    PLAN:  CPM coumadin   Next INR due in one month  A

## 2020-09-04 RX ORDER — SERTRALINE HYDROCHLORIDE 100 MG/1
TABLET, FILM COATED ORAL
Qty: 90 TABLET | Refills: 1 | Status: SHIPPED | OUTPATIENT
Start: 2020-09-04 | End: 2021-03-08

## 2020-09-04 NOTE — TELEPHONE ENCOUNTER
Future appt:     Your appointments     Date & Time Appointment Department [facility])    Sep 22, 2020  8:30 AM CDT Anticoagulation Visit with RACHEAL [address], [place] (Austin Gomez)        Oct 19, 2020  8

## 2020-09-15 NOTE — TELEPHONE ENCOUNTER
Future appt:     Your appointments     Date & Time Appointment Department [facility])    Sep 22, 2020  8:30 AM CDT Anticoagulation Visit with RACHEAL [address], [place] (Austin Gomez)        Oct 19, 2020  8

## 2020-09-15 NOTE — TELEPHONE ENCOUNTER
Patients ashley called, states that pt needs a refill on his Metformin 50mg today to [address] [place] [address] in [place] since pt is completely out

## 2020-09-22 ENCOUNTER — ANTI-COAG VISIT (OUTPATIENT)
Dept: FAMILY MEDICINE CLINIC | Facility: CLINIC | Age: 65
End: 2020-09-22
Payer: COMMERCIAL

## 2020-09-22 VITALS — TEMPERATURE: 97 F

## 2020-09-22 DIAGNOSIS — I48.21 PERMANENT ATRIAL FIBRILLATION (HCC): ICD-10-CM

## 2020-09-22 DIAGNOSIS — Z23 INFLUENZA VACCINE NEEDED: Primary | ICD-10-CM

## 2020-09-22 DIAGNOSIS — Z79.01 LONG TERM (CURRENT) USE OF ANTICOAGULANTS: ICD-10-CM

## 2020-09-22 LAB — INR: 2.2 (ref 0.8–1.2)

## 2020-09-22 PROCEDURE — 90471 IMMUNIZATION ADMIN: CPT | Performed by: FAMILY MEDICINE

## 2020-09-22 PROCEDURE — 90686 IIV4 VACC NO PRSV 0.5 ML IM: CPT | Performed by: FAMILY MEDICINE

## 2020-09-22 PROCEDURE — 85610 PROTHROMBIN TIME: CPT | Performed by: FAMILY MEDICINE

## 2020-09-22 NOTE — PROGRESS NOTES
Here for INR check in coumadin clinic in the office.     CURRENT COUMADIN DOSE:  10mg M,Th and 7.5mg daily the rest of the week    CHANGES OR COMPLAINTS:  No changes    INR RESULTS TODAY:  2.2 ( in goal)    PLAN:  CPM coumadin   Next INR in one month with l

## 2020-10-12 RX ORDER — WARFARIN SODIUM 5 MG/1
7.5 TABLET ORAL DAILY
Qty: 135 TABLET | Refills: 1 | Status: SHIPPED | OUTPATIENT
Start: 2020-10-12 | End: 2021-03-08

## 2020-10-12 NOTE — TELEPHONE ENCOUNTER
Future appt:     Your appointments     Date & Time Appointment Department [facility])    Oct 19, 2020  9:00 AM CDT Anticoagulation Visit with RACHEAL [address], [place] ([place])        Nov 23, 2020  9

## 2020-10-19 ENCOUNTER — ANTI-COAG VISIT (OUTPATIENT)
Dept: FAMILY MEDICINE CLINIC | Facility: CLINIC | Age: 65
End: 2020-10-19
Payer: COMMERCIAL

## 2020-10-19 VITALS — TEMPERATURE: 97 F

## 2020-10-19 DIAGNOSIS — I48.21 PERMANENT ATRIAL FIBRILLATION (HCC): ICD-10-CM

## 2020-10-19 DIAGNOSIS — Z79.01 LONG TERM (CURRENT) USE OF ANTICOAGULANTS: ICD-10-CM

## 2020-10-19 PROCEDURE — 93793 ANTICOAG MGMT PT WARFARIN: CPT | Performed by: FAMILY MEDICINE

## 2020-10-19 PROCEDURE — 85610 PROTHROMBIN TIME: CPT | Performed by: FAMILY MEDICINE

## 2020-10-19 NOTE — PROGRESS NOTES
Here for INR check in coumadin clinic in the office.     CURRENT COUMADIN DOSE:  10mg M, Th and 7.5mg daily the rest of the week     CHANGES OR COMPLAINTS:  Missed dose for 2 days, took extra 5mg for 2 days    INR RESULTS TODAY:  1.5    PLAN:  Increase coum

## 2020-10-26 ENCOUNTER — ANTI-COAG VISIT (OUTPATIENT)
Dept: FAMILY MEDICINE CLINIC | Facility: CLINIC | Age: 65
End: 2020-10-26
Payer: COMMERCIAL

## 2020-10-26 VITALS — TEMPERATURE: 96 F

## 2020-10-26 DIAGNOSIS — Z79.01 LONG TERM (CURRENT) USE OF ANTICOAGULANTS: ICD-10-CM

## 2020-10-26 DIAGNOSIS — I48.21 PERMANENT ATRIAL FIBRILLATION (HCC): ICD-10-CM

## 2020-10-26 PROCEDURE — 93793 ANTICOAG MGMT PT WARFARIN: CPT | Performed by: FAMILY MEDICINE

## 2020-10-26 PROCEDURE — 85610 PROTHROMBIN TIME: CPT | Performed by: FAMILY MEDICINE

## 2020-10-26 NOTE — PROGRESS NOTES
Here for INR check in coumadin clinic in the office.     CURRENT COUMADIN DOSE:  10mg M,Tu, Th and 7.5mg daily the rest of the week     CHANGES OR COMPLAINTS:  Dayquil    INR RESULTS TODAY:  1.6 ( still below goal)    PLAN:  Coumadin 10mg M,Tu, Wed, Th and

## 2020-11-02 ENCOUNTER — ANTI-COAG VISIT (OUTPATIENT)
Dept: FAMILY MEDICINE CLINIC | Facility: CLINIC | Age: 65
End: 2020-11-02
Payer: COMMERCIAL

## 2020-11-02 VITALS — TEMPERATURE: 98 F

## 2020-11-02 DIAGNOSIS — Z79.01 LONG TERM (CURRENT) USE OF ANTICOAGULANTS: ICD-10-CM

## 2020-11-02 DIAGNOSIS — I48.21 PERMANENT ATRIAL FIBRILLATION (HCC): ICD-10-CM

## 2020-11-02 PROCEDURE — 85610 PROTHROMBIN TIME: CPT | Performed by: FAMILY MEDICINE

## 2020-11-02 PROCEDURE — 93793 ANTICOAG MGMT PT WARFARIN: CPT | Performed by: FAMILY MEDICINE

## 2020-11-02 NOTE — PROGRESS NOTES
Here for INR check in coumadin clinic in the office.     CURRENT COUMADIN DOSE:  10mg daily for 4 days, then resume 10mg M, Th and 7.5mg daily the rest of the week     CHANGES OR COMPLAINTS:  Missed dose, then took extra dose    INR RESULTS TODAY:  1.9 ( ri

## 2020-11-09 ENCOUNTER — ANTI-COAG VISIT (OUTPATIENT)
Dept: FAMILY MEDICINE CLINIC | Facility: CLINIC | Age: 65
End: 2020-11-09
Payer: COMMERCIAL

## 2020-11-09 DIAGNOSIS — Z79.01 LONG TERM (CURRENT) USE OF ANTICOAGULANTS: ICD-10-CM

## 2020-11-09 DIAGNOSIS — I48.21 PERMANENT ATRIAL FIBRILLATION (HCC): ICD-10-CM

## 2020-11-09 PROCEDURE — 93793 ANTICOAG MGMT PT WARFARIN: CPT | Performed by: FAMILY MEDICINE

## 2020-11-09 PROCEDURE — 85610 PROTHROMBIN TIME: CPT | Performed by: FAMILY MEDICINE

## 2020-11-09 RX ORDER — ROSUVASTATIN CALCIUM 10 MG/1
TABLET, COATED ORAL
Qty: 90 TABLET | Refills: 1 | Status: SHIPPED | OUTPATIENT
Start: 2020-11-09 | End: 2021-03-08

## 2020-11-09 NOTE — PROGRESS NOTES
Here for INR check in coumadin clinic in the office.     CURRENT COUMADIN DOSE:  10mg M,W,F and 7.5mg daily the rest of the week     CHANGES OR COMPLAINTS:  No changes    INR RESULTS TODAY:  2.2 ( in goal)    PLAN:  CPM 10mg M,W,F and 7.5mg daily the rest o

## 2020-11-09 NOTE — TELEPHONE ENCOUNTER
Rosuvastatin:  4/27/20    Future appt:     Your appointments     Date & Time Appointment Department [facility])    Nov 09, 2020 11:45 AM CST Anticoagulation Visit with EMG [address], [facility] ([address] [facility] [place]

## 2020-11-10 ENCOUNTER — TELEPHONE (OUTPATIENT)
Dept: FAMILY MEDICINE CLINIC | Facility: CLINIC | Age: 65
End: 2020-11-10

## 2020-11-13 RX ORDER — DAPAGLIFLOZIN 10 MG/1
TABLET, FILM COATED ORAL
Qty: 90 TABLET | Refills: 1 | Status: SHIPPED | OUTPATIENT
Start: 2020-11-13 | End: 2021-03-08

## 2020-11-13 RX ORDER — DILTIAZEM HYDROCHLORIDE 180 MG/1
CAPSULE, COATED, EXTENDED RELEASE ORAL
Qty: 90 CAPSULE | Refills: 1 | Status: SHIPPED | OUTPATIENT
Start: 2020-11-13 | End: 2021-03-08

## 2020-11-13 NOTE — TELEPHONE ENCOUNTER
Diltiazem: 5/11/20  Farxiga: 5/11/20    Future appt:     Your appointments     Date & Time Appointment Department [facility])    Nov 23, 2020  9:30 AM CST Laboratory Visit with REF [facility] ([facility] Ref Lab [facility])        Nov 27, 2020  9:30

## 2020-11-23 ENCOUNTER — LABORATORY ENCOUNTER (OUTPATIENT)
Dept: LAB | Age: 65
End: 2020-11-23
Attending: FAMILY MEDICINE
Payer: COMMERCIAL

## 2020-11-23 DIAGNOSIS — Z79.01 LONG TERM (CURRENT) USE OF ANTICOAGULANTS: ICD-10-CM

## 2020-11-23 PROCEDURE — 85610 PROTHROMBIN TIME: CPT | Performed by: FAMILY MEDICINE

## 2020-11-23 PROCEDURE — 36415 COLL VENOUS BLD VENIPUNCTURE: CPT | Performed by: FAMILY MEDICINE

## 2020-11-24 ENCOUNTER — ANTI-COAG VISIT (OUTPATIENT)
Dept: FAMILY MEDICINE CLINIC | Facility: CLINIC | Age: 65
End: 2020-11-24

## 2020-11-24 DIAGNOSIS — Z79.01 LONG TERM (CURRENT) USE OF ANTICOAGULANTS: ICD-10-CM

## 2020-11-24 DIAGNOSIS — I48.21 PERMANENT ATRIAL FIBRILLATION (HCC): ICD-10-CM

## 2020-11-24 PROCEDURE — 93793 ANTICOAG MGMT PT WARFARIN: CPT | Performed by: FAMILY MEDICINE

## 2020-11-24 NOTE — PROGRESS NOTES
Here for INR check in coumadin clinic in the office.     CURRENT COUMADIN DOSE:  10mg M,W,F and 7.5mg daily the rest of the week     CHANGES OR COMPLAINTS:  No changes    INR RESULTS TODAY:  2.08 ( in goal of 2-3)    PLAN:  CPM coumadin   Next INR in one mo

## 2020-11-27 ENCOUNTER — APPOINTMENT (OUTPATIENT)
Dept: LAB | Age: 65
End: 2020-11-27
Attending: FAMILY MEDICINE
Payer: COMMERCIAL

## 2020-11-27 ENCOUNTER — OFFICE VISIT (OUTPATIENT)
Dept: FAMILY MEDICINE CLINIC | Facility: CLINIC | Age: 65
End: 2020-11-27
Payer: COMMERCIAL

## 2020-11-27 VITALS
HEART RATE: 96 BPM | BODY MASS INDEX: 37.51 KG/M2 | RESPIRATION RATE: 20 BRPM | DIASTOLIC BLOOD PRESSURE: 60 MMHG | SYSTOLIC BLOOD PRESSURE: 128 MMHG | OXYGEN SATURATION: 96 % | WEIGHT: 262 LBS | HEIGHT: 70 IN | TEMPERATURE: 99 F

## 2020-11-27 DIAGNOSIS — F33.1 DEPRESSION, MAJOR, RECURRENT, MODERATE (HCC): ICD-10-CM

## 2020-11-27 DIAGNOSIS — Z79.4 CONTROLLED TYPE 2 DIABETES MELLITUS WITH STAGE 2 CHRONIC KIDNEY DISEASE, WITH LONG-TERM CURRENT USE OF INSULIN (HCC): Primary | ICD-10-CM

## 2020-11-27 DIAGNOSIS — I10 BENIGN ESSENTIAL HYPERTENSION: ICD-10-CM

## 2020-11-27 DIAGNOSIS — E11.22 CONTROLLED TYPE 2 DIABETES MELLITUS WITH STAGE 2 CHRONIC KIDNEY DISEASE, WITH LONG-TERM CURRENT USE OF INSULIN (HCC): Primary | ICD-10-CM

## 2020-11-27 DIAGNOSIS — Z79.01 LONG TERM (CURRENT) USE OF ANTICOAGULANTS: ICD-10-CM

## 2020-11-27 DIAGNOSIS — N18.2 CONTROLLED TYPE 2 DIABETES MELLITUS WITH STAGE 2 CHRONIC KIDNEY DISEASE, WITH LONG-TERM CURRENT USE OF INSULIN (HCC): Primary | ICD-10-CM

## 2020-11-27 DIAGNOSIS — E11.9 CONTROLLED TYPE 2 DIABETES MELLITUS WITHOUT COMPLICATION, WITHOUT LONG-TERM CURRENT USE OF INSULIN (HCC): ICD-10-CM

## 2020-11-27 DIAGNOSIS — I25.10 CORONARY ARTERY DISEASE INVOLVING NATIVE CORONARY ARTERY OF NATIVE HEART WITHOUT ANGINA PECTORIS: ICD-10-CM

## 2020-11-27 PROCEDURE — 3008F BODY MASS INDEX DOCD: CPT | Performed by: FAMILY MEDICINE

## 2020-11-27 PROCEDURE — 82570 ASSAY OF URINE CREATININE: CPT | Performed by: FAMILY MEDICINE

## 2020-11-27 PROCEDURE — 83721 ASSAY OF BLOOD LIPOPROTEIN: CPT | Performed by: FAMILY MEDICINE

## 2020-11-27 PROCEDURE — 3074F SYST BP LT 130 MM HG: CPT | Performed by: FAMILY MEDICINE

## 2020-11-27 PROCEDURE — 99214 OFFICE O/P EST MOD 30 MIN: CPT | Performed by: FAMILY MEDICINE

## 2020-11-27 PROCEDURE — 36415 COLL VENOUS BLD VENIPUNCTURE: CPT | Performed by: FAMILY MEDICINE

## 2020-11-27 PROCEDURE — 84153 ASSAY OF PSA TOTAL: CPT | Performed by: FAMILY MEDICINE

## 2020-11-27 PROCEDURE — 82043 UR ALBUMIN QUANTITATIVE: CPT | Performed by: FAMILY MEDICINE

## 2020-11-27 PROCEDURE — 83036 HEMOGLOBIN GLYCOSYLATED A1C: CPT | Performed by: FAMILY MEDICINE

## 2020-11-27 PROCEDURE — 80061 LIPID PANEL: CPT | Performed by: FAMILY MEDICINE

## 2020-11-27 PROCEDURE — 80050 GENERAL HEALTH PANEL: CPT | Performed by: FAMILY MEDICINE

## 2020-11-27 PROCEDURE — 3078F DIAST BP <80 MM HG: CPT | Performed by: FAMILY MEDICINE

## 2020-11-27 RX ORDER — HUMAN INSULIN 100 [IU]/ML
15 INJECTION, SUSPENSION SUBCUTANEOUS 2 TIMES DAILY
Qty: 12 PEN | Refills: 1 | Status: SHIPPED | OUTPATIENT
Start: 2020-11-27 | End: 2020-12-21

## 2020-11-27 NOTE — PROGRESS NOTES
[address]  PROGRESS NOTE  Chief Complaint:   Patient presents with: Follow - Up: labs      HPI:   This is a 63year old male coming in for follow-up. He has been taking NPH 70/30 insulin twice a day.   He is not having any problems wit [Radiolog*      Metrizamide             ITCHING  Penicillins               Tramadol Hcl              Victoza                 DIARRHEA  Current Meds:  Current Outpatient Medications   Medication Sig Dispense Refill   • Insulin NPH & Regular (NOVOLIN 70/30 F Denies unusual weight gain/loss, fever, chills, or fatigue. EENT:  Eyes:  Denies eye pain, visual loss, blurred vision, double vision or yellow sclerae. Ears, Nose, Throat:  Denies hearing loss, sneezing, congestion, runny nose or sore throat.   INTEGUMENT no CLAD, no JVD, no thyromegaly. SKIN: No rashes, no skin lesion, no bruising, good turgor. HEART:  Regular rate and rhythm, no murmurs, rubs or gallops. LUNGS: Clear to auscultation bilterally, no rales/rhonchi/wheezing.   ABDOMEN:  Soft, nondistended, artery of native heart without angina pectoris  He does have coronary artery disease without angina. 5. Depression, major, recurrent, moderate ([address]  He has longstanding depression that is stable.     6. Long term (current) use of anticoagulants [Z79.01] Depression, major, recurrent, moderate (HCC)     Hx of CABG     Coronary artery disease involving native coronary artery of native heart without angina pectoris      Ramon Maldonado MD  11/27/2020  9:56 AM

## 2020-12-01 ENCOUNTER — TELEPHONE (OUTPATIENT)
Dept: FAMILY MEDICINE CLINIC | Facility: CLINIC | Age: 65
End: 2020-12-01

## 2020-12-01 NOTE — TELEPHONE ENCOUNTER
Patient informed of below. Expressed understanding. Patient will call in a couple weeks with blood sugar readings.

## 2020-12-01 NOTE — TELEPHONE ENCOUNTER
----- Message from Gsielle Nevarez MD sent at 11/30/2020 12:53 PM CST -----  Hemoglobin A1c remains very elevated at 10.5. That is the same as it was back in July.   Urine microalbumin to creatinine ratio was normal so no signs of long-term damage to the

## 2020-12-21 ENCOUNTER — ANTI-COAG VISIT (OUTPATIENT)
Dept: FAMILY MEDICINE CLINIC | Facility: CLINIC | Age: 65
End: 2020-12-21
Payer: COMMERCIAL

## 2020-12-21 ENCOUNTER — TELEPHONE (OUTPATIENT)
Dept: FAMILY MEDICINE CLINIC | Facility: CLINIC | Age: 65
End: 2020-12-21

## 2020-12-21 DIAGNOSIS — Z79.01 LONG TERM (CURRENT) USE OF ANTICOAGULANTS: ICD-10-CM

## 2020-12-21 DIAGNOSIS — I48.21 PERMANENT ATRIAL FIBRILLATION (HCC): ICD-10-CM

## 2020-12-21 PROCEDURE — 93793 ANTICOAG MGMT PT WARFARIN: CPT | Performed by: FAMILY MEDICINE

## 2020-12-21 PROCEDURE — 85610 PROTHROMBIN TIME: CPT | Performed by: FAMILY MEDICINE

## 2020-12-21 RX ORDER — HUMAN INSULIN 100 [IU]/ML
20 INJECTION, SUSPENSION SUBCUTANEOUS 2 TIMES DAILY
Qty: 12 PEN | Refills: 1 | COMMUNITY
Start: 2020-12-21 | End: 2021-01-11

## 2020-12-21 NOTE — PROGRESS NOTES
Here for INR check in coumadin clinic in the office.     CURRENT COUMADIN DOSE:  10mg M,W,F and 7.5mg daily the rest of the week     CHANGES OR COMPLAINTS:  No changes    INR RESULTS TODAY:  2.9 ( in goal)    PLAN:  Today take coumadin 7.5mg ( Monday)   Nena Terry

## 2021-01-11 RX ORDER — HUMAN INSULIN 100 [IU]/ML
20 INJECTION, SUSPENSION SUBCUTANEOUS 2 TIMES DAILY
Qty: 15 PEN | Refills: 1 | Status: SHIPPED | OUTPATIENT
Start: 2021-01-11 | End: 2021-03-08

## 2021-01-11 NOTE — TELEPHONE ENCOUNTER
Insulin boxes come 5 to a box. Please advise new Novolin Rx to [facility].   Sabine Zuluaga, 01/11/21, 12:02 PM

## 2021-01-18 ENCOUNTER — ANTI-COAG VISIT (OUTPATIENT)
Dept: FAMILY MEDICINE CLINIC | Facility: CLINIC | Age: 66
End: 2021-01-18
Payer: COMMERCIAL

## 2021-01-18 DIAGNOSIS — I48.21 PERMANENT ATRIAL FIBRILLATION (HCC): ICD-10-CM

## 2021-01-18 DIAGNOSIS — Z79.01 LONG TERM (CURRENT) USE OF ANTICOAGULANTS: ICD-10-CM

## 2021-01-18 LAB — INR: 2.7 (ref 0.8–1.2)

## 2021-01-18 PROCEDURE — 85610 PROTHROMBIN TIME: CPT | Performed by: FAMILY MEDICINE

## 2021-01-18 PROCEDURE — 93793 ANTICOAG MGMT PT WARFARIN: CPT | Performed by: FAMILY MEDICINE

## 2021-01-18 NOTE — PROGRESS NOTES
Here for INR check in coumadin clinic in the office.     CURRENT COUMADIN DOSE:  10mg M,W,F and 7.5mg daily the rest of the week     CHANGES OR COMPLAINTS:  Grief, Father passed this week    INR RESULTS TODAY:  2.7 ( in goal)    PLAN:  CPM coumadin   Next I

## 2021-02-02 RX ORDER — LISINOPRIL 10 MG/1
TABLET ORAL
Qty: 90 TABLET | Refills: 1 | Status: SHIPPED | OUTPATIENT
Start: 2021-02-02 | End: 2021-05-11

## 2021-02-02 RX ORDER — OMEPRAZOLE 20 MG/1
20 CAPSULE, DELAYED RELEASE ORAL
Qty: 90 CAPSULE | Refills: 1 | Status: SHIPPED | OUTPATIENT
Start: 2021-02-02

## 2021-02-02 NOTE — TELEPHONE ENCOUNTER
Lisinopril:  Omeprazole: Both 7/27/20    Future appt:     Your appointments     Date & Time Appointment Department [facility])    Feb 15, 2021 11:30 AM CST Anticoagulation Visit with [facility] [address], Mikie Mccollum [facility]

## 2021-02-15 ENCOUNTER — ANTI-COAG VISIT (OUTPATIENT)
Dept: FAMILY MEDICINE CLINIC | Facility: CLINIC | Age: 66
End: 2021-02-15
Payer: COMMERCIAL

## 2021-02-15 DIAGNOSIS — I48.21 PERMANENT ATRIAL FIBRILLATION (HCC): ICD-10-CM

## 2021-02-15 DIAGNOSIS — Z79.01 LONG TERM (CURRENT) USE OF ANTICOAGULANTS: ICD-10-CM

## 2021-02-15 LAB — INR: 1.9 (ref 0.8–1.2)

## 2021-02-15 PROCEDURE — 85610 PROTHROMBIN TIME: CPT | Performed by: FAMILY MEDICINE

## 2021-02-15 NOTE — PROGRESS NOTES
Here for INR check in coumadin clinic in the office.     CURRENT COUMADIN DOSE:  10mg M,W,F and 7.5mg daily the rest of the week     CHANGES OR COMPLAINTS:  No changes    INR RESULTS TODAY:  Below goal    PLAN:  CPM coumadin   Next INR in 2 weeks  Appointme

## 2021-03-01 ENCOUNTER — ANTI-COAG VISIT (OUTPATIENT)
Dept: FAMILY MEDICINE CLINIC | Facility: CLINIC | Age: 66
End: 2021-03-01
Payer: COMMERCIAL

## 2021-03-01 ENCOUNTER — TELEPHONE (OUTPATIENT)
Dept: FAMILY MEDICINE CLINIC | Facility: CLINIC | Age: 66
End: 2021-03-01

## 2021-03-01 DIAGNOSIS — Z79.4 CONTROLLED TYPE 2 DIABETES MELLITUS WITH STAGE 2 CHRONIC KIDNEY DISEASE, WITH LONG-TERM CURRENT USE OF INSULIN (HCC): Primary | ICD-10-CM

## 2021-03-01 DIAGNOSIS — N18.2 CONTROLLED TYPE 2 DIABETES MELLITUS WITH STAGE 2 CHRONIC KIDNEY DISEASE, WITH LONG-TERM CURRENT USE OF INSULIN (HCC): Primary | ICD-10-CM

## 2021-03-01 DIAGNOSIS — Z79.01 LONG TERM (CURRENT) USE OF ANTICOAGULANTS: ICD-10-CM

## 2021-03-01 DIAGNOSIS — I48.21 PERMANENT ATRIAL FIBRILLATION (HCC): ICD-10-CM

## 2021-03-01 DIAGNOSIS — E11.22 CONTROLLED TYPE 2 DIABETES MELLITUS WITH STAGE 2 CHRONIC KIDNEY DISEASE, WITH LONG-TERM CURRENT USE OF INSULIN (HCC): Primary | ICD-10-CM

## 2021-03-01 LAB — INR: 2.3 (ref 0.8–1.2)

## 2021-03-01 PROCEDURE — 93793 ANTICOAG MGMT PT WARFARIN: CPT | Performed by: FAMILY MEDICINE

## 2021-03-01 PROCEDURE — 85610 PROTHROMBIN TIME: CPT | Performed by: FAMILY MEDICINE

## 2021-03-01 NOTE — TELEPHONE ENCOUNTER
Up coming lab appt on 3/6/21. Medication f/u on 3/8/21. Need lab orders please.      Future Appointments   Date Time Provider Anayeli Harrell   3/6/2021 10:15 AM REF [place] REF [facility] Ref [facility]c   3/8/2021  4:00 PM Kelly Knight MD [facility] [place] E

## 2021-03-01 NOTE — PROGRESS NOTES
Here for INR check in coumadin clinic in the office.     CURRENT COUMADIN DOSE:  10mg M,W,F and 7.5mg daily the rest of the week     CHANGES OR COMPLAINTS:  No changes    INR RESULTS TODAY:  2.3 ( in goal)     PLAN:  CPM coumadin   Next INR in one month  Ap

## 2021-03-06 ENCOUNTER — LABORATORY ENCOUNTER (OUTPATIENT)
Dept: LAB | Age: 66
End: 2021-03-06
Attending: FAMILY MEDICINE
Payer: COMMERCIAL

## 2021-03-06 DIAGNOSIS — Z79.4 CONTROLLED TYPE 2 DIABETES MELLITUS WITH STAGE 2 CHRONIC KIDNEY DISEASE, WITH LONG-TERM CURRENT USE OF INSULIN (HCC): ICD-10-CM

## 2021-03-06 DIAGNOSIS — E11.22 CONTROLLED TYPE 2 DIABETES MELLITUS WITH STAGE 2 CHRONIC KIDNEY DISEASE, WITH LONG-TERM CURRENT USE OF INSULIN (HCC): ICD-10-CM

## 2021-03-06 DIAGNOSIS — N18.2 CONTROLLED TYPE 2 DIABETES MELLITUS WITH STAGE 2 CHRONIC KIDNEY DISEASE, WITH LONG-TERM CURRENT USE OF INSULIN (HCC): ICD-10-CM

## 2021-03-06 LAB
ALBUMIN SERPL-MCNC: 3.5 G/DL (ref 3.4–5)
ALBUMIN/GLOB SERPL: 0.9 {RATIO} (ref 1–2)
ALP LIVER SERPL-CCNC: 84 U/L
ALT SERPL-CCNC: 54 U/L
ANION GAP SERPL CALC-SCNC: 7 MMOL/L (ref 0–18)
AST SERPL-CCNC: 26 U/L (ref 15–37)
BILIRUB SERPL-MCNC: 0.5 MG/DL (ref 0.1–2)
BUN BLD-MCNC: 18 MG/DL (ref 7–18)
BUN/CREAT SERPL: 23.1 (ref 10–20)
CALCIUM BLD-MCNC: 8.7 MG/DL (ref 8.5–10.1)
CHLORIDE SERPL-SCNC: 107 MMOL/L (ref 98–112)
CO2 SERPL-SCNC: 27 MMOL/L (ref 21–32)
CREAT BLD-MCNC: 0.78 MG/DL
EST. AVERAGE GLUCOSE BLD GHB EST-MCNC: 240 MG/DL (ref 68–126)
GLOBULIN PLAS-MCNC: 3.8 G/DL (ref 2.8–4.4)
GLUCOSE BLD-MCNC: 207 MG/DL (ref 70–99)
HBA1C MFR BLD HPLC: 10 % (ref ?–5.7)
M PROTEIN MFR SERPL ELPH: 7.3 G/DL (ref 6.4–8.2)
OSMOLALITY SERPL CALC.SUM OF ELEC: 300 MOSM/KG (ref 275–295)
PATIENT FASTING Y/N/NP: YES
POTASSIUM SERPL-SCNC: 3.9 MMOL/L (ref 3.5–5.1)
SODIUM SERPL-SCNC: 141 MMOL/L (ref 136–145)

## 2021-03-06 PROCEDURE — 80053 COMPREHEN METABOLIC PANEL: CPT | Performed by: FAMILY MEDICINE

## 2021-03-06 PROCEDURE — 3046F HEMOGLOBIN A1C LEVEL >9.0%: CPT | Performed by: FAMILY MEDICINE

## 2021-03-06 PROCEDURE — 83036 HEMOGLOBIN GLYCOSYLATED A1C: CPT | Performed by: FAMILY MEDICINE

## 2021-03-06 PROCEDURE — 36415 COLL VENOUS BLD VENIPUNCTURE: CPT | Performed by: FAMILY MEDICINE

## 2021-03-08 ENCOUNTER — OFFICE VISIT (OUTPATIENT)
Dept: FAMILY MEDICINE CLINIC | Facility: CLINIC | Age: 66
End: 2021-03-08
Payer: COMMERCIAL

## 2021-03-08 ENCOUNTER — TELEPHONE (OUTPATIENT)
Dept: FAMILY MEDICINE CLINIC | Facility: CLINIC | Age: 66
End: 2021-03-08

## 2021-03-08 VITALS
DIASTOLIC BLOOD PRESSURE: 72 MMHG | OXYGEN SATURATION: 95 % | BODY MASS INDEX: 37.94 KG/M2 | SYSTOLIC BLOOD PRESSURE: 124 MMHG | WEIGHT: 265 LBS | HEIGHT: 70 IN | RESPIRATION RATE: 18 BRPM | TEMPERATURE: 98 F | HEART RATE: 88 BPM

## 2021-03-08 DIAGNOSIS — Z79.4 CONTROLLED TYPE 2 DIABETES MELLITUS WITH STAGE 2 CHRONIC KIDNEY DISEASE, WITH LONG-TERM CURRENT USE OF INSULIN (HCC): Primary | ICD-10-CM

## 2021-03-08 DIAGNOSIS — I10 BENIGN ESSENTIAL HYPERTENSION: ICD-10-CM

## 2021-03-08 DIAGNOSIS — N18.2 CONTROLLED TYPE 2 DIABETES MELLITUS WITH STAGE 2 CHRONIC KIDNEY DISEASE, WITH LONG-TERM CURRENT USE OF INSULIN (HCC): Primary | ICD-10-CM

## 2021-03-08 DIAGNOSIS — I25.10 CORONARY ARTERY DISEASE INVOLVING NATIVE CORONARY ARTERY OF NATIVE HEART WITHOUT ANGINA PECTORIS: ICD-10-CM

## 2021-03-08 DIAGNOSIS — E11.22 CONTROLLED TYPE 2 DIABETES MELLITUS WITH STAGE 2 CHRONIC KIDNEY DISEASE, WITH LONG-TERM CURRENT USE OF INSULIN (HCC): Primary | ICD-10-CM

## 2021-03-08 PROCEDURE — 99214 OFFICE O/P EST MOD 30 MIN: CPT | Performed by: FAMILY MEDICINE

## 2021-03-08 PROCEDURE — 3078F DIAST BP <80 MM HG: CPT | Performed by: FAMILY MEDICINE

## 2021-03-08 PROCEDURE — 3074F SYST BP LT 130 MM HG: CPT | Performed by: FAMILY MEDICINE

## 2021-03-08 PROCEDURE — 3008F BODY MASS INDEX DOCD: CPT | Performed by: FAMILY MEDICINE

## 2021-03-08 RX ORDER — SERTRALINE HYDROCHLORIDE 100 MG/1
100 TABLET, FILM COATED ORAL DAILY
Qty: 90 TABLET | Refills: 1 | Status: SHIPPED | OUTPATIENT
Start: 2021-03-08 | End: 2021-09-17

## 2021-03-08 RX ORDER — DILTIAZEM HYDROCHLORIDE 180 MG/1
180 CAPSULE, COATED, EXTENDED RELEASE ORAL DAILY
Qty: 90 CAPSULE | Refills: 1 | Status: SHIPPED | OUTPATIENT
Start: 2021-03-08 | End: 2021-11-24

## 2021-03-08 RX ORDER — WARFARIN SODIUM 5 MG/1
7.5 TABLET ORAL DAILY
Qty: 135 TABLET | Refills: 1 | Status: SHIPPED | OUTPATIENT
Start: 2021-03-08 | End: 2021-08-31

## 2021-03-08 RX ORDER — ROSUVASTATIN CALCIUM 10 MG/1
10 TABLET, COATED ORAL NIGHTLY
Qty: 90 TABLET | Refills: 1 | Status: SHIPPED | OUTPATIENT
Start: 2021-03-08 | End: 2021-11-12

## 2021-03-08 RX ORDER — HUMAN INSULIN 100 [IU]/ML
25 INJECTION, SUSPENSION SUBCUTANEOUS 2 TIMES DAILY
Qty: 15 PEN | Refills: 1 | Status: SHIPPED | OUTPATIENT
Start: 2021-03-08 | End: 2021-06-28

## 2021-03-08 NOTE — PROGRESS NOTES
[address]  PROGRESS NOTE  Chief Complaint:   Patient presents with: Follow - Up: following up after bloodwork that was done on saturday      HPI:   This is a 61year old male coming in for follow-up on his diabetes.     He reports that Past Medical History:   Diagnosis Date   • Allergic rhinitis    • Atrial fibrillation ([facility]    • Depression    • Diabetes ([facility]    • Esophageal reflux    • Essential hypertension    • Hyperlipidemia    • Obesity    • S/p small bowel obstruction    • 180 tablet 1   • Insulin NPH & Regular (NOVOLIN 70/30 FLEXPEN) (70-30) 100 UNIT/ML Subcutaneous Suspension Pen-injector Inject 25 Units into the skin 2 (two) times a day.  15 pen 1   • dapagliflozin Propanediol (FARXIGA) 10 MG Oral Tab Take 1 tablet (10 mg sputum. GASTROINTESTINAL:  Denies abdominal pain, nausea, vomiting, constipation, diarrhea, or blood in stool. MUSCULOSKELETAL: He has a lot of pain in his left shoulder.   NEUROLOGICAL:  Denies headache, seizures, dizziness, syncope, paralysis, ataxia, n no clubbing, FROM, 2+ dorsalis pedis pulses bilaterally.   Bilateral barefoot skin diabetic exam is normal, visualized feet and the appearance is normal.  Bilateral monofilament/sensation of left foot is normal.  Right foot has diminished sensation in the t capsule 1     Sig: Take 1 capsule (180 mg total) by mouth daily.        Health Maintenance:  Zoster Vaccines(1 of 2) due on 01/26/2007  Annual Physical due on 03/16/2020  Diabetes Care Dilated Eye Exam due on 06/04/2020  Annual Depression Screen due on 01/2

## 2021-03-08 NOTE — TELEPHONE ENCOUNTER
Future Appointments   Date Time Provider Anayeli Harrell   3/8/2021  4:00 PM Ramses Torres MD [facility] [place] [facility]   4/6/2021 11:30 AM EMG COUMADIN NURSE [facility] [place] [facility] [place]

## 2021-03-08 NOTE — PATIENT INSTRUCTIONS
Increase Metformin to 500 mg twice a day. Increase Novolog to 25 units twice a day. Work on regular exercise (walking) daily. Recheck in 3 months.

## 2021-03-18 DIAGNOSIS — Z23 NEED FOR VACCINATION: ICD-10-CM

## 2021-04-06 ENCOUNTER — TELEPHONE (OUTPATIENT)
Dept: FAMILY MEDICINE CLINIC | Facility: CLINIC | Age: 66
End: 2021-04-06

## 2021-04-06 ENCOUNTER — ANTI-COAG VISIT (OUTPATIENT)
Dept: FAMILY MEDICINE CLINIC | Facility: CLINIC | Age: 66
End: 2021-04-06
Payer: COMMERCIAL

## 2021-04-06 DIAGNOSIS — I48.21 PERMANENT ATRIAL FIBRILLATION (HCC): ICD-10-CM

## 2021-04-06 DIAGNOSIS — Z79.01 LONG TERM (CURRENT) USE OF ANTICOAGULANTS: ICD-10-CM

## 2021-04-06 PROCEDURE — 93793 ANTICOAG MGMT PT WARFARIN: CPT | Performed by: FAMILY MEDICINE

## 2021-04-06 PROCEDURE — 85610 PROTHROMBIN TIME: CPT | Performed by: FAMILY MEDICINE

## 2021-04-06 NOTE — TELEPHONE ENCOUNTER
Please increase the NPH 70/30 insulin to 30 units twice a day. Let us know what the blood sugar readings are in about a week.

## 2021-04-06 NOTE — PROGRESS NOTES
Here for INR check in coumadin clinic in the office.     CURRENT COUMADIN DOSE:  10mg M,W,F and 7.5mg daily the rest of the week    CHANGES OR COMPLAINTS:  No changes    INR RESULTS TODAY:  2.2 ( in goal)    PLAN:  CPM coumadin   Next INR due in one month

## 2021-04-06 NOTE — TELEPHONE ENCOUNTER
Patient states Blood Sugar Readings:  156-280. Questions if Insulin dose remains the same? Please advise.   Tila, 04/06/21, 11:39 AM

## 2021-04-07 ENCOUNTER — TELEPHONE (OUTPATIENT)
Dept: FAMILY MEDICINE CLINIC | Facility: CLINIC | Age: 66
End: 2021-04-07

## 2021-04-07 NOTE — TELEPHONE ENCOUNTER
Pt has called back and I informed him of the recommendation that Dr. Bunny Concepcion recommended. He understands and has no further questions.

## 2021-04-07 NOTE — TELEPHONE ENCOUNTER
attempted to call the pt again and he was unable to be reached.     V.CRISPIN has not been set up at this time

## 2021-04-14 ENCOUNTER — TELEPHONE (OUTPATIENT)
Dept: FAMILY MEDICINE CLINIC | Facility: CLINIC | Age: 66
End: 2021-04-14

## 2021-04-14 NOTE — TELEPHONE ENCOUNTER
He should be taking Metformin 500 mg twice a day so the correct number of pills is 180 for a 3-month supply.

## 2021-04-14 NOTE — TELEPHONE ENCOUNTER
Patient informed he is taking Metformin 500mg BID. Informed Rx for Metformin sent 3/8/21 for a 90 day supply  Of #180. He will get this prescription filled for #180 tabs.

## 2021-04-14 NOTE — TELEPHONE ENCOUNTER
Refill for Metformin - needs it to be 360 tabs - Call into [facility]  ( patient states script not up to date at pharmacy)

## 2021-05-03 ENCOUNTER — ANTI-COAG VISIT (OUTPATIENT)
Dept: FAMILY MEDICINE CLINIC | Facility: CLINIC | Age: 66
End: 2021-05-03
Payer: COMMERCIAL

## 2021-05-03 DIAGNOSIS — Z79.01 LONG TERM (CURRENT) USE OF ANTICOAGULANTS: ICD-10-CM

## 2021-05-03 DIAGNOSIS — I48.21 PERMANENT ATRIAL FIBRILLATION (HCC): ICD-10-CM

## 2021-05-03 PROCEDURE — 93793 ANTICOAG MGMT PT WARFARIN: CPT | Performed by: FAMILY MEDICINE

## 2021-05-03 PROCEDURE — 85610 PROTHROMBIN TIME: CPT | Performed by: FAMILY MEDICINE

## 2021-05-03 NOTE — PROGRESS NOTES
Here for INR check in coumadin clinic in the office.     CURRENT COUMADIN DOSE:  10mg M,W,F and 7.5mg all other days of the week    CHANGES OR COMPLAINTS:  Second Covid vaccine last week    INR RESULTS TODAY:  2.1 ( in goal)    PLAN:  CPM coumadin   Next IN

## 2021-05-11 RX ORDER — LISINOPRIL 10 MG/1
TABLET ORAL
Qty: 90 TABLET | Refills: 1 | Status: SHIPPED | OUTPATIENT
Start: 2021-05-11

## 2021-05-11 NOTE — TELEPHONE ENCOUNTER
Lisinopril: 2/2/21     Return in about 3 months (around 6/8/2021). Future appt:     Your appointments     Date & Time Appointment Department [facility])    Jun 07, 2021  8:30 AM CDT Anticoagulation Visit with [facility] [place]

## 2021-06-07 ENCOUNTER — ANTI-COAG VISIT (OUTPATIENT)
Dept: FAMILY MEDICINE CLINIC | Facility: CLINIC | Age: 66
End: 2021-06-07
Payer: COMMERCIAL

## 2021-06-07 ENCOUNTER — OFFICE VISIT (OUTPATIENT)
Dept: FAMILY MEDICINE CLINIC | Facility: CLINIC | Age: 66
End: 2021-06-07
Payer: COMMERCIAL

## 2021-06-07 VITALS
RESPIRATION RATE: 16 BRPM | TEMPERATURE: 97 F | DIASTOLIC BLOOD PRESSURE: 80 MMHG | SYSTOLIC BLOOD PRESSURE: 118 MMHG | OXYGEN SATURATION: 96 % | BODY MASS INDEX: 38.51 KG/M2 | WEIGHT: 269 LBS | HEIGHT: 70 IN | HEART RATE: 59 BPM

## 2021-06-07 DIAGNOSIS — R07.89 CHEST TIGHTNESS: Primary | ICD-10-CM

## 2021-06-07 DIAGNOSIS — I48.21 PERMANENT ATRIAL FIBRILLATION (HCC): ICD-10-CM

## 2021-06-07 DIAGNOSIS — Z79.01 LONG TERM (CURRENT) USE OF ANTICOAGULANTS: ICD-10-CM

## 2021-06-07 PROCEDURE — 93000 ELECTROCARDIOGRAM COMPLETE: CPT | Performed by: NURSE PRACTITIONER

## 2021-06-07 PROCEDURE — 3079F DIAST BP 80-89 MM HG: CPT | Performed by: NURSE PRACTITIONER

## 2021-06-07 PROCEDURE — 85610 PROTHROMBIN TIME: CPT | Performed by: FAMILY MEDICINE

## 2021-06-07 PROCEDURE — 99213 OFFICE O/P EST LOW 20 MIN: CPT | Performed by: NURSE PRACTITIONER

## 2021-06-07 PROCEDURE — 3008F BODY MASS INDEX DOCD: CPT | Performed by: NURSE PRACTITIONER

## 2021-06-07 PROCEDURE — 3074F SYST BP LT 130 MM HG: CPT | Performed by: NURSE PRACTITIONER

## 2021-06-07 NOTE — PROGRESS NOTES
HPI:    Patient ID: Dayday Underwood is a 61year old male. HPI     Started with chest pressure at 5 -6 to the mid sternal area when getting dressed this morning. Pain non-radiating. No cough or shortness of breath.    No GI symptoms since resuming omepra E11.9 1 kit 0   • Glucose Blood In Vitro Strip One Touch Ultra Blue Test Strips. 2x daily glucose testing.  Dx: E11.9  Insulin Dependent 200 strip 3   • Insulin Pen Needle (BD PEN NEEDLE THOMPSON U/F) 32G X 4 MM Does not apply Misc Inject 1 Dose into the skin Mood and Affect: Mood normal.         Behavior: Behavior normal.         Thought Content:  Thought content normal.                ASSESSMENT/PLAN:   Chest tightness  (primary encounter diagnosis)    No orders of the defined types were placed in this

## 2021-06-07 NOTE — PATIENT INSTRUCTIONS
Take omeprazole 1 hour before meals or 2 hours after for maximum effectiveness. EKG: no changes from 2017. If pains worsen, go to the ER. Schedule physical with Dr. Alyssa Marlow in the near future.

## 2021-06-22 ENCOUNTER — LAB ENCOUNTER (OUTPATIENT)
Dept: LAB | Age: 66
End: 2021-06-22
Attending: FAMILY MEDICINE
Payer: COMMERCIAL

## 2021-06-22 DIAGNOSIS — Z79.4 CONTROLLED TYPE 2 DIABETES MELLITUS WITH STAGE 2 CHRONIC KIDNEY DISEASE, WITH LONG-TERM CURRENT USE OF INSULIN (HCC): ICD-10-CM

## 2021-06-22 DIAGNOSIS — I10 BENIGN ESSENTIAL HYPERTENSION: ICD-10-CM

## 2021-06-22 DIAGNOSIS — I25.10 CORONARY ARTERY DISEASE INVOLVING NATIVE CORONARY ARTERY OF NATIVE HEART WITHOUT ANGINA PECTORIS: ICD-10-CM

## 2021-06-22 DIAGNOSIS — E11.22 CONTROLLED TYPE 2 DIABETES MELLITUS WITH STAGE 2 CHRONIC KIDNEY DISEASE, WITH LONG-TERM CURRENT USE OF INSULIN (HCC): ICD-10-CM

## 2021-06-22 DIAGNOSIS — N18.2 CONTROLLED TYPE 2 DIABETES MELLITUS WITH STAGE 2 CHRONIC KIDNEY DISEASE, WITH LONG-TERM CURRENT USE OF INSULIN (HCC): ICD-10-CM

## 2021-06-22 PROCEDURE — 3051F HG A1C>EQUAL 7.0%<8.0%: CPT | Performed by: FAMILY MEDICINE

## 2021-06-22 PROCEDURE — 83036 HEMOGLOBIN GLYCOSYLATED A1C: CPT | Performed by: FAMILY MEDICINE

## 2021-06-22 PROCEDURE — 80053 COMPREHEN METABOLIC PANEL: CPT | Performed by: FAMILY MEDICINE

## 2021-06-28 ENCOUNTER — OFFICE VISIT (OUTPATIENT)
Dept: FAMILY MEDICINE CLINIC | Facility: CLINIC | Age: 66
End: 2021-06-28
Payer: COMMERCIAL

## 2021-06-28 VITALS
WEIGHT: 269 LBS | HEIGHT: 70 IN | OXYGEN SATURATION: 99 % | HEART RATE: 80 BPM | BODY MASS INDEX: 38.51 KG/M2 | SYSTOLIC BLOOD PRESSURE: 120 MMHG | RESPIRATION RATE: 16 BRPM | TEMPERATURE: 99 F | DIASTOLIC BLOOD PRESSURE: 76 MMHG

## 2021-06-28 DIAGNOSIS — K21.9 GASTROESOPHAGEAL REFLUX DISEASE WITHOUT ESOPHAGITIS: ICD-10-CM

## 2021-06-28 DIAGNOSIS — E11.22 CONTROLLED TYPE 2 DIABETES MELLITUS WITH STAGE 2 CHRONIC KIDNEY DISEASE, WITH LONG-TERM CURRENT USE OF INSULIN (HCC): ICD-10-CM

## 2021-06-28 DIAGNOSIS — Z79.4 CONTROLLED TYPE 2 DIABETES MELLITUS WITH STAGE 2 CHRONIC KIDNEY DISEASE, WITH LONG-TERM CURRENT USE OF INSULIN (HCC): ICD-10-CM

## 2021-06-28 DIAGNOSIS — Z00.00 ROUTINE GENERAL MEDICAL EXAMINATION AT A HEALTH CARE FACILITY: Primary | ICD-10-CM

## 2021-06-28 DIAGNOSIS — B35.1 DERMATOPHYTOSIS OF NAIL: ICD-10-CM

## 2021-06-28 DIAGNOSIS — I10 BENIGN ESSENTIAL HYPERTENSION: ICD-10-CM

## 2021-06-28 DIAGNOSIS — E78.49 FAMILIAL MULTIPLE LIPOPROTEIN-TYPE HYPERLIPIDEMIA: ICD-10-CM

## 2021-06-28 DIAGNOSIS — F33.1 DEPRESSION, MAJOR, RECURRENT, MODERATE (HCC): ICD-10-CM

## 2021-06-28 DIAGNOSIS — N18.2 CONTROLLED TYPE 2 DIABETES MELLITUS WITH STAGE 2 CHRONIC KIDNEY DISEASE, WITH LONG-TERM CURRENT USE OF INSULIN (HCC): ICD-10-CM

## 2021-06-28 DIAGNOSIS — I25.10 CORONARY ARTERY DISEASE INVOLVING NATIVE CORONARY ARTERY OF NATIVE HEART WITHOUT ANGINA PECTORIS: ICD-10-CM

## 2021-06-28 DIAGNOSIS — Z95.1 HX OF CABG: ICD-10-CM

## 2021-06-28 DIAGNOSIS — M25.512 PAIN IN JOINT OF LEFT SHOULDER: ICD-10-CM

## 2021-06-28 DIAGNOSIS — E66.01 CLASS 2 SEVERE OBESITY DUE TO EXCESS CALORIES WITH SERIOUS COMORBIDITY AND BODY MASS INDEX (BMI) OF 35.0 TO 35.9 IN ADULT (HCC): ICD-10-CM

## 2021-06-28 PROBLEM — K92.2 GI BLEED: Status: RESOLVED | Noted: 2018-06-28 | Resolved: 2021-06-28

## 2021-06-28 PROCEDURE — 3074F SYST BP LT 130 MM HG: CPT | Performed by: FAMILY MEDICINE

## 2021-06-28 PROCEDURE — 3078F DIAST BP <80 MM HG: CPT | Performed by: FAMILY MEDICINE

## 2021-06-28 PROCEDURE — 3008F BODY MASS INDEX DOCD: CPT | Performed by: FAMILY MEDICINE

## 2021-06-28 PROCEDURE — 99396 PREV VISIT EST AGE 40-64: CPT | Performed by: FAMILY MEDICINE

## 2021-06-28 RX ORDER — HUMAN INSULIN 100 [IU]/ML
30 INJECTION, SUSPENSION SUBCUTANEOUS 2 TIMES DAILY
Refills: 0 | COMMUNITY
Start: 2021-06-28 | End: 2021-11-30

## 2021-06-28 NOTE — PROGRESS NOTES
[place] [facility] [place]  PROGRESS NOTE  Chief Complaint:   Patient presents with:  Physical      HPI:   This is a 61year old male coming in for his annual wellness visit. He reports that over the summer he has been much more physically active.   H Past Surgical History:   Procedure Laterality Date   • APPENDECTOMY     • BACK SURGERY     • COLONOSCOPY     • HAND ORTHOSIS, METACARPAL FRACTURE ORTHOSIS, PREFABRICATED, INCLUDES FITTING  2010   • KNEE SCOPE,MED/LAT MENISECTOMY  02/28/2007   • REPAIR (FARXIGA) 10 MG Oral Tab Take 1 tablet (10 mg total) by mouth daily. 90 tablet 1   • dilTIAZem HCl ER Coated Beads 180 MG Oral Capsule SR 24 Hr Take 1 capsule (180 mg total) by mouth daily.  90 capsule 1   • OMEPRAZOLE 20 MG Oral Capsule Delayed Release LUIS or bruising. LYMPHATICS:  Denies enlarged nodes or history of splenectomy. PSYCHIATRIC: His depression is significantly improved now. ENDOCRINOLOGIC: Blood sugars have been much better.   He is seeing blood sugars now between 90 and 150 pretty consistent both lower legs/feet is normal as well. ASSESSMENT AND PLAN:   1. Routine general medical examination at a health care facility  This is his annual wellness visit. He was able to receive the Covid vaccine.   He is due for an eye exam but is not able have gastroesophageal reflux disease. He is not having esophagitis. 9. Dermatophytosis of nail  He has nail disease on his toenails. No changes now. 10. Pain in joint of left shoulder  He does have chronic pain in his left shoulder.     11. 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

## 2021-07-06 ENCOUNTER — ANTI-COAG VISIT (OUTPATIENT)
Dept: FAMILY MEDICINE CLINIC | Facility: CLINIC | Age: 66
End: 2021-07-06
Payer: COMMERCIAL

## 2021-07-06 DIAGNOSIS — Z79.01 LONG TERM (CURRENT) USE OF ANTICOAGULANTS: ICD-10-CM

## 2021-07-06 DIAGNOSIS — I48.21 PERMANENT ATRIAL FIBRILLATION (HCC): ICD-10-CM

## 2021-07-06 LAB — INR: 1.8 (ref 0.8–1.2)

## 2021-07-06 PROCEDURE — 85610 PROTHROMBIN TIME: CPT | Performed by: FAMILY MEDICINE

## 2021-07-06 PROCEDURE — 93793 ANTICOAG MGMT PT WARFARIN: CPT | Performed by: FAMILY MEDICINE

## 2021-07-06 NOTE — PROGRESS NOTES
Here for INR check in coumadin clinic in the office.     CURRENT COUMADIN DOSE:  7.5mg M,W,F and 10mg all other days of the week     CHANGES OR COMPLAINTS:  Missed dose    INR RESULTS TODAY:  1.8 ( slightly below goal)    PLAN:  CPM coumadin   Next INR in

## 2021-08-03 ENCOUNTER — ANTI-COAG VISIT (OUTPATIENT)
Dept: FAMILY MEDICINE CLINIC | Facility: CLINIC | Age: 66
End: 2021-08-03
Payer: COMMERCIAL

## 2021-08-03 DIAGNOSIS — Z79.01 LONG TERM (CURRENT) USE OF ANTICOAGULANTS: ICD-10-CM

## 2021-08-03 DIAGNOSIS — I48.21 PERMANENT ATRIAL FIBRILLATION (HCC): ICD-10-CM

## 2021-08-03 LAB — INR: 2.5 (ref 0.8–1.2)

## 2021-08-03 PROCEDURE — 85610 PROTHROMBIN TIME: CPT | Performed by: FAMILY MEDICINE

## 2021-08-03 PROCEDURE — 93793 ANTICOAG MGMT PT WARFARIN: CPT | Performed by: FAMILY MEDICINE

## 2021-08-03 NOTE — PROGRESS NOTES
Here for INR check in coumadin clinic in the office.     CURRENT COUMADIN DOSE:  10mg M,W,F and 7.5mg all other days of the week    CHANGES OR COMPLAINTS:  No changes    INR RESULTS TODAY:  2.5 ( in goal)    PLAN:  CPM coumadin   Next INR in one month  Appo

## 2021-08-23 RX ORDER — PEN NEEDLE, DIABETIC 32GX 5/32"
NEEDLE, DISPOSABLE MISCELLANEOUS
Qty: 200 EACH | Refills: 1 | Status: SHIPPED | OUTPATIENT
Start: 2021-08-23

## 2021-08-23 NOTE — TELEPHONE ENCOUNTER
Future appt:     Your appointments     Date & Time Appointment Department [facility])    Sep 07, 2021  9:45 AM CDT Anticoagulation Visit with [place] [address] [place] [address],[address], Panchito Cordova ([facility])        Nov 22, 2021  8

## 2021-08-28 NOTE — PATIENT INSTRUCTIONS
INR elevated at 3.3. Decrease coumadin to 7.5mg daily. INR in one week. Watch for bleeding such as black tarry stool, bloody stool, blood in urine, unusual bruising or significant nose bleeds. Please call if these symptoms occur.    If you start a n
97

## 2021-08-31 ENCOUNTER — TELEPHONE (OUTPATIENT)
Dept: FAMILY MEDICINE CLINIC | Facility: CLINIC | Age: 66
End: 2021-08-31

## 2021-08-31 RX ORDER — WARFARIN SODIUM 5 MG/1
TABLET ORAL
Qty: 144 TABLET | Refills: 1 | Status: SHIPPED | OUTPATIENT
Start: 2021-08-31 | End: 2021-11-17

## 2021-08-31 RX ORDER — WARFARIN SODIUM 5 MG/1
TABLET ORAL
Qty: 135 TABLET | Refills: 1 | Status: SHIPPED | OUTPATIENT
Start: 2021-08-31 | End: 2021-08-31

## 2021-08-31 NOTE — TELEPHONE ENCOUNTER
Patient states problem with his Warfarin Rx. [facility]oned [facility].  They state a 90 day supply is #144 tabs. Requesting new Rx sent.   Lizbeth Alcaraz CMA, 08/31/21, 4:15 PM

## 2021-09-07 ENCOUNTER — ANTI-COAG VISIT (OUTPATIENT)
Dept: FAMILY MEDICINE CLINIC | Facility: CLINIC | Age: 66
End: 2021-09-07
Payer: COMMERCIAL

## 2021-09-07 DIAGNOSIS — I48.21 PERMANENT ATRIAL FIBRILLATION (HCC): ICD-10-CM

## 2021-09-07 DIAGNOSIS — Z79.01 LONG TERM (CURRENT) USE OF ANTICOAGULANTS: ICD-10-CM

## 2021-09-07 LAB — INR: 2.4 (ref 0.8–1.2)

## 2021-09-07 PROCEDURE — 93793 ANTICOAG MGMT PT WARFARIN: CPT | Performed by: FAMILY MEDICINE

## 2021-09-07 PROCEDURE — 85610 PROTHROMBIN TIME: CPT | Performed by: FAMILY MEDICINE

## 2021-09-13 NOTE — PROGRESS NOTES
Here for INR check in coumadin clinic in the office.     CURRENT COUMADIN DOSE:  10mg M,W,F and 7.5mg all other days of the week    CHANGES OR COMPLAINTS:  No changes    INR RESULTS TODAY:  2.4 ( in goal)     PLAN:  CPM coumadin   Next INR due in one month

## 2021-09-16 NOTE — TELEPHONE ENCOUNTER
Future appt:     Your appointments     Date & Time Appointment Department [facility])    Oct 04, 2021  9:30 AM CDT Anticoagulation Visit with RACHEAL [address], [facility] ([facility])        Nov 22, 2021  8

## 2021-09-17 ENCOUNTER — TELEPHONE (OUTPATIENT)
Dept: FAMILY MEDICINE CLINIC | Facility: CLINIC | Age: 66
End: 2021-09-17

## 2021-09-17 DIAGNOSIS — Z79.01 LONG TERM (CURRENT) USE OF ANTICOAGULANTS: Primary | ICD-10-CM

## 2021-09-17 RX ORDER — SERTRALINE HYDROCHLORIDE 100 MG/1
TABLET, FILM COATED ORAL
Qty: 90 TABLET | Refills: 1 | Status: SHIPPED | OUTPATIENT
Start: 2021-09-17

## 2021-09-17 RX ORDER — DAPAGLIFLOZIN 10 MG/1
TABLET, FILM COATED ORAL
Qty: 90 TABLET | Refills: 1 | Status: SHIPPED | OUTPATIENT
Start: 2021-09-17

## 2021-09-17 NOTE — TELEPHONE ENCOUNTER
INR rescheduled to be a lab draw. Standing order placed. Appt in lab scheduled.      Future Appointments   Date Time Provider Anayeli Harrell   10/4/2021  9:45 AM REF [place] REF [facility] Ref [facility]   11/22/2021  8:15 AM REF [place] REF [facility] Ref Syc   11

## 2021-10-01 ENCOUNTER — TELEPHONE (OUTPATIENT)
Dept: FAMILY MEDICINE CLINIC | Facility: CLINIC | Age: 66
End: 2021-10-01

## 2021-10-01 NOTE — TELEPHONE ENCOUNTER
Needs ER ([facility])  f/u for injured right knee / poss torn ligaments on 9/30/21 asap. No opening for 2 weeks, offered appt with other provider but pt prefers to see Dr. Valente Emmanuel.

## 2021-10-01 NOTE — TELEPHONE ENCOUNTER
Patient states he fell yesterday on his knee. Went to [facility] ER. Nothing broken. Possible torn ligaments. Was advised to follow up with PCP.     Patient advised to call [facility] to see knee specialist.    States he has an appointment with    on Thurs

## 2021-10-04 ENCOUNTER — LABORATORY ENCOUNTER (OUTPATIENT)
Dept: LAB | Age: 66
End: 2021-10-04
Attending: FAMILY MEDICINE
Payer: COMMERCIAL

## 2021-10-04 DIAGNOSIS — Z79.01 LONG TERM (CURRENT) USE OF ANTICOAGULANTS: ICD-10-CM

## 2021-10-04 PROCEDURE — 85610 PROTHROMBIN TIME: CPT

## 2021-10-04 PROCEDURE — 36415 COLL VENOUS BLD VENIPUNCTURE: CPT

## 2021-10-05 ENCOUNTER — ANTI-COAG VISIT (OUTPATIENT)
Dept: FAMILY MEDICINE CLINIC | Facility: CLINIC | Age: 66
End: 2021-10-05

## 2021-10-05 DIAGNOSIS — Z79.01 LONG TERM (CURRENT) USE OF ANTICOAGULANTS: ICD-10-CM

## 2021-10-05 DIAGNOSIS — I48.21 PERMANENT ATRIAL FIBRILLATION (HCC): ICD-10-CM

## 2021-10-05 NOTE — PROGRESS NOTES
INR is within range at 2. 12.  Continue the same dose of warfarin and recheck in 1 month.    Written by Jayla Robles MD on 10/5/2021 11:04 AM CDT

## 2021-10-05 NOTE — PROGRESS NOTES
Patient informed of below. Expressed understanding.   Anyi Waddell, [address] [place] [address], 10/05/21, 1:09 PM

## 2021-10-08 ENCOUNTER — TELEPHONE (OUTPATIENT)
Dept: FAMILY MEDICINE CLINIC | Facility: CLINIC | Age: 66
End: 2021-10-08

## 2021-10-08 NOTE — TELEPHONE ENCOUNTER
pt returning your call  Future Appointments   Date Time Provider Anayeli Sharri   11/22/2021  8:15 AM REF [place] REF [facility] Ref [facility]   11/29/2021 10:30 AM Sreekanth Felix MD [facility] [place] [facility] [place]

## 2021-10-08 NOTE — TELEPHONE ENCOUNTER
Recommend to stop warfarin 5 days before procedure. Resume warfarin 24 hours after surgery. Also recommend Lovenox bridge starting 3 days before surgery.

## 2021-10-08 NOTE — TELEPHONE ENCOUNTER
Patient states he may be having knee surgery next week with Dr. Lilibeth Tejeda at [facility]. Patient is not sure exactly what day yet.   States he fell, had a MRI today, and was informed he has some torn ligaments and surgery needs to be done within 2 weeks of the injur

## 2021-10-08 NOTE — TELEPHONE ENCOUNTER
Patient informed of the below recommendations. Patient will talk to the surgeon and give these recommendations. Patient will also c/b if a preop appt is needed.

## 2021-10-11 ENCOUNTER — OFFICE VISIT (OUTPATIENT)
Dept: FAMILY MEDICINE CLINIC | Facility: CLINIC | Age: 66
End: 2021-10-11
Payer: COMMERCIAL

## 2021-10-11 ENCOUNTER — TELEPHONE (OUTPATIENT)
Dept: FAMILY MEDICINE CLINIC | Facility: CLINIC | Age: 66
End: 2021-10-11

## 2021-10-11 ENCOUNTER — LABORATORY ENCOUNTER (OUTPATIENT)
Dept: LAB | Age: 66
End: 2021-10-11
Attending: FAMILY MEDICINE
Payer: COMMERCIAL

## 2021-10-11 ENCOUNTER — HOSPITAL ENCOUNTER (OUTPATIENT)
Dept: GENERAL RADIOLOGY | Age: 66
Discharge: HOME | End: 2021-10-11
Attending: FAMILY MEDICINE
Payer: COMMERCIAL

## 2021-10-11 VITALS
RESPIRATION RATE: 20 BRPM | TEMPERATURE: 97 F | BODY MASS INDEX: 37.94 KG/M2 | OXYGEN SATURATION: 95 % | HEART RATE: 100 BPM | HEIGHT: 70 IN | WEIGHT: 265 LBS | SYSTOLIC BLOOD PRESSURE: 102 MMHG | DIASTOLIC BLOOD PRESSURE: 74 MMHG

## 2021-10-11 DIAGNOSIS — S76.111D RUPTURE QUADRICEPS TENDON, RIGHT, SUBSEQUENT ENCOUNTER: ICD-10-CM

## 2021-10-11 DIAGNOSIS — Z01.810 PREOP CARDIOVASCULAR EXAM: ICD-10-CM

## 2021-10-11 DIAGNOSIS — E78.49 FAMILIAL MULTIPLE LIPOPROTEIN-TYPE HYPERLIPIDEMIA: ICD-10-CM

## 2021-10-11 DIAGNOSIS — I10 BENIGN ESSENTIAL HYPERTENSION: ICD-10-CM

## 2021-10-11 DIAGNOSIS — I48.21 PERMANENT ATRIAL FIBRILLATION (HCC): ICD-10-CM

## 2021-10-11 DIAGNOSIS — N18.2 CONTROLLED TYPE 2 DIABETES MELLITUS WITH STAGE 2 CHRONIC KIDNEY DISEASE, WITH LONG-TERM CURRENT USE OF INSULIN (HCC): ICD-10-CM

## 2021-10-11 DIAGNOSIS — Z79.4 CONTROLLED TYPE 2 DIABETES MELLITUS WITH STAGE 2 CHRONIC KIDNEY DISEASE, WITH LONG-TERM CURRENT USE OF INSULIN (HCC): ICD-10-CM

## 2021-10-11 DIAGNOSIS — E11.22 CONTROLLED TYPE 2 DIABETES MELLITUS WITH STAGE 2 CHRONIC KIDNEY DISEASE, WITH LONG-TERM CURRENT USE OF INSULIN (HCC): ICD-10-CM

## 2021-10-11 DIAGNOSIS — I25.10 CORONARY ARTERY DISEASE INVOLVING NATIVE CORONARY ARTERY OF NATIVE HEART WITHOUT ANGINA PECTORIS: ICD-10-CM

## 2021-10-11 DIAGNOSIS — Z01.818 PREOP EXAMINATION: Primary | ICD-10-CM

## 2021-10-11 PROBLEM — S76.111A QUADRICEPS TENDON RUPTURE, RIGHT, INITIAL ENCOUNTER: Status: ACTIVE | Noted: 2021-10-11

## 2021-10-11 PROCEDURE — 3078F DIAST BP <80 MM HG: CPT | Performed by: FAMILY MEDICINE

## 2021-10-11 PROCEDURE — 85610 PROTHROMBIN TIME: CPT

## 2021-10-11 PROCEDURE — 99215 OFFICE O/P EST HI 40 MIN: CPT | Performed by: FAMILY MEDICINE

## 2021-10-11 PROCEDURE — 81001 URINALYSIS AUTO W/SCOPE: CPT

## 2021-10-11 PROCEDURE — 3074F SYST BP LT 130 MM HG: CPT | Performed by: FAMILY MEDICINE

## 2021-10-11 PROCEDURE — 3008F BODY MASS INDEX DOCD: CPT | Performed by: FAMILY MEDICINE

## 2021-10-11 PROCEDURE — 36415 COLL VENOUS BLD VENIPUNCTURE: CPT

## 2021-10-11 PROCEDURE — 83735 ASSAY OF MAGNESIUM: CPT

## 2021-10-11 PROCEDURE — 83036 HEMOGLOBIN GLYCOSYLATED A1C: CPT

## 2021-10-11 PROCEDURE — 71046 X-RAY EXAM CHEST 2 VIEWS: CPT | Performed by: FAMILY MEDICINE

## 2021-10-11 PROCEDURE — 85025 COMPLETE CBC W/AUTO DIFF WBC: CPT

## 2021-10-11 PROCEDURE — 3051F HG A1C>EQUAL 7.0%<8.0%: CPT | Performed by: FAMILY MEDICINE

## 2021-10-11 PROCEDURE — 80053 COMPREHEN METABOLIC PANEL: CPT

## 2021-10-11 PROCEDURE — 93000 ELECTROCARDIOGRAM COMPLETE: CPT | Performed by: FAMILY MEDICINE

## 2021-10-11 NOTE — PROGRESS NOTES
[facility] [place]  PROGRESS NOTE  Chief Complaint:   Patient presents with:  Pre-Op Exam: no DM eye done      HPI:   This is a 61year old male coming in for  preop clearance  Usually sees Dr. Landrum Merlin. Smiley Serrano.     Pt with torn quad muscle and ligame Smoking status: Never Smoker      Smokeless tobacco: Never Used    Vaping Use      Vaping Use: Never used    Substance and Sexual Activity      Alcohol use: No        Alcohol/week: 0.0 standard drinks      Drug use: No    Other Topics      Concerns: 20 MG Oral Capsule Delayed Release TAKE 1 CAPSULE (20 MG TOTAL) BY MOUTH ONCE DAILY. 90 capsule 1   • Blood Glucose Monitoring Suppl (ONETOUCH ULTRA 2) w/Device Does not apply Kit OneTouch Ultra or Ultra 2 Glucometer. 2x daily glucose testing.   Dx: E11.9 SpO2 95%   BMI 38.02 kg/m²  Estimated body mass index is 38.02 kg/m² as calculated from the following:    Height as of this encounter: 5' 10\" (1.778 m). Weight as of this encounter: 265 lb (120.2 kg). Vital signs reviewed. Appears stated age, well edwardo test < 2 yr- reassuring, pt asymptomatic, EKG sinus rhythm. Pt aware cardiac risk of anesthesia    6. Permanent atrial fibrillation (HCC)  Presently sinus rhythm, coumadin help. Does not need bridge as in sinus rhythm and surgery in 2-3  days  7.  Familial effects or complications from the treatments as a result of today.

## 2021-10-11 NOTE — TELEPHONE ENCOUNTER
Pt stopped by on his way out and states Dr. More Le mentioned something about a bridge as pt is not taking his warfarin before his procedure. Please advise and c/b.

## 2021-10-11 NOTE — PATIENT INSTRUCTIONS
preop labs, cxr and EKG today    Continue medication-- NO COUMADIN, NO ASPIRIN, NO MOTRIN/ ADVIL    FURTHER RECOMMENDATIONS PENDING RESULTS

## 2021-10-12 ENCOUNTER — TELEPHONE (OUTPATIENT)
Dept: FAMILY MEDICINE CLINIC | Facility: CLINIC | Age: 66
End: 2021-10-12

## 2021-10-12 NOTE — PROGRESS NOTES
Preop testing results reviewed.     EKG Sinus Rhythm rate 93  Poor R wave progression consider old anterior infarct  No change fro EKG 6/2021    CXR FINDINGS:     Aortic atherosclerosis.  Cardiac silhouette is normal in size.  No lobar consolidation.  No si

## 2021-10-12 NOTE — TELEPHONE ENCOUNTER
Lovenox bridging is not indicated at this time. Please see comments regarding laboratory results and chest x-ray results. Patient medically stable to proceed with surgery. Final preoperative note to be sent to orthopedics.

## 2021-10-12 NOTE — TELEPHONE ENCOUNTER
----- Message from Merlinda Deacon, MD sent at 10/12/2021  9:33 AM CDT -----  Preoperative laboratory results reviewed. Nonfasting glucose 125. Electrolytes normal.  Liver and kidney function normal.  Patient magnesium level normal.  Patient hemoglobin A

## 2021-10-12 NOTE — TELEPHONE ENCOUNTER
LM notifying patient as written below, to return call only with questions. Pre-op paperwork faxed Attn: Vanessa Triana, [phone].

## 2021-10-13 NOTE — TELEPHONE ENCOUNTER
Metformin: 3/8/21     Return in about 5 months (around 11/28/2021). Future appt:     Your appointments     Date & Time Appointment Department [facility])    Nov 22, 2021  8:15 AM CST Laboratory Visit with REF Amira Mcnulty Reference Lab ([facility] Ref Lab [facility]

## 2021-10-18 ENCOUNTER — LABORATORY ENCOUNTER (OUTPATIENT)
Dept: LAB | Age: 66
End: 2021-10-18
Attending: FAMILY MEDICINE
Payer: COMMERCIAL

## 2021-10-18 DIAGNOSIS — Z79.01 LONG TERM (CURRENT) USE OF ANTICOAGULANTS: ICD-10-CM

## 2021-10-18 PROCEDURE — 85610 PROTHROMBIN TIME: CPT

## 2021-10-18 PROCEDURE — 36415 COLL VENOUS BLD VENIPUNCTURE: CPT

## 2021-10-19 ENCOUNTER — ANTI-COAG VISIT (OUTPATIENT)
Dept: FAMILY MEDICINE CLINIC | Facility: CLINIC | Age: 66
End: 2021-10-19

## 2021-10-19 ENCOUNTER — TELEPHONE (OUTPATIENT)
Dept: FAMILY MEDICINE CLINIC | Facility: CLINIC | Age: 66
End: 2021-10-19

## 2021-10-19 DIAGNOSIS — I48.21 PERMANENT ATRIAL FIBRILLATION (HCC): ICD-10-CM

## 2021-10-19 DIAGNOSIS — Z79.01 LONG TERM (CURRENT) USE OF ANTICOAGULANTS: ICD-10-CM

## 2021-10-19 NOTE — PROGRESS NOTES
M/L Kip Peak, CMA, 10/19/21, 1:23 PM    Patient held Warfarin due to surgery. Stopped 10/9-10/13. Restarted 10/14 10mg Fri/Sat/Sun/Mon.  7.5mg Tues 10/19. Please advise.   Kip Peak, CMA, 10/19/21, 2:25 PM

## 2021-10-19 NOTE — TELEPHONE ENCOUNTER
----- Message from Karlee Callahan MD sent at 10/19/2021 12:08 PM CDT -----  INR is 1.10. Impression: INR is not within the expected range.

## 2021-10-19 NOTE — PROGRESS NOTES
M/L Renuka Allan CMA, 10/19/21, 3:31 PM    Patient informed of below. Expressed understanding. Appt given. Renuka Allan CMA, 10/19/21, 3:58 PM    Future appt:     Your appointments     Date & Time Appointment Department [facility])    Oct 22, 2021 10:15 A

## 2021-10-22 ENCOUNTER — LAB ENCOUNTER (OUTPATIENT)
Dept: LAB | Age: 66
End: 2021-10-22
Attending: FAMILY MEDICINE
Payer: COMMERCIAL

## 2021-10-22 DIAGNOSIS — Z79.01 LONG TERM (CURRENT) USE OF ANTICOAGULANTS: ICD-10-CM

## 2021-10-22 PROCEDURE — 85610 PROTHROMBIN TIME: CPT

## 2021-10-22 PROCEDURE — 36415 COLL VENOUS BLD VENIPUNCTURE: CPT

## 2021-10-23 ENCOUNTER — ANTI-COAG VISIT (OUTPATIENT)
Dept: FAMILY MEDICINE CLINIC | Facility: CLINIC | Age: 66
End: 2021-10-23

## 2021-10-23 ENCOUNTER — TELEPHONE (OUTPATIENT)
Dept: FAMILY MEDICINE CLINIC | Facility: CLINIC | Age: 66
End: 2021-10-23

## 2021-10-23 DIAGNOSIS — I48.21 PERMANENT ATRIAL FIBRILLATION (HCC): ICD-10-CM

## 2021-10-23 DIAGNOSIS — Z79.01 LONG TERM (CURRENT) USE OF ANTICOAGULANTS: ICD-10-CM

## 2021-10-23 NOTE — PROGRESS NOTES
Message from Sonja Tong MD sent at 10/23/2021  8:58 AM CDT -----  INR is up to 1.60. Please continue to take the normal dose of warfarin. Recheck INR again in 1 week.       Patient informed of above. Expressed understanding. Appt scheduled.   Juan Barrios

## 2021-10-23 NOTE — TELEPHONE ENCOUNTER
----- Message from Jayla Robles MD sent at 10/23/2021  8:58 AM CDT -----  INR is up to 1.60. Please continue to take the normal dose of warfarin. Recheck INR again in 1 week.

## 2021-10-29 ENCOUNTER — LAB ENCOUNTER (OUTPATIENT)
Dept: LAB | Age: 66
End: 2021-10-29
Attending: FAMILY MEDICINE
Payer: COMMERCIAL

## 2021-10-29 DIAGNOSIS — Z79.01 LONG TERM (CURRENT) USE OF ANTICOAGULANTS: ICD-10-CM

## 2021-10-29 PROCEDURE — 36415 COLL VENOUS BLD VENIPUNCTURE: CPT

## 2021-10-29 PROCEDURE — 85610 PROTHROMBIN TIME: CPT

## 2021-10-30 ENCOUNTER — ANTI-COAG VISIT (OUTPATIENT)
Dept: FAMILY MEDICINE CLINIC | Facility: CLINIC | Age: 66
End: 2021-10-30

## 2021-10-30 DIAGNOSIS — Z79.01 LONG TERM (CURRENT) USE OF ANTICOAGULANTS: ICD-10-CM

## 2021-10-30 DIAGNOSIS — I48.21 PERMANENT ATRIAL FIBRILLATION (HCC): ICD-10-CM

## 2021-10-30 NOTE — PROGRESS NOTES
Patient informed of the below results and recommendations. Lab appt scheduled.      Future Appointments   Date Time Provider Anayeli Harrell   11/12/2021  9:30 AM REF [place] REF [facility]   11/22/2021  8:15 AM REF [place] REF [facility] Ref Sy[facility]   1

## 2021-10-30 NOTE — PROGRESS NOTES
INR 1.99  PT 23.0    Current Coumadin Dose:  10mg M,W, F and 7.5mg daily the rest of the week. Please advise.

## 2021-10-30 NOTE — PROGRESS NOTES
Continue current treatment, very minimally out of range of 2-3 for INR. Continue current treatment, repeat in 2 weeks.

## 2021-11-12 ENCOUNTER — LAB ENCOUNTER (OUTPATIENT)
Dept: LAB | Age: 66
End: 2021-11-12
Attending: FAMILY MEDICINE
Payer: COMMERCIAL

## 2021-11-12 DIAGNOSIS — Z79.01 LONG TERM (CURRENT) USE OF ANTICOAGULANTS: ICD-10-CM

## 2021-11-12 PROCEDURE — 36415 COLL VENOUS BLD VENIPUNCTURE: CPT

## 2021-11-12 PROCEDURE — 85610 PROTHROMBIN TIME: CPT

## 2021-11-12 RX ORDER — ROSUVASTATIN CALCIUM 10 MG/1
TABLET, COATED ORAL
Qty: 90 TABLET | Refills: 1 | Status: SHIPPED | OUTPATIENT
Start: 2021-11-12

## 2021-11-12 NOTE — TELEPHONE ENCOUNTER
Future appt:     Your appointments     Date & Time Appointment Department [facility])    Nov 12, 2021  9:30 AM CST Laboratory Visit with CHERELLE Tyson Reference Lab ([facility] Ref Lab [facility])        Nov 22, 2021  8:15 AM CST Laboratory Visit with Camille Cox

## 2021-11-13 ENCOUNTER — ANTI-COAG VISIT (OUTPATIENT)
Dept: FAMILY MEDICINE CLINIC | Facility: CLINIC | Age: 66
End: 2021-11-13

## 2021-11-13 DIAGNOSIS — Z79.01 LONG TERM (CURRENT) USE OF ANTICOAGULANTS: ICD-10-CM

## 2021-11-13 DIAGNOSIS — I48.21 PERMANENT ATRIAL FIBRILLATION (HCC): ICD-10-CM

## 2021-11-13 NOTE — PROGRESS NOTES
INR 2.04    Current Coumadin Dose: 10mg M,W, F and 7.5mg daily the rest of the week     Please advise.

## 2021-11-17 RX ORDER — WARFARIN SODIUM 5 MG/1
TABLET ORAL
Qty: 144 TABLET | Refills: 1 | Status: SHIPPED | OUTPATIENT
Start: 2021-11-17 | End: 2022-01-28

## 2021-11-17 NOTE — TELEPHONE ENCOUNTER
Future appt:     Your appointments     Date & Time Appointment Department [facility])    Nov 22, 2021  8:15 AM CST Laboratory Visit with REF Ilan Nixon Reference Lab (ED[facility] Ref Lab [facility])        Nov 29, 2021 10:30 AM CST Follow up - Extended with [facility]

## 2021-11-22 ENCOUNTER — LAB ENCOUNTER (OUTPATIENT)
Dept: LAB | Age: 66
End: 2021-11-22
Attending: FAMILY MEDICINE
Payer: COMMERCIAL

## 2021-11-22 DIAGNOSIS — E11.22 CONTROLLED TYPE 2 DIABETES MELLITUS WITH STAGE 2 CHRONIC KIDNEY DISEASE, WITH LONG-TERM CURRENT USE OF INSULIN (HCC): ICD-10-CM

## 2021-11-22 DIAGNOSIS — N18.2 CONTROLLED TYPE 2 DIABETES MELLITUS WITH STAGE 2 CHRONIC KIDNEY DISEASE, WITH LONG-TERM CURRENT USE OF INSULIN (HCC): ICD-10-CM

## 2021-11-22 DIAGNOSIS — E78.49 FAMILIAL MULTIPLE LIPOPROTEIN-TYPE HYPERLIPIDEMIA: ICD-10-CM

## 2021-11-22 DIAGNOSIS — I10 BENIGN ESSENTIAL HYPERTENSION: ICD-10-CM

## 2021-11-22 DIAGNOSIS — Z79.4 CONTROLLED TYPE 2 DIABETES MELLITUS WITH STAGE 2 CHRONIC KIDNEY DISEASE, WITH LONG-TERM CURRENT USE OF INSULIN (HCC): ICD-10-CM

## 2021-11-22 PROCEDURE — 80053 COMPREHEN METABOLIC PANEL: CPT

## 2021-11-22 PROCEDURE — 3044F HG A1C LEVEL LT 7.0%: CPT | Performed by: FAMILY MEDICINE

## 2021-11-22 PROCEDURE — 84443 ASSAY THYROID STIM HORMONE: CPT

## 2021-11-22 PROCEDURE — 83036 HEMOGLOBIN GLYCOSYLATED A1C: CPT

## 2021-11-22 PROCEDURE — 82043 UR ALBUMIN QUANTITATIVE: CPT

## 2021-11-22 PROCEDURE — 80061 LIPID PANEL: CPT

## 2021-11-22 PROCEDURE — 84550 ASSAY OF BLOOD/URIC ACID: CPT

## 2021-11-22 PROCEDURE — 85025 COMPLETE CBC W/AUTO DIFF WBC: CPT

## 2021-11-22 PROCEDURE — 82570 ASSAY OF URINE CREATININE: CPT

## 2021-11-22 PROCEDURE — 36415 COLL VENOUS BLD VENIPUNCTURE: CPT

## 2021-11-22 PROCEDURE — 3061F NEG MICROALBUMINURIA REV: CPT | Performed by: FAMILY MEDICINE

## 2021-11-24 ENCOUNTER — TELEPHONE (OUTPATIENT)
Dept: FAMILY MEDICINE CLINIC | Facility: CLINIC | Age: 66
End: 2021-11-24

## 2021-11-24 RX ORDER — DILTIAZEM HYDROCHLORIDE 180 MG/1
CAPSULE, COATED, EXTENDED RELEASE ORAL
Qty: 90 CAPSULE | Refills: 1 | Status: SHIPPED | OUTPATIENT
Start: 2021-11-24

## 2021-11-24 NOTE — TELEPHONE ENCOUNTER
----- Message from MICHELLE Branham sent at 11/23/2021 11:07 AM CST -----  Dr. Adalid Ivy notify patient that his cholesterol has improved, triglycerides have improved but are still high, A1c has improved down to 6. 8. Other labs are esse

## 2021-11-24 NOTE — TELEPHONE ENCOUNTER
Future appt:     Your appointments     Date & Time Appointment Department [facility])    Nov 29, 2021 10:30 AM CST Follow up - Extended with Kelly Knight MD [address] [place] [address])        Dec 03, 2021

## 2021-11-29 ENCOUNTER — OFFICE VISIT (OUTPATIENT)
Dept: FAMILY MEDICINE CLINIC | Facility: CLINIC | Age: 66
End: 2021-11-29
Payer: COMMERCIAL

## 2021-11-29 ENCOUNTER — TELEPHONE (OUTPATIENT)
Dept: FAMILY MEDICINE CLINIC | Facility: CLINIC | Age: 66
End: 2021-11-29

## 2021-11-29 VITALS
RESPIRATION RATE: 18 BRPM | SYSTOLIC BLOOD PRESSURE: 120 MMHG | DIASTOLIC BLOOD PRESSURE: 82 MMHG | WEIGHT: 273 LBS | TEMPERATURE: 99 F | HEART RATE: 81 BPM | OXYGEN SATURATION: 96 % | HEIGHT: 70 IN | BODY MASS INDEX: 39.08 KG/M2

## 2021-11-29 DIAGNOSIS — Z79.4 CONTROLLED TYPE 2 DIABETES MELLITUS WITH STAGE 2 CHRONIC KIDNEY DISEASE, WITH LONG-TERM CURRENT USE OF INSULIN (HCC): Primary | ICD-10-CM

## 2021-11-29 DIAGNOSIS — F33.1 DEPRESSION, MAJOR, RECURRENT, MODERATE (HCC): ICD-10-CM

## 2021-11-29 DIAGNOSIS — I25.10 CORONARY ARTERY DISEASE INVOLVING NATIVE CORONARY ARTERY OF NATIVE HEART WITHOUT ANGINA PECTORIS: ICD-10-CM

## 2021-11-29 DIAGNOSIS — E11.22 CONTROLLED TYPE 2 DIABETES MELLITUS WITH STAGE 2 CHRONIC KIDNEY DISEASE, WITH LONG-TERM CURRENT USE OF INSULIN (HCC): Primary | ICD-10-CM

## 2021-11-29 DIAGNOSIS — I10 BENIGN ESSENTIAL HYPERTENSION: ICD-10-CM

## 2021-11-29 DIAGNOSIS — N18.2 CONTROLLED TYPE 2 DIABETES MELLITUS WITH STAGE 2 CHRONIC KIDNEY DISEASE, WITH LONG-TERM CURRENT USE OF INSULIN (HCC): Primary | ICD-10-CM

## 2021-11-29 DIAGNOSIS — K21.9 GASTROESOPHAGEAL REFLUX DISEASE WITHOUT ESOPHAGITIS: ICD-10-CM

## 2021-11-29 DIAGNOSIS — Z79.01 LONG TERM (CURRENT) USE OF ANTICOAGULANTS: ICD-10-CM

## 2021-11-29 DIAGNOSIS — E66.01 CLASS 2 SEVERE OBESITY DUE TO EXCESS CALORIES WITH SERIOUS COMORBIDITY AND BODY MASS INDEX (BMI) OF 35.0 TO 35.9 IN ADULT (HCC): ICD-10-CM

## 2021-11-29 DIAGNOSIS — G47.61 PERIODIC LIMB MOVEMENT DISORDER: ICD-10-CM

## 2021-11-29 PROBLEM — R52 UNCONTROLLED PAIN: Status: ACTIVE | Noted: 2021-10-14

## 2021-11-29 PROBLEM — S76.111A QUADRICEPS TENDON RUPTURE, RIGHT, INITIAL ENCOUNTER: Status: RESOLVED | Noted: 2021-10-11 | Resolved: 2021-11-29

## 2021-11-29 PROBLEM — R52 UNCONTROLLED PAIN: Status: RESOLVED | Noted: 2021-10-14 | Resolved: 2021-11-29

## 2021-11-29 PROCEDURE — 3079F DIAST BP 80-89 MM HG: CPT | Performed by: FAMILY MEDICINE

## 2021-11-29 PROCEDURE — 99214 OFFICE O/P EST MOD 30 MIN: CPT | Performed by: FAMILY MEDICINE

## 2021-11-29 PROCEDURE — 3074F SYST BP LT 130 MM HG: CPT | Performed by: FAMILY MEDICINE

## 2021-11-29 PROCEDURE — 3008F BODY MASS INDEX DOCD: CPT | Performed by: FAMILY MEDICINE

## 2021-11-29 NOTE — PROGRESS NOTES
[address]  PROGRESS NOTE  Chief Complaint:   Patient presents with: Follow - Up      HPI:   This is a 61year old male coming in for follow-up on his knees. He reports that he is only doing his insulin injections once a day now.   He 74 <100 mg/dL    VLDL 53 (H) 0 - 30 mg/dL    Non HDL Chol 129 <130 mg/dL    FASTING Yes    PSA SCREEN   Result Value Ref Range    Prostate Specific Antigen Screen 0.55 <=4.00 ng/mL   ASSAY, THYROID STIM HORMONE   Result Value Ref Range    TSH 2.350 0.358 - History:  Social History    Tobacco Use      Smoking status: Never Smoker      Smokeless tobacco: Never Used    Vaping Use      Vaping Use: Never used    Alcohol use: No      Alcohol/week: 0.0 standard drinks    Drug use: No    Family History:  Family Hist TOTAL) BY MOUTH ONCE DAILY. 90 capsule 1   • Blood Glucose Monitoring Suppl (ONETOUCH ULTRA 2) w/Device Does not apply Kit OneTouch Ultra or Ultra 2 Glucometer. 2x daily glucose testing.   Dx: E11.9 1 kit 0   • Glucose Blood In Vitro Strip One Touch Ultra Position: Sitting, Cuff Size: large)   Pulse 81   Temp 98.7 °F (37.1 °C) (Temporal)   Resp 18   Ht 5' 10\" (1.778 m)   Wt 273 lb (123.8 kg)   SpO2 96%   BMI 39.17 kg/m²  Estimated body mass index is 39.17 kg/m² as calculated from the following:    Height a HEMOGLOBIN A1C; Future    2. Coronary artery disease involving native coronary artery of native heart without angina pectoris  He has coronary artery disease but no heart related symptoms now. - COMP METABOLIC PANEL (14);  Future  - HEMOGLOBIN A1C; Future Controlled type 2 diabetes mellitus with stage 2 chronic kidney disease, with long-term current use of insulin ([facility] [address]     Esophageal reflux     Routine general medical examination at a health care facility     Familial multiple lipoprotein-type hyperlipidem

## 2021-11-29 NOTE — TELEPHONE ENCOUNTER
Pt states that he is having trouble getting his Diltiazem, states that they want him to pay. Would like a call back.

## 2021-11-29 NOTE — TELEPHONE ENCOUNTER
[facility]oned [facility].  They have now run the medication through insurance. Patient can . Patient informed. He will  rx.

## 2021-11-30 RX ORDER — HUMAN INSULIN 100 [IU]/ML
INJECTION, SUSPENSION SUBCUTANEOUS
Qty: 45 ML | Refills: 1 | Status: SHIPPED | OUTPATIENT
Start: 2021-11-30

## 2021-11-30 NOTE — TELEPHONE ENCOUNTER
Future appt:     Your appointments     Date & Time Appointment Department [facility])    Dec 06, 2021 10:00 AM CST Laboratory Visit with REF Amaris Alvarado Reference Lab (EDW Ref Lab [facility])        Feb 17, 2022  9:30 AM CST Laboratory Visit with Juan J Vargas

## 2021-12-06 ENCOUNTER — LAB ENCOUNTER (OUTPATIENT)
Dept: LAB | Age: 66
End: 2021-12-06
Attending: FAMILY MEDICINE
Payer: COMMERCIAL

## 2021-12-06 ENCOUNTER — ANTI-COAG VISIT (OUTPATIENT)
Dept: FAMILY MEDICINE CLINIC | Facility: CLINIC | Age: 66
End: 2021-12-06

## 2021-12-06 DIAGNOSIS — Z79.01 LONG TERM (CURRENT) USE OF ANTICOAGULANTS: ICD-10-CM

## 2021-12-06 DIAGNOSIS — I48.21 PERMANENT ATRIAL FIBRILLATION (HCC): ICD-10-CM

## 2021-12-06 PROCEDURE — 93793 ANTICOAG MGMT PT WARFARIN: CPT | Performed by: FAMILY MEDICINE

## 2021-12-06 PROCEDURE — 85610 PROTHROMBIN TIME: CPT

## 2021-12-06 PROCEDURE — 36415 COLL VENOUS BLD VENIPUNCTURE: CPT

## 2021-12-06 NOTE — PROGRESS NOTES
Hansel Leventhal, MD  [facility]  INR is 2.22 which is in the acceptable range.  Please continue the same dose of warfarin.  Recheck again in 1 month. Patient informed of above. Expressed understanding.   BRIGITTE Jackson [facility], [address] [facility], 12/06/21

## 2022-01-17 ENCOUNTER — LAB ENCOUNTER (OUTPATIENT)
Dept: LAB | Age: 67
End: 2022-01-17
Attending: FAMILY MEDICINE
Payer: MEDICARE

## 2022-01-17 DIAGNOSIS — Z79.01 LONG TERM (CURRENT) USE OF ANTICOAGULANTS: ICD-10-CM

## 2022-01-17 LAB
INR BLD: 2.1 (ref 0.8–1.2)
PROTHROMBIN TIME: 23.7 SECONDS (ref 11.6–14.8)

## 2022-01-17 PROCEDURE — 36415 COLL VENOUS BLD VENIPUNCTURE: CPT

## 2022-01-17 PROCEDURE — 85610 PROTHROMBIN TIME: CPT

## 2022-01-18 ENCOUNTER — ANTI-COAG VISIT (OUTPATIENT)
Dept: FAMILY MEDICINE CLINIC | Facility: CLINIC | Age: 67
End: 2022-01-18

## 2022-01-18 DIAGNOSIS — Z79.01 LONG TERM (CURRENT) USE OF ANTICOAGULANTS: ICD-10-CM

## 2022-01-18 DIAGNOSIS — I48.21 PERMANENT ATRIAL FIBRILLATION (HCC): ICD-10-CM

## 2022-01-18 NOTE — PROGRESS NOTES
MD Katarina Garcia Nurse Pool  INR is 2.10 which is in the acceptable range. Impression: INR is acceptable. Plan: Continue the same dose of warfarin.  Recheck in 1 month.      KARLI Navarro [facility], 01/18/22, 10:28 AM    Patient info

## 2022-01-27 ENCOUNTER — TELEPHONE (OUTPATIENT)
Dept: FAMILY MEDICINE CLINIC | Facility: CLINIC | Age: 67
End: 2022-01-27

## 2022-01-27 NOTE — TELEPHONE ENCOUNTER
Would like to discuss other blood thinner options that will work with ins. Informed pt dr out of office today. Pt states he can wait till tomorrow for call back.

## 2022-01-28 NOTE — TELEPHONE ENCOUNTER
Patient states Eliquis is too costly. Requesting change to Uriel.   Denver Pierce, [address] [facility], 01/28/22, 3:40 PM

## 2022-01-28 NOTE — TELEPHONE ENCOUNTER
We will remove the Eliquis and switch to Xarelto. The dose of Xarelto is 20 mg daily. Again, take the first dose on the day after the last dose of warfarin.

## 2022-01-28 NOTE — TELEPHONE ENCOUNTER
I will send in a prescription for Eliquis to the pharmacy. Please start taking Eliquis 5 mg twice a day. The day that you start taking the Eliquis, stop taking the warfarin. We will continue to use Eliquis going forward and not warfarin.   Once the [facility]

## 2022-01-28 NOTE — TELEPHONE ENCOUNTER
Please advise if patient can change to Eliquis. Now on Medicare.   Asael Barrientos CMA, 01/28/22, 9:25 AM

## 2022-01-31 ENCOUNTER — TELEPHONE (OUTPATIENT)
Dept: FAMILY MEDICINE CLINIC | Facility: CLINIC | Age: 67
End: 2022-01-31

## 2022-01-31 NOTE — TELEPHONE ENCOUNTER
Xarelto Rx not received by [facility]. Please resend. Call patient only with questions or problems.

## 2022-02-10 RX ORDER — LISINOPRIL 10 MG/1
TABLET ORAL
Qty: 90 TABLET | Refills: 1 | Status: SHIPPED | OUTPATIENT
Start: 2022-02-10

## 2022-02-10 NOTE — TELEPHONE ENCOUNTER
Future appt: Your appointments     Date & Time Appointment Department [facility])    Feb 17, 2022  9:30 AM CST Laboratory Visit with REF Marta Silveira Reference Lab (EDW Ref Lab [facility])        Feb 21, 2022 11:15 AM CST Medicare Annual Well Visit with Kerry Klein MD 25 [facility] ([place])            25 [facility]  [address] [phone]  [address] [facility] Reference Lab  EDW Ref Lab [place]  [phone]  [phone]        Last Appointment with provider:   11/29/2021  Last appointment at [facility] [place]:  11/29/2021  Cholesterol, Total (mg/dL)   Date Value   11/22/2021 163     HDL Cholesterol (mg/dL)   Date Value   11/22/2021 34 (L)     LDL Cholesterol (mg/dL)   Date Value   11/22/2021 74     Triglycerides (mg/dL)   Date Value   11/22/2021 340 (H)     Lab Results   Component Value Date     (H) 11/22/2021    A1C 6.8 (H) 11/22/2021     Lab Results   Component Value Date    T4F 1.0 08/01/2017    TSH 2.350 11/22/2021     Last RF:  5/11/2021    No follow-ups on file.

## 2022-02-16 ENCOUNTER — TELEPHONE (OUTPATIENT)
Dept: FAMILY MEDICINE CLINIC | Facility: CLINIC | Age: 67
End: 2022-02-16

## 2022-02-17 ENCOUNTER — LABORATORY ENCOUNTER (OUTPATIENT)
Dept: LAB | Age: 67
End: 2022-02-17
Attending: FAMILY MEDICINE
Payer: MEDICARE

## 2022-02-17 DIAGNOSIS — Z79.4 CONTROLLED TYPE 2 DIABETES MELLITUS WITH STAGE 2 CHRONIC KIDNEY DISEASE, WITH LONG-TERM CURRENT USE OF INSULIN (HCC): ICD-10-CM

## 2022-02-17 DIAGNOSIS — N18.2 CONTROLLED TYPE 2 DIABETES MELLITUS WITH STAGE 2 CHRONIC KIDNEY DISEASE, WITH LONG-TERM CURRENT USE OF INSULIN (HCC): ICD-10-CM

## 2022-02-17 DIAGNOSIS — I25.10 CORONARY ARTERY DISEASE INVOLVING NATIVE CORONARY ARTERY OF NATIVE HEART WITHOUT ANGINA PECTORIS: ICD-10-CM

## 2022-02-17 DIAGNOSIS — I10 BENIGN ESSENTIAL HYPERTENSION: ICD-10-CM

## 2022-02-17 DIAGNOSIS — E11.22 CONTROLLED TYPE 2 DIABETES MELLITUS WITH STAGE 2 CHRONIC KIDNEY DISEASE, WITH LONG-TERM CURRENT USE OF INSULIN (HCC): ICD-10-CM

## 2022-02-17 LAB
ALBUMIN SERPL-MCNC: 3.7 G/DL (ref 3.4–5)
ALBUMIN/GLOB SERPL: 1.1 {RATIO} (ref 1–2)
ALP LIVER SERPL-CCNC: 89 U/L
ALT SERPL-CCNC: 39 U/L
ANION GAP SERPL CALC-SCNC: 6 MMOL/L (ref 0–18)
AST SERPL-CCNC: 20 U/L (ref 15–37)
BILIRUB SERPL-MCNC: 0.5 MG/DL (ref 0.1–2)
BUN BLD-MCNC: 22 MG/DL (ref 7–18)
CALCIUM BLD-MCNC: 9.2 MG/DL (ref 8.5–10.1)
CHLORIDE SERPL-SCNC: 107 MMOL/L (ref 98–112)
CO2 SERPL-SCNC: 27 MMOL/L (ref 21–32)
CREAT BLD-MCNC: 0.84 MG/DL
EST. AVERAGE GLUCOSE BLD GHB EST-MCNC: 183 MG/DL (ref 68–126)
FASTING STATUS PATIENT QL REPORTED: YES
GLOBULIN PLAS-MCNC: 3.4 G/DL (ref 2.8–4.4)
GLUCOSE BLD-MCNC: 171 MG/DL (ref 70–99)
HBA1C MFR BLD: 8 % (ref ?–5.7)
OSMOLALITY SERPL CALC.SUM OF ELEC: 297 MOSM/KG (ref 275–295)
POTASSIUM SERPL-SCNC: 4 MMOL/L (ref 3.5–5.1)
PROT SERPL-MCNC: 7.1 G/DL (ref 6.4–8.2)
SODIUM SERPL-SCNC: 140 MMOL/L (ref 136–145)

## 2022-02-17 PROCEDURE — 80053 COMPREHEN METABOLIC PANEL: CPT

## 2022-02-17 PROCEDURE — 3052F HG A1C>EQUAL 8.0%<EQUAL 9.0%: CPT | Performed by: FAMILY MEDICINE

## 2022-02-17 PROCEDURE — 83036 HEMOGLOBIN GLYCOSYLATED A1C: CPT

## 2022-02-17 PROCEDURE — 36415 COLL VENOUS BLD VENIPUNCTURE: CPT

## 2022-02-18 ENCOUNTER — TELEPHONE (OUTPATIENT)
Dept: FAMILY MEDICINE CLINIC | Facility: CLINIC | Age: 67
End: 2022-02-18

## 2022-02-18 NOTE — TELEPHONE ENCOUNTER
----- Message from Arnaud Tapia MD sent at 2/18/2022 10:27 AM CST -----  Labs reviewed. Will discuss at office visit.

## 2022-02-21 ENCOUNTER — OFFICE VISIT (OUTPATIENT)
Dept: FAMILY MEDICINE CLINIC | Facility: CLINIC | Age: 67
End: 2022-02-21
Payer: MEDICARE

## 2022-02-21 VITALS
TEMPERATURE: 98 F | DIASTOLIC BLOOD PRESSURE: 70 MMHG | HEIGHT: 72 IN | HEART RATE: 82 BPM | BODY MASS INDEX: 37.79 KG/M2 | WEIGHT: 279 LBS | SYSTOLIC BLOOD PRESSURE: 100 MMHG | RESPIRATION RATE: 18 BRPM | OXYGEN SATURATION: 97 %

## 2022-02-21 DIAGNOSIS — B35.1 DERMATOPHYTOSIS OF NAIL: ICD-10-CM

## 2022-02-21 DIAGNOSIS — Z95.1 HX OF CABG: ICD-10-CM

## 2022-02-21 DIAGNOSIS — I25.10 CORONARY ARTERY DISEASE INVOLVING NATIVE CORONARY ARTERY OF NATIVE HEART WITHOUT ANGINA PECTORIS: ICD-10-CM

## 2022-02-21 DIAGNOSIS — I10 BENIGN ESSENTIAL HYPERTENSION: ICD-10-CM

## 2022-02-21 DIAGNOSIS — G47.61 PERIODIC LIMB MOVEMENT DISORDER: ICD-10-CM

## 2022-02-21 DIAGNOSIS — M25.512 PAIN IN JOINT OF LEFT SHOULDER: ICD-10-CM

## 2022-02-21 DIAGNOSIS — L30.9 DERMATITIS: ICD-10-CM

## 2022-02-21 DIAGNOSIS — F33.1 DEPRESSION, MAJOR, RECURRENT, MODERATE (HCC): ICD-10-CM

## 2022-02-21 DIAGNOSIS — E66.01 CLASS 2 SEVERE OBESITY DUE TO EXCESS CALORIES WITH SERIOUS COMORBIDITY AND BODY MASS INDEX (BMI) OF 37.0 TO 37.9 IN ADULT (HCC): ICD-10-CM

## 2022-02-21 DIAGNOSIS — E11.22 CONTROLLED TYPE 2 DIABETES MELLITUS WITH STAGE 2 CHRONIC KIDNEY DISEASE, WITH LONG-TERM CURRENT USE OF INSULIN (HCC): ICD-10-CM

## 2022-02-21 DIAGNOSIS — Z00.00 ENCOUNTER FOR ANNUAL HEALTH EXAMINATION: Primary | ICD-10-CM

## 2022-02-21 DIAGNOSIS — E78.49 FAMILIAL MULTIPLE LIPOPROTEIN-TYPE HYPERLIPIDEMIA: ICD-10-CM

## 2022-02-21 DIAGNOSIS — Z00.00 WELCOME TO MEDICARE PREVENTIVE VISIT: ICD-10-CM

## 2022-02-21 DIAGNOSIS — Z79.4 CONTROLLED TYPE 2 DIABETES MELLITUS WITH STAGE 2 CHRONIC KIDNEY DISEASE, WITH LONG-TERM CURRENT USE OF INSULIN (HCC): ICD-10-CM

## 2022-02-21 DIAGNOSIS — Z79.01 LONG TERM (CURRENT) USE OF ANTICOAGULANTS: ICD-10-CM

## 2022-02-21 DIAGNOSIS — K21.9 GASTROESOPHAGEAL REFLUX DISEASE WITHOUT ESOPHAGITIS: ICD-10-CM

## 2022-02-21 DIAGNOSIS — L30.4 INTERTRIGO: ICD-10-CM

## 2022-02-21 DIAGNOSIS — N18.2 CONTROLLED TYPE 2 DIABETES MELLITUS WITH STAGE 2 CHRONIC KIDNEY DISEASE, WITH LONG-TERM CURRENT USE OF INSULIN (HCC): ICD-10-CM

## 2022-02-21 DIAGNOSIS — I48.21 PERMANENT ATRIAL FIBRILLATION (HCC): ICD-10-CM

## 2022-02-21 PROCEDURE — 3074F SYST BP LT 130 MM HG: CPT | Performed by: FAMILY MEDICINE

## 2022-02-21 PROCEDURE — 96160 PT-FOCUSED HLTH RISK ASSMT: CPT | Performed by: FAMILY MEDICINE

## 2022-02-21 PROCEDURE — 99397 PER PM REEVAL EST PAT 65+ YR: CPT | Performed by: FAMILY MEDICINE

## 2022-02-21 PROCEDURE — G0402 INITIAL PREVENTIVE EXAM: HCPCS | Performed by: FAMILY MEDICINE

## 2022-02-21 PROCEDURE — 3078F DIAST BP <80 MM HG: CPT | Performed by: FAMILY MEDICINE

## 2022-02-21 PROCEDURE — 3008F BODY MASS INDEX DOCD: CPT | Performed by: FAMILY MEDICINE

## 2022-02-21 PROCEDURE — G0403 EKG FOR INITIAL PREVENT EXAM: HCPCS | Performed by: FAMILY MEDICINE

## 2022-02-21 RX ORDER — HUMAN INSULIN 100 [IU]/ML
40 INJECTION, SUSPENSION SUBCUTANEOUS 2 TIMES DAILY
Qty: 45 ML | Refills: 1 | Status: SHIPPED | OUTPATIENT
Start: 2022-02-21

## 2022-03-17 NOTE — TELEPHONE ENCOUNTER
Future appt: Your appointments     Date & Time Appointment Department [facility])    Sep 02, 2022  9:30 AM CDT Laboratory Visit with REF Eloise Triana Reference Lab (EDW Ref Lab [place])        Sep 06, 2022  1:30 PM CDT Follow Up Visit with Veronica Servin MD 25 [facility] [place] ([facility])            25 [facility] [place]  [address] [phone]  [phone] [facility] Reference Lab  EDW Ref Lab [place]  [phone]  [phone]        Last Appointment with provider:   2/21/2022  [facility] appointment at [facility] [place]:  2/21/2022  Cholesterol, Total (mg/dL)   Date Value   11/22/2021 163     HDL Cholesterol (mg/dL)   Date Value   11/22/2021 34 (L)     LDL Cholesterol (mg/dL)   Date Value   11/22/2021 74     Triglycerides (mg/dL)   Date Value   11/22/2021 340 (H)     Lab Results   Component Value Date     (H) 02/17/2022    A1C 8.0 (H) 02/17/2022     Lab Results   Component Value Date    T4F 1.0 08/01/2017    TSH 2.350 11/22/2021     Last RF:  9/17/2021    No follow-ups on file.

## 2022-03-18 RX ORDER — DAPAGLIFLOZIN 10 MG/1
TABLET, FILM COATED ORAL
Qty: 90 TABLET | Refills: 1 | Status: SHIPPED | OUTPATIENT
Start: 2022-03-18

## 2022-03-18 RX ORDER — SERTRALINE HYDROCHLORIDE 100 MG/1
TABLET, FILM COATED ORAL
Qty: 90 TABLET | Refills: 1 | Status: SHIPPED | OUTPATIENT
Start: 2022-03-18

## 2022-05-03 ENCOUNTER — ANTI-COAG VISIT (OUTPATIENT)
Dept: FAMILY MEDICINE CLINIC | Facility: CLINIC | Age: 67
End: 2022-05-03

## 2022-05-03 ENCOUNTER — TELEPHONE (OUTPATIENT)
Dept: FAMILY MEDICINE CLINIC | Facility: CLINIC | Age: 67
End: 2022-05-03

## 2022-05-03 RX ORDER — WARFARIN SODIUM 5 MG/1
5 TABLET ORAL NIGHTLY
COMMUNITY
End: 2022-05-03

## 2022-05-03 NOTE — TELEPHONE ENCOUNTER
When he restarts warfarin, please start as warfarin 5 mg tablets. He will take 2 tablets (10 mg) daily on Monday Wednesday Friday and 1.5 tablets (7.5 mg) daily the other days. He will need to have an INR checked 1 week after he restarts the warfarin.

## 2022-05-03 NOTE — PROGRESS NOTES
Patient informed of below. Expressed understanding. Will restart Warfarin Thursday 5/5/22.   Reji Hutchison CMA, 05/03/22, 4:20 PM

## 2022-05-03 NOTE — TELEPHONE ENCOUNTER
Patient has reached his donut [facility] with   [facility] Pharm[facility]. The Xarelto is now $150 month. Will need to return to taking Warfarin. 70/30 Insulin is also too costly. Informed per [facility] Pharmacist-  Patient can get the generic 70/30 pens  At [facility]. Patient will check this out. Please advise what dose Warfarin to start on Thursday and when to do INR.

## 2022-05-04 RX ORDER — WARFARIN SODIUM 5 MG/1
TABLET ORAL
Qty: 60 TABLET | Refills: 5 | Status: SHIPPED | OUTPATIENT
Start: 2022-05-04

## 2022-05-05 NOTE — TELEPHONE ENCOUNTER
Future appt:     Your appointments     Date & Time Appointment Department [facility])    Aug 12, 2019  9:00 AM CDT Anti-Coag with EMG [address], [place] ([place])        Sep 14, 2019  8:15 AM CDT L
POST-OP DIAGNOSIS:  Lisfranc dislocation 05-May-2022 18:13:28  Vandana Krishnan

## 2022-05-14 NOTE — TELEPHONE ENCOUNTER
Future appt: Your appointments     Date & Time Appointment Department [facility])    May 16, 2022 11:00 AM CDT Laboratory Visit with REF Borismaico Pichardo Reference Lab (EDW Ref Lab [place])        Sep 02, 2022  9:30 AM CDT Laboratory Visit with REF Borismaico Pichardo Reference Lab (EDW Ref Lab [place])        Sep 06, 2022  1:30 PM CDT Follow Up Visit with Kathren Severe, MD 25 [facility] ([facility])            [address] [place] [address]  [phone] [address] [facility] Reference Lab  EDW Ref Lab [address] [place] [address]  [phone]         Last Appointment with provider:   2/21/2022- advised Return in 6 months (on 8/21/2022). Last appointment at [facility]:  2/21/2022  Cholesterol, Total (mg/dL)   Date Value   11/22/2021 163     HDL Cholesterol (mg/dL)   Date Value   11/22/2021 34 (L)     LDL Cholesterol (mg/dL)   Date Value   11/22/2021 74     Triglycerides (mg/dL)   Date Value   11/22/2021 340 (H)     Lab Results   Component Value Date     (H) 02/17/2022    A1C 8.0 (H) 02/17/2022     Lab Results   Component Value Date    T4F 1.0 08/01/2017    TSH 2.350 11/22/2021       No follow-ups on file.

## 2022-05-16 ENCOUNTER — LABORATORY ENCOUNTER (OUTPATIENT)
Dept: LAB | Age: 67
End: 2022-05-16
Attending: FAMILY MEDICINE
Payer: MEDICARE

## 2022-05-16 DIAGNOSIS — Z79.01 LONG TERM (CURRENT) USE OF ANTICOAGULANTS: ICD-10-CM

## 2022-05-16 LAB
INR BLD: 1.75 (ref 0.8–1.2)
PROTHROMBIN TIME: 20.5 SECONDS (ref 11.6–14.8)

## 2022-05-16 PROCEDURE — 36415 COLL VENOUS BLD VENIPUNCTURE: CPT

## 2022-05-16 PROCEDURE — 85610 PROTHROMBIN TIME: CPT

## 2022-05-16 RX ORDER — ROSUVASTATIN CALCIUM 10 MG/1
TABLET, COATED ORAL
Qty: 90 TABLET | Refills: 1 | Status: SHIPPED | OUTPATIENT
Start: 2022-05-16

## 2022-05-17 ENCOUNTER — ANTI-COAG VISIT (OUTPATIENT)
Dept: FAMILY MEDICINE CLINIC | Facility: CLINIC | Age: 67
End: 2022-05-17

## 2022-05-17 DIAGNOSIS — I48.21 PERMANENT ATRIAL FIBRILLATION (HCC): ICD-10-CM

## 2022-05-17 DIAGNOSIS — Z79.01 LONG TERM (CURRENT) USE OF ANTICOAGULANTS: ICD-10-CM

## 2022-05-17 NOTE — PROGRESS NOTES
INR is low at 1.75. This is below the recommended level. Plan: Please take 10 mg today (Tuesday). Then resume normal warfarin schedule which is 10 mg every Monday Wednesday Friday; 7.5 mg all other days. Repeat INR in 2 weeks.

## 2022-05-25 RX ORDER — DILTIAZEM HYDROCHLORIDE 180 MG/1
CAPSULE, COATED, EXTENDED RELEASE ORAL
Qty: 90 CAPSULE | Refills: 1 | Status: SHIPPED | OUTPATIENT
Start: 2022-05-25

## 2022-05-25 NOTE — TELEPHONE ENCOUNTER
Diltiazem: 11/24/21    Future appt: Your appointments     Date & Time Appointment Department [facility])    Sep 02, 2022  9:30 AM CDT Laboratory Visit with REF Estefanía Gonzalez Reference Lab (EDW Ref Lab [place])        Sep 06, 2022  1:30 PM CDT Follow Up Visit with Matt Valerio MD 25 [facility] [place] ([facility])            25 [facility]  [address] [phone]  [phone] [facility] Reference Lab  EDW Ref Lab [place]  [address] [address]  [phone]        Last Appointment with provider:   2/21/2022  Last appointment at [facility]:  2/21/2022  Cholesterol, Total (mg/dL)   Date Value   11/22/2021 163     HDL Cholesterol (mg/dL)   Date Value   11/22/2021 34 (L)     LDL Cholesterol (mg/dL)   Date Value   11/22/2021 74     Triglycerides (mg/dL)   Date Value   11/22/2021 340 (H)     Lab Results   Component Value Date     (H) 02/17/2022    A1C 8.0 (H) 02/17/2022     Lab Results   Component Value Date    T4F 1.0 08/01/2017    TSH 2.350 11/22/2021       No follow-ups on file.

## 2022-06-13 ENCOUNTER — LABORATORY ENCOUNTER (OUTPATIENT)
Dept: LAB | Age: 67
End: 2022-06-13
Attending: FAMILY MEDICINE
Payer: MEDICARE

## 2022-06-13 DIAGNOSIS — Z79.01 LONG TERM (CURRENT) USE OF ANTICOAGULANTS: ICD-10-CM

## 2022-06-13 LAB
INR BLD: 1.77 (ref 0.8–1.2)
PROTHROMBIN TIME: 20.7 SECONDS (ref 11.6–14.8)

## 2022-06-13 PROCEDURE — 85610 PROTHROMBIN TIME: CPT

## 2022-06-13 PROCEDURE — 36415 COLL VENOUS BLD VENIPUNCTURE: CPT

## 2022-06-14 ENCOUNTER — ANTI-COAG VISIT (OUTPATIENT)
Dept: FAMILY MEDICINE CLINIC | Facility: CLINIC | Age: 67
End: 2022-06-14

## 2022-06-14 DIAGNOSIS — I48.21 PERMANENT ATRIAL FIBRILLATION (HCC): ICD-10-CM

## 2022-06-14 DIAGNOSIS — Z79.01 LONG TERM (CURRENT) USE OF ANTICOAGULANTS: ICD-10-CM

## 2022-06-14 LAB — INR: 1.77 (ref 0.8–1.2)

## 2022-06-14 NOTE — PROGRESS NOTES
Patient informed of INR Instructions. 4 week appointment given.   Francisco Pierce CMA, 06/14/22, 10:01 AM

## 2022-06-20 ENCOUNTER — TELEPHONE (OUTPATIENT)
Dept: FAMILY MEDICINE CLINIC | Facility: CLINIC | Age: 67
End: 2022-06-20

## 2022-06-20 NOTE — TELEPHONE ENCOUNTER
Patient informed of below. Expressed understanding.   Vilma Florez [facility], 06/20/22, 3:26 PM

## 2022-06-20 NOTE — TELEPHONE ENCOUNTER
Please finish the current prescription of Genevaezequiel Hamm. When it is done, there is not an another prescription that I can send in that will work as well that will be less expensive. Please stop the Geneva Kinds and continue just with metformin and insulin. This may mean that we need to increase the dose of the insulin to get the blood sugars down.

## 2022-07-01 ENCOUNTER — TELEPHONE (OUTPATIENT)
Dept: FAMILY MEDICINE CLINIC | Facility: CLINIC | Age: 67
End: 2022-07-01

## 2022-07-01 NOTE — TELEPHONE ENCOUNTER
Patient states he did request Diabetes Supples  From [facility].   Galo Moreno CMA, 07/01/22, 2:49 PM

## 2022-07-05 NOTE — TELEPHONE ENCOUNTER
Metformin: 10/13/21    Future appt: Your appointments     Date & Time Appointment Department [facility])    Jul 11, 2022 11:00 AM CDT Laboratory Visit with REF Booker Mckeon Reference Lab (EDW Ref Lab [place])        Sep 02, 2022  9:30 AM CDT Laboratory Visit with REF Booker Mckeon Reference Lab (EDW Ref Lab [place])        Sep 06, 2022  1:30 PM CDT Follow Up Visit with Little Hernandez MD 25 [facility] ([facility] [place])            25 [facility] [place]  [phone]  [phone] [place] Reference Lab  EDW Ref Lab [place]  [phone]  [phone]        Last Appointment with provider:   2/21/2022  Last appointment at [facility] [place]:  2/21/2022  Cholesterol, Total (mg/dL)   Date Value   11/22/2021 163     HDL Cholesterol (mg/dL)   Date Value   11/22/2021 34 (L)     LDL Cholesterol (mg/dL)   Date Value   11/22/2021 74     Triglycerides (mg/dL)   Date Value   11/22/2021 340 (H)     Lab Results   Component Value Date     (H) 02/17/2022    A1C 8.0 (H) 02/17/2022     Lab Results   Component Value Date    T4F 1.0 08/01/2017    TSH 2.350 11/22/2021       No follow-ups on file.

## 2022-07-07 ENCOUNTER — TELEPHONE (OUTPATIENT)
Dept: FAMILY MEDICINE CLINIC | Facility: CLINIC | Age: 67
End: 2022-07-07

## 2022-07-07 NOTE — TELEPHONE ENCOUNTER
Patient dropped off paperwork to complete for [employer] regarding prescriptions. Called to patient. States Dr. Danny Henley told him once he finished what he had left of the Filippo Stammer at home he could go ahead and stop the medication because it was too expensive for him. Patient states he \"got a hold of [employer]\" and would like to have the paperwork completed so they will help pay for the medication. Patient states he would like to continue taking it. Paperwork placed in Hanane's \"IN\" bin for review/completion. Will also need to fax a paper copy of the script along with the paperwork. Please advise.

## 2022-07-11 ENCOUNTER — LAB ENCOUNTER (OUTPATIENT)
Dept: LAB | Age: 67
End: 2022-07-11
Attending: FAMILY MEDICINE
Payer: MEDICARE

## 2022-07-11 DIAGNOSIS — Z79.01 LONG TERM (CURRENT) USE OF ANTICOAGULANTS: ICD-10-CM

## 2022-07-11 LAB
INR BLD: 1.99 (ref 0.8–1.2)
PROTHROMBIN TIME: 22.7 SECONDS (ref 11.6–14.8)

## 2022-07-11 PROCEDURE — 85610 PROTHROMBIN TIME: CPT

## 2022-07-11 PROCEDURE — 36415 COLL VENOUS BLD VENIPUNCTURE: CPT

## 2022-07-12 ENCOUNTER — ANTI-COAG VISIT (OUTPATIENT)
Dept: FAMILY MEDICINE CLINIC | Facility: CLINIC | Age: 67
End: 2022-07-12

## 2022-07-12 DIAGNOSIS — I48.21 PERMANENT ATRIAL FIBRILLATION (HCC): ICD-10-CM

## 2022-07-12 DIAGNOSIS — Z79.01 LONG TERM (CURRENT) USE OF ANTICOAGULANTS: ICD-10-CM

## 2022-07-12 NOTE — PROGRESS NOTES
Patient informed of below. Expressed understanding. INR Appt given. Asael Barrientos CMA, 07/12/22, 12:56 PM    Future appt: Your appointments     Date & Time Appointment Department [facility])    Jul 25, 2022 10:30 AM CDT Laboratory Visit with REF Baldomero [place] Reference Lab (EDW Ref Lab [place])        Sep 02, 2022  9:30 AM CDT Laboratory Visit with REF Baldomero [place] Reference Lab (EDW Ref Lab [place])        Sep 06, 2022  1:30 PM CDT Follow Up Visit with Ingris Dave MD 25 [facility] ([facility])            25 [facility], [address] [place] [address] [address] [place]  [phone]  [phone] [facility] Reference Lab  EDW Ref Lab [place]  [address] [address]  [phone]        Last Appointment with provider:   Visit date not found  Last appointment at [facility] [place]:  2/21/2022  Cholesterol, Total (mg/dL)   Date Value   11/22/2021 163     HDL Cholesterol (mg/dL)   Date Value   11/22/2021 34 (L)     LDL Cholesterol (mg/dL)   Date Value   11/22/2021 74     Triglycerides (mg/dL)   Date Value   11/22/2021 340 (H)     Lab Results   Component Value Date     (H) 02/17/2022    A1C 8.0 (H) 02/17/2022     Lab Results   Component Value Date    T4F 1.0 08/01/2017    TSH 2.350 11/22/2021       No follow-ups on file.

## 2022-07-15 ENCOUNTER — TELEPHONE (OUTPATIENT)
Dept: FAMILY MEDICINE CLINIC | Facility: CLINIC | Age: 67
End: 2022-07-15

## 2022-07-15 RX ORDER — ISOPROPYL ALCOHOL 0.7 ML/1
1 SWAB TOPICAL 2 TIMES DAILY
Qty: 100 EACH | Refills: 5 | Status: SHIPPED | OUTPATIENT
Start: 2022-07-15

## 2022-07-15 RX ORDER — PEN NEEDLE, DIABETIC 32GX 5/32"
1 NEEDLE, DISPOSABLE MISCELLANEOUS 2 TIMES DAILY
Qty: 200 EACH | Refills: 1 | Status: SHIPPED | OUTPATIENT
Start: 2022-07-15

## 2022-07-15 RX ORDER — PEN NEEDLE, DIABETIC 29 G X1/2"
1 NEEDLE, DISPOSABLE MISCELLANEOUS 2 TIMES DAILY
Qty: 100 EACH | Refills: 5 | Status: SHIPPED | OUTPATIENT
Start: 2022-07-15 | End: 2023-07-15

## 2022-07-15 NOTE — TELEPHONE ENCOUNTER
Received fax from pharmacy for re-order of diabetic supplies.  Checked with pt verified that he is using this new pharmacy for diabetic supplies

## 2022-07-15 NOTE — TELEPHONE ENCOUNTER
States that the Rx they received fot pts injections does not state how many times pt should use it. Please fax new Rx to [phone].

## 2022-07-15 NOTE — TELEPHONE ENCOUNTER
Last appt: 2/21/22 advised Return in 6 months (on 8/21/2022). Future appt: Your appointments     Date & Time Appointment Department [facility])    Jul 25, 2022 10:30 AM CDT Laboratory Visit with REF Thang Nails Reference Lab (EDW Ref Lab [place])        Sep 02, 2022  9:30 AM CDT Laboratory Visit with REF Thang Nails Reference Lab (EDW Ref Lab [place])        Sep 06, 2022  1:30 PM CDT Follow Up Visit with Katelin Pond MD 25 [facility] ([facility])            25 [facility] [place]  [address] [phone]  [phone] [place] Barnett Reference Lab  ED Ref Lab [place]  [address] [address]  [phone]        Last Appointment with provider:   2/21/2022  Last appointment at [facility] [place]:  2/21/2022  Cholesterol, Total (mg/dL)   Date Value   11/22/2021 163     HDL Cholesterol (mg/dL)   Date Value   11/22/2021 34 (L)     LDL Cholesterol (mg/dL)   Date Value   11/22/2021 74     Triglycerides (mg/dL)   Date Value   11/22/2021 340 (H)     Lab Results   Component Value Date     (H) 02/17/2022    A1C 8.0 (H) 02/17/2022     Lab Results   Component Value Date    T4F 1.0 08/01/2017    TSH 2.350 11/22/2021       No follow-ups on file.

## 2022-07-25 ENCOUNTER — LAB ENCOUNTER (OUTPATIENT)
Dept: LAB | Age: 67
End: 2022-07-25
Attending: FAMILY MEDICINE
Payer: MEDICARE

## 2022-07-25 DIAGNOSIS — Z79.01 LONG TERM CURRENT USE OF ANTICOAGULANT THERAPY: ICD-10-CM

## 2022-07-25 LAB
INR BLD: 2.13 (ref 0.8–1.2)
PROTHROMBIN TIME: 24 SECONDS (ref 11.6–14.8)

## 2022-07-25 PROCEDURE — 36415 COLL VENOUS BLD VENIPUNCTURE: CPT

## 2022-07-25 PROCEDURE — 85610 PROTHROMBIN TIME: CPT

## 2022-07-26 ENCOUNTER — ANTI-COAG VISIT (OUTPATIENT)
Dept: FAMILY MEDICINE CLINIC | Facility: CLINIC | Age: 67
End: 2022-07-26

## 2022-07-26 DIAGNOSIS — I48.21 PERMANENT ATRIAL FIBRILLATION (HCC): ICD-10-CM

## 2022-07-26 NOTE — PROGRESS NOTES
Patient informed of below. Expressed understanding. Return Appt given. Leonid Holland CMA, 07/26/22, 3:11 PM    Future appt: Your appointments     Date & Time Appointment Department [facility])    Aug 23, 2022 11:00 AM CDT Laboratory Visit with REF Enrico Camarenamer Reference Lab (EDW Ref Lab [place])        Sep 02, 2022  9:30 AM CDT Laboratory Visit with REF Enrico Simmer Reference Lab (EDW Ref Lab [place])        Sep 06, 2022  1:30 PM CDT Follow Up Visit with Isis Thompson MD 25 [facility] ([facility])            25 [facility] [place]  [address] [phone]  [phone] [facility] Reference Lab  EDW Ref Lab [place]  [address] [address]  [phone]        Last Appointment with provider:   Visit date not found  [facility] appointment [facility] [place]:  2/21/2022  Cholesterol, Total (mg/dL)   Date Value   11/22/2021 163     HDL Cholesterol (mg/dL)   Date Value   11/22/2021 34 (L)     LDL Cholesterol (mg/dL)   Date Value   11/22/2021 74     Triglycerides (mg/dL)   Date Value   11/22/2021 340 (H)     Lab Results   Component Value Date     (H) 02/17/2022    A1C 8.0 (H) 02/17/2022     Lab Results   Component Value Date    T4F 1.0 08/01/2017    TSH 2.350 11/22/2021       No follow-ups on file.

## 2022-07-26 NOTE — PROGRESS NOTES
MICHELLE Pham Juju Nurse Pool  MT out of office. Please change to anticoagulation flow sheet, goal of 2.0-3.0 per prior from when previously on warfarin. INR in goal of 2.13, continue current regimen and recheck in 4 weeks.

## 2022-08-11 NOTE — TELEPHONE ENCOUNTER
Last appt: 2/21/22 advised Return in 6 months (on 8/21/2022). Future appt: Your appointments     Date & Time Appointment Department [facility])    Aug 23, 2022 11:00 AM CDT Laboratory Visit with REF Kimberley Mujica Reference Lab (EDW Ref Lab [place])        Sep 02, 2022  9:30 AM CDT Laboratory Visit with REF Kimberley Mujica Reference Lab (EDW Ref Lab [place])        Sep 06, 2022  1:30 PM CDT Follow Up Visit with Vimal Iglesias MD 25 [facility] ([facility])            25 [facility], [address] [place] [address] [place]  [phone]  [phone] [place] Reference Lab  EDW Ref Lab [place]  [address] [address]  [phone]        Last Appointment with provider:   Visit date not found  Last appointment at [facility] [place]:  2/21/2022  Cholesterol, Total (mg/dL)   Date Value   11/22/2021 163     HDL Cholesterol (mg/dL)   Date Value   11/22/2021 34 (L)     LDL Cholesterol (mg/dL)   Date Value   11/22/2021 74     Triglycerides (mg/dL)   Date Value   11/22/2021 340 (H)     Lab Results   Component Value Date     (H) 02/17/2022    A1C 8.0 (H) 02/17/2022     Lab Results   Component Value Date    T4F 1.0 08/01/2017    TSH 2.350 11/22/2021       No follow-ups on file.

## 2022-08-12 RX ORDER — LISINOPRIL 10 MG/1
TABLET ORAL
Qty: 90 TABLET | Refills: 1 | Status: SHIPPED | OUTPATIENT
Start: 2022-08-12

## 2022-08-15 RX ORDER — SERTRALINE HYDROCHLORIDE 100 MG/1
TABLET, FILM COATED ORAL
Qty: 90 TABLET | Refills: 1 | Status: SHIPPED | OUTPATIENT
Start: 2022-08-15

## 2022-08-15 NOTE — TELEPHONE ENCOUNTER
Sertraline: 3/18/22    Future appt: Your appointments     Date & Time Appointment Department [facility])    Aug 23, 2022 11:00 AM CDT Laboratory Visit with REF Nadine Geller Reference Lab (EDW Ref Lab [facility])        Sep 02, 2022  9:30 AM CDT Laboratory Visit with REF Nadine Geller Reference Lab (EDW Ref Lab [facility])        Sep 06, 2022  1:30 PM CDT Follow Up Visit with Brittney Tello MD 25 [facility])            25 [facility] [place]  [phone]  [phone] Andrae Dee Reference Lab  EDW Ref Lab [place]  [address] [address]  [phone]        Last Appointment with provider:   2/21/2022  Last appointment at [facility] [place]:  2/21/2022  Cholesterol, Total (mg/dL)   Date Value   11/22/2021 163     HDL Cholesterol (mg/dL)   Date Value   11/22/2021 34 (L)     LDL Cholesterol (mg/dL)   Date Value   11/22/2021 74     Triglycerides (mg/dL)   Date Value   11/22/2021 340 (H)     Lab Results   Component Value Date     (H) 02/17/2022    A1C 8.0 (H) 02/17/2022     Lab Results   Component Value Date    T4F 1.0 08/01/2017    TSH 2.350 11/22/2021       No follow-ups on file.

## 2022-08-23 ENCOUNTER — LAB ENCOUNTER (OUTPATIENT)
Dept: LAB | Age: 67
End: 2022-08-23
Attending: FAMILY MEDICINE
Payer: MEDICARE

## 2022-08-23 DIAGNOSIS — Z79.01 LONG TERM CURRENT USE OF ANTICOAGULANT THERAPY: ICD-10-CM

## 2022-08-23 LAB
INR BLD: 2.02 (ref 0.85–1.16)
PROTHROMBIN TIME: 22.6 SECONDS (ref 11.6–14.8)

## 2022-08-23 PROCEDURE — 36415 COLL VENOUS BLD VENIPUNCTURE: CPT

## 2022-08-23 PROCEDURE — 85610 PROTHROMBIN TIME: CPT

## 2022-08-24 ENCOUNTER — ANTI-COAG VISIT (OUTPATIENT)
Dept: FAMILY MEDICINE CLINIC | Facility: CLINIC | Age: 67
End: 2022-08-24

## 2022-08-24 DIAGNOSIS — I48.21 PERMANENT ATRIAL FIBRILLATION (HCC): ICD-10-CM

## 2022-08-24 DIAGNOSIS — Z79.01 LONG TERM (CURRENT) USE OF ANTICOAGULANTS: ICD-10-CM

## 2022-08-24 NOTE — PROGRESS NOTES
Patient informed of below. Expressed understanding.   Leonora Roque, [facility], 08/24/22, 11:05 AM

## 2022-09-02 ENCOUNTER — LABORATORY ENCOUNTER (OUTPATIENT)
Dept: LAB | Age: 67
End: 2022-09-02
Attending: FAMILY MEDICINE
Payer: MEDICARE

## 2022-09-02 DIAGNOSIS — E78.49 FAMILIAL MULTIPLE LIPOPROTEIN-TYPE HYPERLIPIDEMIA: ICD-10-CM

## 2022-09-02 DIAGNOSIS — N18.2 CONTROLLED TYPE 2 DIABETES MELLITUS WITH STAGE 2 CHRONIC KIDNEY DISEASE, WITH LONG-TERM CURRENT USE OF INSULIN (HCC): ICD-10-CM

## 2022-09-02 DIAGNOSIS — I25.10 CORONARY ARTERY DISEASE INVOLVING NATIVE CORONARY ARTERY OF NATIVE HEART WITHOUT ANGINA PECTORIS: ICD-10-CM

## 2022-09-02 DIAGNOSIS — E66.01 CLASS 2 SEVERE OBESITY DUE TO EXCESS CALORIES WITH SERIOUS COMORBIDITY AND BODY MASS INDEX (BMI) OF 37.0 TO 37.9 IN ADULT (HCC): ICD-10-CM

## 2022-09-02 DIAGNOSIS — Z79.4 CONTROLLED TYPE 2 DIABETES MELLITUS WITH STAGE 2 CHRONIC KIDNEY DISEASE, WITH LONG-TERM CURRENT USE OF INSULIN (HCC): ICD-10-CM

## 2022-09-02 DIAGNOSIS — I48.21 PERMANENT ATRIAL FIBRILLATION (HCC): ICD-10-CM

## 2022-09-02 DIAGNOSIS — E11.22 CONTROLLED TYPE 2 DIABETES MELLITUS WITH STAGE 2 CHRONIC KIDNEY DISEASE, WITH LONG-TERM CURRENT USE OF INSULIN (HCC): ICD-10-CM

## 2022-09-02 LAB
ALBUMIN SERPL-MCNC: 3.4 G/DL (ref 3.4–5)
ALBUMIN/GLOB SERPL: 0.9 {RATIO} (ref 1–2)
ALP LIVER SERPL-CCNC: 92 U/L
ALT SERPL-CCNC: 46 U/L
ANION GAP SERPL CALC-SCNC: 8 MMOL/L (ref 0–18)
AST SERPL-CCNC: 29 U/L (ref 15–37)
BASOPHILS # BLD AUTO: 0.05 X10(3) UL (ref 0–0.2)
BASOPHILS NFR BLD AUTO: 0.6 %
BILIRUB SERPL-MCNC: 0.5 MG/DL (ref 0.1–2)
BUN BLD-MCNC: 20 MG/DL (ref 7–18)
CALCIUM BLD-MCNC: 8.6 MG/DL (ref 8.5–10.1)
CHLORIDE SERPL-SCNC: 108 MMOL/L (ref 98–112)
CHOLEST SERPL-MCNC: 164 MG/DL (ref ?–200)
CO2 SERPL-SCNC: 25 MMOL/L (ref 21–32)
COMPLEXED PSA SERPL-MCNC: 0.63 NG/ML (ref ?–4)
CREAT BLD-MCNC: 0.92 MG/DL
CREAT UR-SCNC: 106 MG/DL
EOSINOPHIL # BLD AUTO: 0.21 X10(3) UL (ref 0–0.7)
EOSINOPHIL NFR BLD AUTO: 2.7 %
ERYTHROCYTE [DISTWIDTH] IN BLOOD BY AUTOMATED COUNT: 17.9 %
EST. AVERAGE GLUCOSE BLD GHB EST-MCNC: 151 MG/DL (ref 68–126)
FASTING PATIENT LIPID ANSWER: YES
FASTING STATUS PATIENT QL REPORTED: YES
GFR SERPLBLD BASED ON 1.73 SQ M-ARVRAT: 92 ML/MIN/1.73M2 (ref 60–?)
GLOBULIN PLAS-MCNC: 4 G/DL (ref 2.8–4.4)
GLUCOSE BLD-MCNC: 138 MG/DL (ref 70–99)
HBA1C MFR BLD: 6.9 % (ref ?–5.7)
HCT VFR BLD AUTO: 48.3 %
HDLC SERPL-MCNC: 39 MG/DL (ref 40–59)
HGB BLD-MCNC: 14.8 G/DL
IMM GRANULOCYTES # BLD AUTO: 0.05 X10(3) UL (ref 0–1)
IMM GRANULOCYTES NFR BLD: 0.6 %
LDLC SERPL CALC-MCNC: 73 MG/DL (ref ?–100)
LYMPHOCYTES # BLD AUTO: 1.74 X10(3) UL (ref 1–4)
LYMPHOCYTES NFR BLD AUTO: 22 %
MCH RBC QN AUTO: 25.4 PG (ref 26–34)
MCHC RBC AUTO-ENTMCNC: 30.6 G/DL (ref 31–37)
MCV RBC AUTO: 83 FL
MICROALBUMIN UR-MCNC: 1.04 MG/DL
MICROALBUMIN/CREAT 24H UR-RTO: 9.8 UG/MG (ref ?–30)
MONOCYTES # BLD AUTO: 0.62 X10(3) UL (ref 0.1–1)
MONOCYTES NFR BLD AUTO: 7.8 %
NEUTROPHILS # BLD AUTO: 5.23 X10 (3) UL (ref 1.5–7.7)
NEUTROPHILS # BLD AUTO: 5.23 X10(3) UL (ref 1.5–7.7)
NEUTROPHILS NFR BLD AUTO: 66.3 %
NONHDLC SERPL-MCNC: 125 MG/DL (ref ?–130)
OSMOLALITY SERPL CALC.SUM OF ELEC: 297 MOSM/KG (ref 275–295)
PLATELET # BLD AUTO: 206 10(3)UL (ref 150–450)
POTASSIUM SERPL-SCNC: 3.6 MMOL/L (ref 3.5–5.1)
PROT SERPL-MCNC: 7.4 G/DL (ref 6.4–8.2)
RBC # BLD AUTO: 5.82 X10(6)UL
SODIUM SERPL-SCNC: 141 MMOL/L (ref 136–145)
TRIGL SERPL-MCNC: 326 MG/DL (ref 30–149)
TSI SER-ACNC: 3.51 MIU/ML (ref 0.36–3.74)
URATE SERPL-MCNC: 4.4 MG/DL
VLDLC SERPL CALC-MCNC: 51 MG/DL (ref 0–30)
WBC # BLD AUTO: 7.9 X10(3) UL (ref 4–11)

## 2022-09-02 PROCEDURE — 82570 ASSAY OF URINE CREATININE: CPT

## 2022-09-02 PROCEDURE — 3044F HG A1C LEVEL LT 7.0%: CPT | Performed by: FAMILY MEDICINE

## 2022-09-02 PROCEDURE — 80061 LIPID PANEL: CPT

## 2022-09-02 PROCEDURE — 3061F NEG MICROALBUMINURIA REV: CPT | Performed by: FAMILY MEDICINE

## 2022-09-02 PROCEDURE — 85025 COMPLETE CBC W/AUTO DIFF WBC: CPT

## 2022-09-02 PROCEDURE — 84443 ASSAY THYROID STIM HORMONE: CPT

## 2022-09-02 PROCEDURE — 84550 ASSAY OF BLOOD/URIC ACID: CPT

## 2022-09-02 PROCEDURE — 83036 HEMOGLOBIN GLYCOSYLATED A1C: CPT

## 2022-09-02 PROCEDURE — 80053 COMPREHEN METABOLIC PANEL: CPT

## 2022-09-02 PROCEDURE — 82043 UR ALBUMIN QUANTITATIVE: CPT

## 2022-09-02 PROCEDURE — 36415 COLL VENOUS BLD VENIPUNCTURE: CPT

## 2022-09-06 ENCOUNTER — OFFICE VISIT (OUTPATIENT)
Dept: FAMILY MEDICINE CLINIC | Facility: CLINIC | Age: 67
End: 2022-09-06
Payer: MEDICARE

## 2022-09-06 VITALS
HEART RATE: 87 BPM | WEIGHT: 286.38 LBS | SYSTOLIC BLOOD PRESSURE: 102 MMHG | BODY MASS INDEX: 38.79 KG/M2 | RESPIRATION RATE: 18 BRPM | HEIGHT: 72 IN | TEMPERATURE: 97 F | DIASTOLIC BLOOD PRESSURE: 60 MMHG | OXYGEN SATURATION: 96 %

## 2022-09-06 DIAGNOSIS — N18.2 CONTROLLED TYPE 2 DIABETES MELLITUS WITH STAGE 2 CHRONIC KIDNEY DISEASE, WITH LONG-TERM CURRENT USE OF INSULIN (HCC): ICD-10-CM

## 2022-09-06 DIAGNOSIS — I10 BENIGN ESSENTIAL HYPERTENSION: ICD-10-CM

## 2022-09-06 DIAGNOSIS — I48.21 PERMANENT ATRIAL FIBRILLATION (HCC): ICD-10-CM

## 2022-09-06 DIAGNOSIS — F33.1 DEPRESSION, MAJOR, RECURRENT, MODERATE (HCC): ICD-10-CM

## 2022-09-06 DIAGNOSIS — I25.10 CORONARY ARTERY DISEASE INVOLVING NATIVE CORONARY ARTERY OF NATIVE HEART WITHOUT ANGINA PECTORIS: Primary | ICD-10-CM

## 2022-09-06 DIAGNOSIS — E11.22 CONTROLLED TYPE 2 DIABETES MELLITUS WITH STAGE 2 CHRONIC KIDNEY DISEASE, WITH LONG-TERM CURRENT USE OF INSULIN (HCC): ICD-10-CM

## 2022-09-06 DIAGNOSIS — Z79.4 CONTROLLED TYPE 2 DIABETES MELLITUS WITH STAGE 2 CHRONIC KIDNEY DISEASE, WITH LONG-TERM CURRENT USE OF INSULIN (HCC): ICD-10-CM

## 2022-09-06 DIAGNOSIS — E78.49 FAMILIAL MULTIPLE LIPOPROTEIN-TYPE HYPERLIPIDEMIA: ICD-10-CM

## 2022-09-06 DIAGNOSIS — E66.01 CLASS 2 SEVERE OBESITY DUE TO EXCESS CALORIES WITH SERIOUS COMORBIDITY AND BODY MASS INDEX (BMI) OF 38.0 TO 38.9 IN ADULT (HCC): ICD-10-CM

## 2022-09-06 DIAGNOSIS — M25.512 PAIN IN JOINT OF LEFT SHOULDER: ICD-10-CM

## 2022-09-06 PROCEDURE — G0009 ADMIN PNEUMOCOCCAL VACCINE: HCPCS | Performed by: FAMILY MEDICINE

## 2022-09-06 PROCEDURE — 3008F BODY MASS INDEX DOCD: CPT | Performed by: FAMILY MEDICINE

## 2022-09-06 PROCEDURE — 99214 OFFICE O/P EST MOD 30 MIN: CPT | Performed by: FAMILY MEDICINE

## 2022-09-06 PROCEDURE — 90677 PCV20 VACCINE IM: CPT | Performed by: FAMILY MEDICINE

## 2022-09-06 PROCEDURE — 3074F SYST BP LT 130 MM HG: CPT | Performed by: FAMILY MEDICINE

## 2022-09-06 PROCEDURE — 3078F DIAST BP <80 MM HG: CPT | Performed by: FAMILY MEDICINE

## 2022-09-06 RX ORDER — DAPAGLIFLOZIN 10 MG/1
10 TABLET, FILM COATED ORAL DAILY
COMMUNITY
Start: 2022-08-15

## 2022-09-06 RX ORDER — LATANOPROST 50 UG/ML
1 SOLUTION/ DROPS OPHTHALMIC AS DIRECTED
COMMUNITY
Start: 2022-08-03

## 2022-09-06 RX ORDER — BLOOD-GLUCOSE METER
1 EACH MISCELLANEOUS AS DIRECTED
COMMUNITY
Start: 2022-07-18

## 2022-09-06 RX ORDER — BLOOD-GLUCOSE CONTROL, NORMAL
1 EACH MISCELLANEOUS AS DIRECTED
COMMUNITY
Start: 2022-07-18

## 2022-09-21 RX ORDER — DAPAGLIFLOZIN 10 MG/1
TABLET, FILM COATED ORAL
Qty: 90 TABLET | Refills: 1 | Status: SHIPPED | OUTPATIENT
Start: 2022-09-21

## 2022-09-21 NOTE — TELEPHONE ENCOUNTER
Farxiga: 8/15/22    Future appt: Your appointments     Date & Time Appointment Department [facility])    Sep 26, 2022 10:45 AM CDT Laboratory Visit with REF Delcie Gottron Reference Lab (EDW Ref Lab [facility])            Elijah Titus Reference Lab  EDW Ref Lab [place]  [phone]  [phone]        Last Appointment with provider:   9/6/2022  [facility]:  9/6/2022  Cholesterol, Total (mg/dL)   Date Value   09/02/2022 164     HDL Cholesterol (mg/dL)   Date Value   09/02/2022 39 (L)     LDL Cholesterol (mg/dL)   Date Value   09/02/2022 73     Triglycerides (mg/dL)   Date Value   09/02/2022 326 (H)     Lab Results   Component Value Date     (H) 09/02/2022    A1C 6.9 (H) 09/02/2022     Lab Results   Component Value Date    T4F 1.0 08/01/2017    TSH 3.510 09/02/2022       No follow-ups on file.

## 2022-09-22 ENCOUNTER — TELEPHONE (OUTPATIENT)
Dept: FAMILY MEDICINE CLINIC | Facility: CLINIC | Age: 67
End: 2022-09-22

## 2022-09-22 NOTE — TELEPHONE ENCOUNTER
Please advise lab orders    Future Appointments   Date Time Provider Anayeli Harrell   9/26/2022 10:45 AM REF [place] REF [facility] Ref Syc   3/3/2023  9:45 AM REF [place] REF [facility]   3/6/2023  9:45 AM Rosie Boyer MD [facility] [place] [facility] [place]

## 2022-09-23 NOTE — TELEPHONE ENCOUNTER
Lab orders have already been entered in the chart. Please use the orders that were entered for February.

## 2022-09-26 ENCOUNTER — TELEPHONE (OUTPATIENT)
Dept: FAMILY MEDICINE CLINIC | Facility: CLINIC | Age: 67
End: 2022-09-26

## 2022-09-26 ENCOUNTER — LAB ENCOUNTER (OUTPATIENT)
Dept: LAB | Age: 67
End: 2022-09-26
Attending: FAMILY MEDICINE

## 2022-09-26 DIAGNOSIS — I48.21 PERMANENT ATRIAL FIBRILLATION (HCC): Primary | ICD-10-CM

## 2022-09-26 DIAGNOSIS — I48.21 PERMANENT ATRIAL FIBRILLATION (HCC): ICD-10-CM

## 2022-09-26 LAB
INR BLD: 1.91 (ref 0.85–1.16)
PROTHROMBIN TIME: 21.7 SECONDS (ref 11.6–14.8)

## 2022-09-26 PROCEDURE — 36415 COLL VENOUS BLD VENIPUNCTURE: CPT

## 2022-09-26 PROCEDURE — 85610 PROTHROMBIN TIME: CPT

## 2022-09-27 ENCOUNTER — ANTI-COAG VISIT (OUTPATIENT)
Dept: FAMILY MEDICINE CLINIC | Facility: CLINIC | Age: 67
End: 2022-09-27

## 2022-09-27 DIAGNOSIS — I48.21 PERMANENT ATRIAL FIBRILLATION (HCC): ICD-10-CM

## 2022-09-27 DIAGNOSIS — Z79.01 LONG TERM (CURRENT) USE OF ANTICOAGULANTS: ICD-10-CM

## 2022-10-13 ENCOUNTER — IMMUNIZATION (OUTPATIENT)
Dept: FAMILY MEDICINE CLINIC | Facility: CLINIC | Age: 67
End: 2022-10-13
Payer: MEDICARE

## 2022-10-13 DIAGNOSIS — Z23 NEED FOR VACCINATION: Primary | ICD-10-CM

## 2022-10-13 PROCEDURE — 90662 IIV NO PRSV INCREASED AG IM: CPT | Performed by: FAMILY MEDICINE

## 2022-10-13 PROCEDURE — G0008 ADMIN INFLUENZA VIRUS VAC: HCPCS | Performed by: FAMILY MEDICINE

## 2022-10-26 ENCOUNTER — LABORATORY ENCOUNTER (OUTPATIENT)
Dept: LAB | Age: 67
End: 2022-10-26
Attending: FAMILY MEDICINE
Payer: MEDICARE

## 2022-10-26 DIAGNOSIS — I48.21 PERMANENT ATRIAL FIBRILLATION (HCC): ICD-10-CM

## 2022-10-26 LAB
INR BLD: 1.97 (ref 0.85–1.16)
PROTHROMBIN TIME: 22.3 SECONDS (ref 11.6–14.8)

## 2022-10-26 PROCEDURE — 36415 COLL VENOUS BLD VENIPUNCTURE: CPT

## 2022-10-26 PROCEDURE — 85610 PROTHROMBIN TIME: CPT

## 2022-10-27 ENCOUNTER — ANTI-COAG VISIT (OUTPATIENT)
Dept: FAMILY MEDICINE CLINIC | Facility: CLINIC | Age: 67
End: 2022-10-27

## 2022-10-27 DIAGNOSIS — Z79.01 LONG TERM (CURRENT) USE OF ANTICOAGULANTS: ICD-10-CM

## 2022-10-27 DIAGNOSIS — I48.21 PERMANENT ATRIAL FIBRILLATION (HCC): ICD-10-CM

## 2022-10-27 NOTE — PROGRESS NOTES
Please have patient take 10mg of warfarin today then resume his normal dosing otherwise. Recheck INR in 2 weeks.

## 2022-10-27 NOTE — PROGRESS NOTES
INR 1.97    Confirmed current Coumadin dose of 10mg M,W, F and 7.5mg daily the rest of the week. Please advise.

## 2022-11-10 DIAGNOSIS — E78.49 FAMILIAL MULTIPLE LIPOPROTEIN-TYPE HYPERLIPIDEMIA: Primary | ICD-10-CM

## 2022-11-10 RX ORDER — ROSUVASTATIN CALCIUM 10 MG/1
TABLET, COATED ORAL
Qty: 90 TABLET | Refills: 1 | Status: SHIPPED | OUTPATIENT
Start: 2022-11-10

## 2022-11-10 NOTE — TELEPHONE ENCOUNTER
Future appt: Your appointments     Date & Time Appointment Department [facility])    Mar 03, 2023  9:45 AM CST what is this with REF 1 Elisha Lopezjah Benites, [place] ([address] [place] [address])        Mar 06, 2023  9:45 AM CST MA Supervisit with Ashley Taveras MD 25 [facility] 2[facility] ([facility])            Cornelio Suarez [place]  EDW Ref Lab [place]  [phone]  435 [facility] [place]  [phone]  [phone]        Last Appointment with provider:   9/6/2022  [facility]:  9/6/2022  Cholesterol, Total (mg/dL)   Date Value   09/02/2022 164     HDL Cholesterol (mg/dL)   Date Value   09/02/2022 39 (L)     LDL Cholesterol (mg/dL)   Date Value   09/02/2022 73     Triglycerides (mg/dL)   Date Value   09/02/2022 326 (H)     Lab Results   Component Value Date     (H) 09/02/2022    A1C 6.9 (H) 09/02/2022     Lab Results   Component Value Date    T4F 1.0 08/01/2017    TSH 3.510 09/02/2022     Last RF:  5/16/2022    No follow-ups on file.

## 2022-11-18 ENCOUNTER — TELEPHONE (OUTPATIENT)
Dept: FAMILY MEDICINE CLINIC | Facility: CLINIC | Age: 67
End: 2022-11-18

## 2022-11-18 NOTE — TELEPHONE ENCOUNTER
Called pt back to check status. Pt states that nose bleed has stopped. No report of dizziness, nausea or feeling faint. No further questions or concerns. Pt urged to call if nose bleed starts again.

## 2022-11-18 NOTE — TELEPHONE ENCOUNTER
Pt calling in with nosebleed that started at 8am after blowing his nose. Pt reports that it is not stopping after 30 minutes of packing. Pt reports no dizziness or feeling faint. Pt is on blood thinner. Instructed pt to pack his nose, pinch bridge of nose, lean forward and place ice pack on back of neck for 20 minutes per MICHELLE Sen. Pt to call back with report or sooner if symptoms worsen.

## 2022-11-23 ENCOUNTER — LABORATORY ENCOUNTER (OUTPATIENT)
Dept: LAB | Age: 67
End: 2022-11-23
Attending: FAMILY MEDICINE
Payer: MEDICARE

## 2022-11-23 DIAGNOSIS — I48.21 PERMANENT ATRIAL FIBRILLATION (HCC): ICD-10-CM

## 2022-11-23 LAB
INR BLD: 2.15 (ref 0.85–1.16)
PROTHROMBIN TIME: 23.8 SECONDS (ref 11.6–14.8)

## 2022-11-23 PROCEDURE — 85610 PROTHROMBIN TIME: CPT

## 2022-11-23 PROCEDURE — 36415 COLL VENOUS BLD VENIPUNCTURE: CPT

## 2022-11-25 ENCOUNTER — ANTI-COAG VISIT (OUTPATIENT)
Dept: FAMILY MEDICINE CLINIC | Facility: CLINIC | Age: 67
End: 2022-11-25

## 2022-11-25 DIAGNOSIS — Z79.01 LONG TERM (CURRENT) USE OF ANTICOAGULANTS: ICD-10-CM

## 2022-11-25 DIAGNOSIS — I48.21 PERMANENT ATRIAL FIBRILLATION (HCC): ICD-10-CM

## 2022-11-25 NOTE — PROGRESS NOTES
Patient informed of below. Expressed understanding. Robert Apple, CAROL, 11/25/22, 9:25 AM    Future appt: Your appointments     Date & Time Appointment Department [facility])    Dec 07, 2022 10:00 AM CST what is this with REF Sahni [address] Chris. Dhruv Ver[facility] Lab, [place] (EDW Ref Lab [place])        Mar 03, 2023  9:45 AM CST what is this with REF 1 Eilsha Lopezjah [facility], [place] (EDW Ref Lab [place])        Mar 06, 2023  9:45 AM CST MA Supervisit with Silverio Ozuna MD 25 [facility] ([facility])            Cornelio Faustin [place]  EDW Ref Lab [place]  [phone]  435 [facility] [place]  [phone]  [phone]        Last Appointment with provider:   9/6/2022  Last appointment at [facility] [place]:  9/6/2022  Cholesterol, Total (mg/dL)   Date Value   09/02/2022 164     HDL Cholesterol (mg/dL)   Date Value   09/02/2022 39 (L)     LDL Cholesterol (mg/dL)   Date Value   09/02/2022 73     Triglycerides (mg/dL)   Date Value   09/02/2022 326 (H)     Lab Results   Component Value Date     (H) 09/02/2022    A1C 6.9 (H) 09/02/2022     Lab Results   Component Value Date    T4F 1.0 08/01/2017    TSH 3.510 09/02/2022       No follow-ups on file.

## 2022-11-25 NOTE — PROGRESS NOTES
Martita Wood, MICHELLE Coleman Severe Nurse Pool  Please change to anticoagulation visit. INR 2.15, in goal.   Continue current warfarin dosing, recheck in 2 weeks to ensure stability.

## 2022-11-28 RX ORDER — WARFARIN SODIUM 5 MG/1
TABLET ORAL
Qty: 60 TABLET | Refills: 11 | Status: SHIPPED | OUTPATIENT
Start: 2022-11-28

## 2022-11-29 RX ORDER — DILTIAZEM HYDROCHLORIDE 180 MG/1
CAPSULE, COATED, EXTENDED RELEASE ORAL
Qty: 90 CAPSULE | Refills: 1 | Status: SHIPPED | OUTPATIENT
Start: 2022-11-29

## 2022-11-29 NOTE — TELEPHONE ENCOUNTER
Diltiazem: 5/25/22    Future appt: Your appointments     Date & Time Appointment Department [facility])    Dec 07, 2022 10:00 AM CST what is this with REF 1 Cornelio Adames [place] (EDW Ref Lab [place])        Mar 03, 2023  9:45 AM CST what is this with REF 1 Cornelio Adames [place] (EDW Ref Lab [place])        Mar 06, 2023  9:45 AM CST MA Supervisit with Nat Carrillo MD [address] [place] [address] ([facility])            Cornelio Chaparro [place]  EDW Ref Lab [place]  [facility] [address]  435 [facility] [place]  [address] [phone]  [phone]        Last Appointment with provider:   Visit date not found  Last appointment at [facility] [place]:  9/6/2022  Cholesterol, Total (mg/dL)   Date Value   09/02/2022 164     HDL Cholesterol (mg/dL)   Date Value   09/02/2022 39 (L)     LDL Cholesterol (mg/dL)   Date Value   09/02/2022 73     Triglycerides (mg/dL)   Date Value   09/02/2022 326 (H)     Lab Results   Component Value Date     (H) 09/02/2022    A1C 6.9 (H) 09/02/2022     Lab Results   Component Value Date    T4F 1.0 08/01/2017    TSH 3.510 09/02/2022       No follow-ups on file.

## 2022-12-07 ENCOUNTER — LAB ENCOUNTER (OUTPATIENT)
Dept: LAB | Age: 67
End: 2022-12-07
Attending: FAMILY MEDICINE
Payer: MEDICARE

## 2022-12-07 ENCOUNTER — OFFICE VISIT (OUTPATIENT)
Dept: FAMILY MEDICINE CLINIC | Facility: CLINIC | Age: 67
End: 2022-12-07
Payer: MEDICARE

## 2022-12-07 ENCOUNTER — LABORATORY ENCOUNTER (OUTPATIENT)
Dept: LAB | Age: 67
End: 2022-12-07
Attending: FAMILY MEDICINE
Payer: MEDICARE

## 2022-12-07 VITALS
SYSTOLIC BLOOD PRESSURE: 112 MMHG | RESPIRATION RATE: 18 BRPM | HEART RATE: 80 BPM | TEMPERATURE: 98 F | HEIGHT: 72 IN | BODY MASS INDEX: 38.71 KG/M2 | DIASTOLIC BLOOD PRESSURE: 76 MMHG | OXYGEN SATURATION: 96 % | WEIGHT: 285.81 LBS

## 2022-12-07 DIAGNOSIS — R04.0 EPISTAXIS, RECURRENT: ICD-10-CM

## 2022-12-07 DIAGNOSIS — K92.1 BLACK TARRY STOOLS: ICD-10-CM

## 2022-12-07 DIAGNOSIS — I48.21 PERMANENT ATRIAL FIBRILLATION (HCC): ICD-10-CM

## 2022-12-07 DIAGNOSIS — R04.0 EPISTAXIS, RECURRENT: Primary | ICD-10-CM

## 2022-12-07 LAB
ALBUMIN SERPL-MCNC: 3.8 G/DL (ref 3.4–5)
ALBUMIN/GLOB SERPL: 1.1 {RATIO} (ref 1–2)
ALP LIVER SERPL-CCNC: 86 U/L
ALT SERPL-CCNC: 50 U/L
ANION GAP SERPL CALC-SCNC: 4 MMOL/L (ref 0–18)
AST SERPL-CCNC: 26 U/L (ref 15–37)
BASOPHILS # BLD AUTO: 0.05 X10(3) UL (ref 0–0.2)
BASOPHILS NFR BLD AUTO: 0.6 %
BILIRUB SERPL-MCNC: 0.3 MG/DL (ref 0.1–2)
BUN BLD-MCNC: 19 MG/DL (ref 7–18)
CALCIUM BLD-MCNC: 9.1 MG/DL (ref 8.5–10.1)
CHLORIDE SERPL-SCNC: 109 MMOL/L (ref 98–112)
CO2 SERPL-SCNC: 28 MMOL/L (ref 21–32)
CREAT BLD-MCNC: 0.85 MG/DL
EOSINOPHIL # BLD AUTO: 0.16 X10(3) UL (ref 0–0.7)
EOSINOPHIL NFR BLD AUTO: 1.8 %
ERYTHROCYTE [DISTWIDTH] IN BLOOD BY AUTOMATED COUNT: 16.2 %
FASTING STATUS PATIENT QL REPORTED: NO
GFR SERPLBLD BASED ON 1.73 SQ M-ARVRAT: 96 ML/MIN/1.73M2 (ref 60–?)
GLOBULIN PLAS-MCNC: 3.4 G/DL (ref 2.8–4.4)
GLUCOSE BLD-MCNC: 116 MG/DL (ref 70–99)
HCT VFR BLD AUTO: 47.1 %
HGB BLD-MCNC: 15 G/DL
IMM GRANULOCYTES # BLD AUTO: 0.05 X10(3) UL (ref 0–1)
IMM GRANULOCYTES NFR BLD: 0.6 %
INR BLD: 1.92 (ref 0.85–1.16)
LYMPHOCYTES # BLD AUTO: 2.45 X10(3) UL (ref 1–4)
LYMPHOCYTES NFR BLD AUTO: 27.4 %
MCH RBC QN AUTO: 26 PG (ref 26–34)
MCHC RBC AUTO-ENTMCNC: 31.8 G/DL (ref 31–37)
MCV RBC AUTO: 81.8 FL
MONOCYTES # BLD AUTO: 0.76 X10(3) UL (ref 0.1–1)
MONOCYTES NFR BLD AUTO: 8.5 %
NEUTROPHILS # BLD AUTO: 5.47 X10 (3) UL (ref 1.5–7.7)
NEUTROPHILS # BLD AUTO: 5.47 X10(3) UL (ref 1.5–7.7)
NEUTROPHILS NFR BLD AUTO: 61.1 %
OSMOLALITY SERPL CALC.SUM OF ELEC: 295 MOSM/KG (ref 275–295)
PLATELET # BLD AUTO: 215 10(3)UL (ref 150–450)
POTASSIUM SERPL-SCNC: 4.1 MMOL/L (ref 3.5–5.1)
PROT SERPL-MCNC: 7.2 G/DL (ref 6.4–8.2)
PROTHROMBIN TIME: 21.8 SECONDS (ref 11.6–14.8)
RBC # BLD AUTO: 5.76 X10(6)UL
SODIUM SERPL-SCNC: 141 MMOL/L (ref 136–145)
WBC # BLD AUTO: 8.9 X10(3) UL (ref 4–11)

## 2022-12-07 PROCEDURE — 36415 COLL VENOUS BLD VENIPUNCTURE: CPT

## 2022-12-07 PROCEDURE — 3074F SYST BP LT 130 MM HG: CPT

## 2022-12-07 PROCEDURE — 3078F DIAST BP <80 MM HG: CPT

## 2022-12-07 PROCEDURE — 85025 COMPLETE CBC W/AUTO DIFF WBC: CPT

## 2022-12-07 PROCEDURE — 80053 COMPREHEN METABOLIC PANEL: CPT

## 2022-12-07 PROCEDURE — 85610 PROTHROMBIN TIME: CPT

## 2022-12-07 PROCEDURE — 99213 OFFICE O/P EST LOW 20 MIN: CPT

## 2022-12-07 PROCEDURE — 3008F BODY MASS INDEX DOCD: CPT

## 2022-12-07 NOTE — PATIENT INSTRUCTIONS
Blood work today, to make sure that you haven't lost too much blood. Follow up on INR . ENT referral for chronic bloody nose.

## 2022-12-08 ENCOUNTER — ANTI-COAG VISIT (OUTPATIENT)
Dept: FAMILY MEDICINE CLINIC | Facility: CLINIC | Age: 67
End: 2022-12-08

## 2022-12-08 DIAGNOSIS — I48.21 PERMANENT ATRIAL FIBRILLATION (HCC): ICD-10-CM

## 2022-12-08 DIAGNOSIS — Z79.01 LONG TERM (CURRENT) USE OF ANTICOAGULANTS: ICD-10-CM

## 2022-12-08 NOTE — PROGRESS NOTES
Patient informed of the below results and recommendations.      Future Appointments   Date Time Provider Anayeli Harrell   12/15/2022  9:45 AM REF [facility] REF [facility]   3/3/2023  9:45 AM REF [facility] REF [facility]   3/6/2023  9:45 AM Kristal Workman MD [facility] [place]

## 2022-12-15 ENCOUNTER — LABORATORY ENCOUNTER (OUTPATIENT)
Dept: LAB | Age: 67
End: 2022-12-15
Attending: FAMILY MEDICINE
Payer: MEDICARE

## 2022-12-15 DIAGNOSIS — I48.21 PERMANENT ATRIAL FIBRILLATION (HCC): ICD-10-CM

## 2022-12-15 LAB
INR BLD: 2.26 (ref 0.85–1.16)
PROTHROMBIN TIME: 24.7 SECONDS (ref 11.6–14.8)

## 2022-12-15 PROCEDURE — 85610 PROTHROMBIN TIME: CPT

## 2022-12-15 PROCEDURE — 36415 COLL VENOUS BLD VENIPUNCTURE: CPT

## 2022-12-16 ENCOUNTER — ANTI-COAG VISIT (OUTPATIENT)
Dept: FAMILY MEDICINE CLINIC | Facility: CLINIC | Age: 67
End: 2022-12-16

## 2022-12-16 DIAGNOSIS — Z79.01 LONG TERM (CURRENT) USE OF ANTICOAGULANTS: ICD-10-CM

## 2022-12-16 DIAGNOSIS — I48.21 PERMANENT ATRIAL FIBRILLATION (HCC): ICD-10-CM

## 2022-12-16 NOTE — PROGRESS NOTES
Patient informed of below. Expressed understanding. Eleno Lucas CMA, 12/16/22, 1:05 PM    Future appt: Your appointments     Date & Time Appointment Department [facility])    Dec 29, 2022 10:30 AM CST what is this with REF [place] Cornelio Whitfield [place] ([facility] [place])        Mar 03, 2023  9:45 AM CST what is this with REF Cornelio Stewart [place] ([facility] [place])        Mar 06, 2023  9:45 AM CST MA Supervisit with Miles Soler MD [address] [place] [address] ([facility])            Cornelio Whitfield [place]  [facility] [place]  [address] [place] [address] Group [place]  [place] [phone]  [phone]        Last Appointment with provider:   Visit date not found  Last appointment at [facility] [place]:  12/7/2022  Cholesterol, Total (mg/dL)   Date Value   09/02/2022 164     HDL Cholesterol (mg/dL)   Date Value   09/02/2022 39 (L)     LDL Cholesterol (mg/dL)   Date Value   09/02/2022 73     Triglycerides (mg/dL)   Date Value   09/02/2022 326 (H)     Lab Results   Component Value Date     (H) 09/02/2022    A1C 6.9 (H) 09/02/2022     Lab Results   Component Value Date    T4F 1.0 08/01/2017    TSH 3.510 09/02/2022       No follow-ups on file.

## 2022-12-16 NOTE — PROGRESS NOTES
INR is 2.26 which is in the acceptable range. Plan: Continue the same dose of warfarin. Recheck INR again in 2 weeks.

## 2022-12-28 RX ORDER — LISINOPRIL 10 MG/1
TABLET ORAL
Qty: 90 TABLET | Refills: 1 | Status: SHIPPED | OUTPATIENT
Start: 2022-12-28 | End: 2022-12-29

## 2022-12-28 NOTE — TELEPHONE ENCOUNTER
Metformin: 7/5/22  Lisinopril: 8/12/22    Future appt: Your appointments     Date & Time Appointment Department [facility])    Dec 29, 2022 10:30 AM CST what is this with REF [address] [facility], Ludin Grimes ([facility] [place])        Mar 03, 2023  9:45 AM CST what is this with REF [address], Ludin Grimes ([facility] [place])        Mar 06, 2023  9:45 AM CST MA Supervisit with Shellie Gomez MD [address] [facility] ([facility])            Lieutenant Lundborg, [place], [place]  [facility] Ref [facility] [place]  [address] [facility] [address]  [address] [facility] [place]  [place] [address] [phone]  [phone]        Last Appointment with provider:   9/6/2022  Last appointment at [facility] [place]:  12/7/2022  Cholesterol, Total (mg/dL)   Date Value   09/02/2022 164     HDL Cholesterol (mg/dL)   Date Value   09/02/2022 39 (L)     LDL Cholesterol (mg/dL)   Date Value   09/02/2022 73     Triglycerides (mg/dL)   Date Value   09/02/2022 326 (H)     Lab Results   Component Value Date     (H) 09/02/2022    A1C 6.9 (H) 09/02/2022     Lab Results   Component Value Date    T4F 1.0 08/01/2017    TSH 3.510 09/02/2022       No follow-ups on file.

## 2022-12-29 ENCOUNTER — LABORATORY ENCOUNTER (OUTPATIENT)
Dept: LAB | Age: 67
End: 2022-12-29
Attending: FAMILY MEDICINE
Payer: MEDICARE

## 2022-12-29 DIAGNOSIS — E78.49 FAMILIAL MULTIPLE LIPOPROTEIN-TYPE HYPERLIPIDEMIA: ICD-10-CM

## 2022-12-29 DIAGNOSIS — I48.21 PERMANENT ATRIAL FIBRILLATION (HCC): ICD-10-CM

## 2022-12-29 LAB
INR BLD: 2.15 (ref 0.85–1.16)
PROTHROMBIN TIME: 23.8 SECONDS (ref 11.6–14.8)

## 2022-12-29 PROCEDURE — 36415 COLL VENOUS BLD VENIPUNCTURE: CPT

## 2022-12-29 PROCEDURE — 85610 PROTHROMBIN TIME: CPT

## 2022-12-29 NOTE — TELEPHONE ENCOUNTER
Fax received from [email or web address] order delivery for RFs of medications.       Future Appointments   Date Time Provider Anayeli Harrell   3/3/2023  9:45 AM REF [place] REF [facility]   3/6/2023  9:45 AM Ashley Taveras MD [facility] [place] [facility]

## 2022-12-30 ENCOUNTER — ANTI-COAG VISIT (OUTPATIENT)
Dept: FAMILY MEDICINE CLINIC | Facility: CLINIC | Age: 67
End: 2022-12-30

## 2022-12-30 DIAGNOSIS — Z79.01 LONG TERM (CURRENT) USE OF ANTICOAGULANTS: ICD-10-CM

## 2022-12-30 DIAGNOSIS — I48.21 PERMANENT ATRIAL FIBRILLATION (HCC): ICD-10-CM

## 2022-12-30 RX ORDER — HUMAN INSULIN 100 [IU]/ML
40 INJECTION, SUSPENSION SUBCUTANEOUS 2 TIMES DAILY
Qty: 45 ML | Refills: 1 | Status: SHIPPED | OUTPATIENT
Start: 2022-12-30

## 2022-12-30 RX ORDER — ROSUVASTATIN CALCIUM 10 MG/1
10 TABLET, COATED ORAL NIGHTLY
Qty: 90 TABLET | Refills: 1 | Status: SHIPPED | OUTPATIENT
Start: 2022-12-30

## 2022-12-30 RX ORDER — WARFARIN SODIUM 5 MG/1
TABLET ORAL
Qty: 60 TABLET | Refills: 1 | Status: SHIPPED | OUTPATIENT
Start: 2022-12-30

## 2022-12-30 RX ORDER — DILTIAZEM HYDROCHLORIDE 180 MG/1
180 CAPSULE, COATED, EXTENDED RELEASE ORAL DAILY
Qty: 90 CAPSULE | Refills: 1 | Status: SHIPPED | OUTPATIENT
Start: 2022-12-30

## 2022-12-30 RX ORDER — OMEPRAZOLE 20 MG/1
20 CAPSULE, DELAYED RELEASE ORAL
Qty: 90 CAPSULE | Refills: 1 | Status: SHIPPED | OUTPATIENT
Start: 2022-12-30

## 2022-12-30 RX ORDER — SERTRALINE HYDROCHLORIDE 100 MG/1
100 TABLET, FILM COATED ORAL DAILY
Qty: 90 TABLET | Refills: 1 | Status: SHIPPED | OUTPATIENT
Start: 2022-12-30

## 2022-12-30 RX ORDER — LISINOPRIL 10 MG/1
10 TABLET ORAL DAILY
Qty: 90 TABLET | Refills: 1 | Status: SHIPPED | OUTPATIENT
Start: 2022-12-30

## 2022-12-30 NOTE — PROGRESS NOTES
Mailbox full. Ramon Mendez CMA, 12/30/22, 11:51 AM    Patient informed of below. Expressed understanding. Future appt: Your appointments     Date & Time Appointment Department [facility])    Jan 26, 2023 10:00 AM CST what is this with REF [place] Cornelio Pappas [place] ([facility] [place])        Mar 03, 2023  9:45 AM CST what is this with REF Cornelio Boyd [place] ([facility] [place])        Mar 06, 2023  9:45 AM CST MA Supervisit with Karina Arzate MD [address] [place] [address] ([facility])            Cornelio Pappas [place]  [facility] [place]  [address] [place] [address] [place]  [place] [address] [phone]  [phone]        Last Appointment with provider:   Visit date not found  Last appointment at [facility] [place]:  12/7/2022  Cholesterol, Total (mg/dL)   Date Value   09/02/2022 164     HDL Cholesterol (mg/dL)   Date Value   09/02/2022 39 (L)     LDL Cholesterol (mg/dL)   Date Value   09/02/2022 73     Triglycerides (mg/dL)   Date Value   09/02/2022 326 (H)     Lab Results   Component Value Date     (H) 09/02/2022    A1C 6.9 (H) 09/02/2022     Lab Results   Component Value Date    T4F 1.0 08/01/2017    TSH 3.510 09/02/2022       No follow-ups on file.

## 2023-01-12 ENCOUNTER — OFFICE VISIT (OUTPATIENT)
Dept: FAMILY MEDICINE CLINIC | Facility: CLINIC | Age: 68
End: 2023-01-12
Payer: MEDICARE

## 2023-01-12 VITALS
OXYGEN SATURATION: 95 % | HEART RATE: 87 BPM | SYSTOLIC BLOOD PRESSURE: 136 MMHG | RESPIRATION RATE: 18 BRPM | TEMPERATURE: 98 F | DIASTOLIC BLOOD PRESSURE: 80 MMHG

## 2023-01-12 DIAGNOSIS — Z79.4 CONTROLLED TYPE 2 DIABETES MELLITUS WITH STAGE 2 CHRONIC KIDNEY DISEASE, WITH LONG-TERM CURRENT USE OF INSULIN (HCC): ICD-10-CM

## 2023-01-12 DIAGNOSIS — N18.2 CONTROLLED TYPE 2 DIABETES MELLITUS WITH STAGE 2 CHRONIC KIDNEY DISEASE, WITH LONG-TERM CURRENT USE OF INSULIN (HCC): ICD-10-CM

## 2023-01-12 DIAGNOSIS — R09.81 SINUS CONGESTION: Primary | ICD-10-CM

## 2023-01-12 DIAGNOSIS — E11.22 CONTROLLED TYPE 2 DIABETES MELLITUS WITH STAGE 2 CHRONIC KIDNEY DISEASE, WITH LONG-TERM CURRENT USE OF INSULIN (HCC): ICD-10-CM

## 2023-01-12 PROCEDURE — 3075F SYST BP GE 130 - 139MM HG: CPT | Performed by: NURSE PRACTITIONER

## 2023-01-12 PROCEDURE — 99213 OFFICE O/P EST LOW 20 MIN: CPT | Performed by: NURSE PRACTITIONER

## 2023-01-12 PROCEDURE — 3079F DIAST BP 80-89 MM HG: CPT | Performed by: NURSE PRACTITIONER

## 2023-01-12 NOTE — PATIENT INSTRUCTIONS
Take acetaminophen or ibuprofen for fever/discomfort  Drink plenty of fluids, warm liquids  Antihistamines for congestion  Use saline drops as needed or use neti pot/rinse (with distilled water)  Use cool mist vaporizer to keep area moist, especially at night  If throat is sore, gargle several times a day with warm salt water  Avoid smoking and exposure to secondhand smoke  Follow-up with the office if no improvement or symptoms worsen

## 2023-01-20 ENCOUNTER — TELEPHONE (OUTPATIENT)
Dept: FAMILY MEDICINE CLINIC | Facility: CLINIC | Age: 68
End: 2023-01-20

## 2023-01-20 PROCEDURE — 3051F HG A1C>EQUAL 7.0%<8.0%: CPT | Performed by: FAMILY MEDICINE

## 2023-01-20 NOTE — TELEPHONE ENCOUNTER
Patient states last night he had a left chest pain  Last night that was severe. Lowden like a knife a was going through his chest.  Pain went away. Did not call 911. States today he is having:  Left Arm/Chest pain. BP last night 147/85. Now: 144/83. Pulse: 70. No neck/jaw pain. Does have headache/slight dizziness. Patient does not have a ride to ER.   informed. 911 called for patient. Waited on the phone with patient until ambulance arrived. Patient's wife is also home awaiting ambulance. Wife does not drive. Ambulance has now arrived.   Carrillo Hernandez CMA, 01/20/23, 11:29 AM

## 2023-01-25 ENCOUNTER — TELEPHONE (OUTPATIENT)
Dept: FAMILY MEDICINE CLINIC | Facility: CLINIC | Age: 68
End: 2023-01-25

## 2023-01-25 NOTE — TELEPHONE ENCOUNTER
Appointment given for [facility] discharge. Danasharon CAROL Mohr, 01/25/23, 9:48 AM    Future appt: Your appointments     Date & Time Appointment Department [facility])    Jan 26, 2023 10:00 AM CST what is this with REF [place] Cornelio Au [place] ([facility] [place])        Jan 30, 2023  1:30 PM CST Exam - Established with Cloyd Schlatter, MD [address], [place] ([place])        Mar 03, 2023  9:45 AM CST what is this with REF [address], Cornelio, [place] ([facility] [place])        Mar 06, 2023  9:45 AM CST MA Supervisit with Cloyd Schlatter, MD [address], [place] ([place])            Cornelio Au, [place]  [facility] [place]  [address] Group [place]  [place] [address] [phone]  [phone]        Last Appointment with provider:   9/6/2022  Last appointment at [facility] [place]:  1/12/2023  Cholesterol, Total (mg/dL)   Date Value   09/02/2022 164     HDL Cholesterol (mg/dL)   Date Value   09/02/2022 39 (L)     LDL Cholesterol (mg/dL)   Date Value   09/02/2022 73     Triglycerides (mg/dL)   Date Value   09/02/2022 326 (H)     Lab Results   Component Value Date     (H) 09/02/2022    A1C 6.9 (H) 09/02/2022     Lab Results   Component Value Date    T4F 1.0 08/01/2017    TSH 3.510 09/02/2022       No follow-ups on file.

## 2023-01-26 ENCOUNTER — LABORATORY ENCOUNTER (OUTPATIENT)
Dept: LAB | Age: 68
End: 2023-01-26
Attending: FAMILY MEDICINE
Payer: MEDICARE

## 2023-01-26 DIAGNOSIS — I48.21 PERMANENT ATRIAL FIBRILLATION (HCC): ICD-10-CM

## 2023-01-26 LAB
INR BLD: 2.04 (ref 0.85–1.16)
INR: 2.04 (ref 0.8–1.2)
PROTHROMBIN TIME: 22.8 SECONDS (ref 11.6–14.8)

## 2023-01-26 PROCEDURE — 36415 COLL VENOUS BLD VENIPUNCTURE: CPT

## 2023-01-26 PROCEDURE — 85610 PROTHROMBIN TIME: CPT

## 2023-01-27 ENCOUNTER — ANTI-COAG VISIT (OUTPATIENT)
Dept: FAMILY MEDICINE CLINIC | Facility: CLINIC | Age: 68
End: 2023-01-27

## 2023-01-27 DIAGNOSIS — I48.21 PERMANENT ATRIAL FIBRILLATION (HCC): ICD-10-CM

## 2023-01-27 DIAGNOSIS — Z79.01 LONG TERM (CURRENT) USE OF ANTICOAGULANTS: ICD-10-CM

## 2023-01-27 NOTE — PROGRESS NOTES
Patient informed of the below results and recommendations. When confirming current Coumadin dose, patient states he is taking 10mg every Monday, Wednesday, and Friday and 7.5mg the rest of the week. This is different than what I show listed. Please advise.

## 2023-01-30 ENCOUNTER — OFFICE VISIT (OUTPATIENT)
Dept: FAMILY MEDICINE CLINIC | Facility: CLINIC | Age: 68
End: 2023-01-30
Payer: MEDICARE

## 2023-01-30 VITALS
TEMPERATURE: 98 F | BODY MASS INDEX: 38.25 KG/M2 | WEIGHT: 282.38 LBS | RESPIRATION RATE: 18 BRPM | HEIGHT: 72 IN | OXYGEN SATURATION: 95 % | DIASTOLIC BLOOD PRESSURE: 68 MMHG | SYSTOLIC BLOOD PRESSURE: 106 MMHG | HEART RATE: 82 BPM

## 2023-01-30 DIAGNOSIS — I10 BENIGN ESSENTIAL HYPERTENSION: ICD-10-CM

## 2023-01-30 DIAGNOSIS — N18.2 CONTROLLED TYPE 2 DIABETES MELLITUS WITH STAGE 2 CHRONIC KIDNEY DISEASE, WITH LONG-TERM CURRENT USE OF INSULIN (HCC): ICD-10-CM

## 2023-01-30 DIAGNOSIS — Z79.01 LONG TERM (CURRENT) USE OF ANTICOAGULANTS: ICD-10-CM

## 2023-01-30 DIAGNOSIS — R07.89 OTHER CHEST PAIN: Primary | ICD-10-CM

## 2023-01-30 DIAGNOSIS — K21.9 GASTROESOPHAGEAL REFLUX DISEASE WITHOUT ESOPHAGITIS: ICD-10-CM

## 2023-01-30 DIAGNOSIS — E11.22 CONTROLLED TYPE 2 DIABETES MELLITUS WITH STAGE 2 CHRONIC KIDNEY DISEASE, WITH LONG-TERM CURRENT USE OF INSULIN (HCC): ICD-10-CM

## 2023-01-30 DIAGNOSIS — Z79.4 CONTROLLED TYPE 2 DIABETES MELLITUS WITH STAGE 2 CHRONIC KIDNEY DISEASE, WITH LONG-TERM CURRENT USE OF INSULIN (HCC): ICD-10-CM

## 2023-01-30 PROCEDURE — 1111F DSCHRG MED/CURRENT MED MERGE: CPT | Performed by: FAMILY MEDICINE

## 2023-01-30 PROCEDURE — 3074F SYST BP LT 130 MM HG: CPT | Performed by: FAMILY MEDICINE

## 2023-01-30 PROCEDURE — 1126F AMNT PAIN NOTED NONE PRSNT: CPT | Performed by: FAMILY MEDICINE

## 2023-01-30 PROCEDURE — 99214 OFFICE O/P EST MOD 30 MIN: CPT | Performed by: FAMILY MEDICINE

## 2023-01-30 PROCEDURE — 3008F BODY MASS INDEX DOCD: CPT | Performed by: FAMILY MEDICINE

## 2023-01-30 PROCEDURE — 3078F DIAST BP <80 MM HG: CPT | Performed by: FAMILY MEDICINE

## 2023-01-31 NOTE — PROGRESS NOTES
His dorsum of warfarin is adequate. His INR is 1.7. Plan: Continue the same dose of warfarin. Recheck INR in 1 month. Dermal Autograft Text: The defect edges were debeveled with a #15 scalpel blade.  Given the location of the defect, shape of the defect and the proximity to free margins a dermal autograft was deemed most appropriate.  Using a sterile surgical marker, the primary defect shape was transferred to the donor site. The area thus outlined was incised deep to adipose tissue with a #15 scalpel blade.  The harvested graft was then trimmed of adipose and epidermal tissue until only dermis was left.  The skin graft was then placed in the primary defect and oriented appropriately.

## 2023-02-15 NOTE — TELEPHONE ENCOUNTER
Patient approved for [facility] Patient Assistance. Requesting Rx to [employer].

## 2023-02-27 ENCOUNTER — TELEPHONE (OUTPATIENT)
Dept: FAMILY MEDICINE CLINIC | Facility: CLINIC | Age: 68
End: 2023-02-27

## 2023-02-27 ENCOUNTER — ANTI-COAG VISIT (OUTPATIENT)
Dept: FAMILY MEDICINE CLINIC | Facility: CLINIC | Age: 68
End: 2023-02-27

## 2023-02-27 DIAGNOSIS — I48.21 PERMANENT ATRIAL FIBRILLATION (HCC): ICD-10-CM

## 2023-02-27 DIAGNOSIS — Z79.01 LONG TERM (CURRENT) USE OF ANTICOAGULANTS: ICD-10-CM

## 2023-02-27 LAB
ALBUMIN, SERUM: 3.7 G/DL
ALBUMIN/GLOBULIN RATIO: 1.1
ALKALINE PHOSPHATASE, SERUM: 89 IU/L
ALT (SGPT): 32 IU/L
ANION GAP: 10
AST (SGOT): 24 IU/L
BILIRUBIN, TOTAL: 0.4 MG/DL
BUN/CREATININE RATIO: 32.3
BUN: 28
CALCIUM, SERUM: 8.5 MG/DL
CHLORIDE, SERUM: 104
CO2: 25
CREATININE, SERUM: 0.86
EGFR IF NONAFRICN AM: >90
GLOBULIN: 3.4
GLUCOSE, SERUM: 209 MG/DL
HEMOGLOBIN A1C: 7.9 %
INR: 2.2 (ref 0.8–1.2)
POTASSIUM, SERUM: 3.9
SODIUM, SERUM: 138
TOTAL PROTEIN,SERUM: 7.1

## 2023-02-27 NOTE — PROGRESS NOTES
INR is 2.2 which is well within the acceptable range. Plan: Continue warfarin 10 mg every Monday, Wednesday, Friday; 7.5 mg all other days. Recheck INR again in 4 weeks.

## 2023-02-27 NOTE — PROGRESS NOTES
Patient informed of below. Expressed understanding.   Buck Wei, [facility], 02/27/23, 2:12 PM

## 2023-02-27 NOTE — PROGRESS NOTES
INR done at [facility]. Above Warfarin Dose showing is correct. Please advise.   Bernice Alfred CMA, 02/27/23, 1:26 PM

## 2023-02-28 ENCOUNTER — TELEPHONE (OUTPATIENT)
Dept: FAMILY MEDICINE CLINIC | Facility: CLINIC | Age: 68
End: 2023-02-28

## 2023-02-28 NOTE — TELEPHONE ENCOUNTER
Patient had question on [facility] Patient Assistance form. Questioned if  had received anything from  Them. Informed no. New Patient Assistance Form printed off for patient  To .   Duncan George CMA, 02/28/23, 2:23 PM

## 2023-03-03 ENCOUNTER — TELEPHONE (OUTPATIENT)
Dept: FAMILY MEDICINE CLINIC | Facility: CLINIC | Age: 68
End: 2023-03-03

## 2023-03-03 NOTE — TELEPHONE ENCOUNTER
Wants to know if you received lab requisition that was faxed on 3/1/23. Was 8 pages .  Ref # [id number]

## 2023-03-06 ENCOUNTER — OFFICE VISIT (OUTPATIENT)
Dept: FAMILY MEDICINE CLINIC | Facility: CLINIC | Age: 68
End: 2023-03-06
Payer: MEDICARE

## 2023-03-06 VITALS
RESPIRATION RATE: 18 BRPM | DIASTOLIC BLOOD PRESSURE: 68 MMHG | BODY MASS INDEX: 39.49 KG/M2 | HEIGHT: 71.5 IN | WEIGHT: 288.38 LBS | SYSTOLIC BLOOD PRESSURE: 118 MMHG | HEART RATE: 69 BPM | TEMPERATURE: 98 F | OXYGEN SATURATION: 97 %

## 2023-03-06 DIAGNOSIS — Z95.1 HX OF CABG: ICD-10-CM

## 2023-03-06 DIAGNOSIS — F33.1 DEPRESSION, MAJOR, RECURRENT, MODERATE (HCC): ICD-10-CM

## 2023-03-06 DIAGNOSIS — K21.9 GASTROESOPHAGEAL REFLUX DISEASE WITHOUT ESOPHAGITIS: ICD-10-CM

## 2023-03-06 DIAGNOSIS — R07.89 OTHER CHEST PAIN: ICD-10-CM

## 2023-03-06 DIAGNOSIS — I25.10 CORONARY ARTERY DISEASE INVOLVING NATIVE CORONARY ARTERY OF NATIVE HEART WITHOUT ANGINA PECTORIS: ICD-10-CM

## 2023-03-06 DIAGNOSIS — Z79.4 CONTROLLED TYPE 2 DIABETES MELLITUS WITH STAGE 2 CHRONIC KIDNEY DISEASE, WITH LONG-TERM CURRENT USE OF INSULIN (HCC): ICD-10-CM

## 2023-03-06 DIAGNOSIS — I48.21 PERMANENT ATRIAL FIBRILLATION (HCC): ICD-10-CM

## 2023-03-06 DIAGNOSIS — Z00.00 ENCOUNTER FOR ANNUAL HEALTH EXAMINATION: Primary | ICD-10-CM

## 2023-03-06 DIAGNOSIS — L30.9 DERMATITIS: ICD-10-CM

## 2023-03-06 DIAGNOSIS — E66.01 CLASS 2 SEVERE OBESITY DUE TO EXCESS CALORIES WITH SERIOUS COMORBIDITY AND BODY MASS INDEX (BMI) OF 38.0 TO 38.9 IN ADULT (HCC): ICD-10-CM

## 2023-03-06 DIAGNOSIS — G47.61 PERIODIC LIMB MOVEMENT DISORDER: ICD-10-CM

## 2023-03-06 DIAGNOSIS — N18.2 CONTROLLED TYPE 2 DIABETES MELLITUS WITH STAGE 2 CHRONIC KIDNEY DISEASE, WITH LONG-TERM CURRENT USE OF INSULIN (HCC): ICD-10-CM

## 2023-03-06 DIAGNOSIS — E78.49 FAMILIAL MULTIPLE LIPOPROTEIN-TYPE HYPERLIPIDEMIA: ICD-10-CM

## 2023-03-06 DIAGNOSIS — I10 BENIGN ESSENTIAL HYPERTENSION: ICD-10-CM

## 2023-03-06 DIAGNOSIS — Z79.01 LONG TERM (CURRENT) USE OF ANTICOAGULANTS: ICD-10-CM

## 2023-03-06 DIAGNOSIS — M25.512 PAIN IN JOINT OF LEFT SHOULDER: ICD-10-CM

## 2023-03-06 DIAGNOSIS — B35.1 DERMATOPHYTOSIS OF NAIL: ICD-10-CM

## 2023-03-06 DIAGNOSIS — E11.22 CONTROLLED TYPE 2 DIABETES MELLITUS WITH STAGE 2 CHRONIC KIDNEY DISEASE, WITH LONG-TERM CURRENT USE OF INSULIN (HCC): ICD-10-CM

## 2023-03-06 RX ORDER — DILTIAZEM HYDROCHLORIDE 180 MG/1
180 CAPSULE, COATED, EXTENDED RELEASE ORAL DAILY
Qty: 90 CAPSULE | Refills: 3 | Status: SHIPPED | OUTPATIENT
Start: 2023-03-06

## 2023-03-06 RX ORDER — SERTRALINE HYDROCHLORIDE 100 MG/1
100 TABLET, FILM COATED ORAL DAILY
Qty: 90 TABLET | Refills: 3 | Status: SHIPPED | OUTPATIENT
Start: 2023-03-06

## 2023-03-06 RX ORDER — LISINOPRIL 10 MG/1
10 TABLET ORAL DAILY
Qty: 90 TABLET | Refills: 3 | Status: SHIPPED | OUTPATIENT
Start: 2023-03-06

## 2023-03-06 RX ORDER — ROSUVASTATIN CALCIUM 10 MG/1
10 TABLET, COATED ORAL NIGHTLY
Qty: 90 TABLET | Refills: 3 | Status: SHIPPED | OUTPATIENT
Start: 2023-03-06

## 2023-03-06 RX ORDER — OMEPRAZOLE 20 MG/1
20 CAPSULE, DELAYED RELEASE ORAL
Qty: 90 CAPSULE | Refills: 3 | Status: SHIPPED | OUTPATIENT
Start: 2023-03-06

## 2023-03-08 ENCOUNTER — TELEPHONE (OUTPATIENT)
Dept: FAMILY MEDICINE CLINIC | Facility: CLINIC | Age: 68
End: 2023-03-08

## 2023-03-31 ENCOUNTER — TELEPHONE (OUTPATIENT)
Dept: FAMILY MEDICINE CLINIC | Facility: CLINIC | Age: 68
End: 2023-03-31

## 2023-03-31 DIAGNOSIS — Z79.01 LONG TERM (CURRENT) USE OF ANTICOAGULANTS: ICD-10-CM

## 2023-03-31 DIAGNOSIS — I48.21 PERMANENT ATRIAL FIBRILLATION (HCC): Primary | ICD-10-CM

## 2023-03-31 NOTE — TELEPHONE ENCOUNTER
Patient has [facility] [employer] [facility]. Will need to go to [facility] for Lab.     Please advise Standing INR Order for [facility].

## 2023-03-31 NOTE — TELEPHONE ENCOUNTER
----- Message from Georges Tobar, Sherly [place] Demetria sent at 2/27/2023  2:14 PM CST -----  Regarding: INR Due 3/27/23  Contact: [phone]  Isabela Wei is due for an INR test on 3/27/23.

## 2023-04-06 LAB
INR: 2.3
PT: 21 SEC (ref 9–11.5)

## 2023-04-07 ENCOUNTER — ANTI-COAG VISIT (OUTPATIENT)
Dept: FAMILY MEDICINE CLINIC | Facility: CLINIC | Age: 68
End: 2023-04-07

## 2023-04-07 DIAGNOSIS — I48.21 PERMANENT ATRIAL FIBRILLATION (HCC): ICD-10-CM

## 2023-04-07 DIAGNOSIS — Z79.01 LONG TERM (CURRENT) USE OF ANTICOAGULANTS: ICD-10-CM

## 2023-04-07 LAB — INR: 2.3 (ref 0.8–1.2)

## 2023-04-07 NOTE — PROGRESS NOTES
KARLI Escalante CMA, 04/07/23, 12:42 PM    Patient informed of below. Expressed understanding.   Amrit Escalante CMA, 04/07/23, 4:05 PM

## 2023-04-17 RX ORDER — WARFARIN SODIUM 5 MG/1
TABLET ORAL
Qty: 120 TABLET | Refills: 1 | Status: SHIPPED | OUTPATIENT
Start: 2023-04-17

## 2023-04-17 NOTE — TELEPHONE ENCOUNTER
Future appt: Your appointments     Date & Time Appointment Department [facility])    Sep 11, 2023  9:45 AM CDT Follow Up Visit with Nat Carrillo MD [address] [place] [address], Dia Gomez)            [address] [place] [address] Group [place]  [facility], [facility] [address] [phone]  [phone]        Last Appointment with provider:   3/6/2023  [place] [facility] [place]:  3/6/2023  Cholesterol, Total (mg/dL)   Date Value   09/02/2022 164     HDL Cholesterol (mg/dL)   Date Value   09/02/2022 39 (L)     LDL Cholesterol (mg/dL)   Date Value   09/02/2022 73     Triglycerides (mg/dL)   Date Value   09/02/2022 326 (H)     Lab Results   Component Value Date     (H) 09/02/2022    A1C 7.9 02/27/2023     Lab Results   Component Value Date    T4F 1.0 08/01/2017    TSH 3.510 09/02/2022     Last RF:  12/30/2022    No follow-ups on file.
all other ROS negative except as per HPI

## 2023-05-03 ENCOUNTER — TELEPHONE (OUTPATIENT)
Dept: FAMILY MEDICINE CLINIC | Facility: CLINIC | Age: 68
End: 2023-05-03

## 2023-05-03 NOTE — TELEPHONE ENCOUNTER
Patient Assistance Insulin has arrived. Insulin in in the [facility] with his  Name on the bag. Mailbox full.   Cheng Lopez CMA, 05/03/23, 5:03 PM

## 2023-05-23 ENCOUNTER — TELEPHONE (OUTPATIENT)
Dept: FAMILY MEDICINE CLINIC | Facility: CLINIC | Age: 68
End: 2023-05-23

## 2023-05-23 NOTE — TELEPHONE ENCOUNTER
Patient informed overdue INR. Patient states he is out of town this week. Will go to [address] [place] [address] Tuesday 5/3023 for INR.

## 2023-05-23 NOTE — TELEPHONE ENCOUNTER
----- Message from Mel Serrano, Sherly [place] Demetria sent at 4/7/2023  4:05 PM CDT -----  Regarding: INR Due 5/5/23  Contact: [phone]  German Bradley is due for an INR test on 5/5/23.

## 2023-06-12 LAB — INR: 2.4 (ref 0.8–1.2)

## 2023-06-15 ENCOUNTER — TELEPHONE (OUTPATIENT)
Dept: FAMILY MEDICINE CLINIC | Facility: CLINIC | Age: 68
End: 2023-06-15

## 2023-06-15 NOTE — TELEPHONE ENCOUNTER
----- Message from Leandra Marquez, Sherly [place] sent at 4/7/2023  4:05 PM CDT -----  Regarding: INR Due 5/5/23  Contact: [phone]  Ronny Valentina is due for an INR test on 5/5/23.

## 2023-06-15 NOTE — TELEPHONE ENCOUNTER
Sydnie Santos, [facility]  Phone Number: [phone]     Ronel Bella is due for an INR test on 5/5/23.

## 2023-06-19 ENCOUNTER — ANTI-COAG VISIT (OUTPATIENT)
Dept: FAMILY MEDICINE CLINIC | Facility: CLINIC | Age: 68
End: 2023-06-19

## 2023-06-19 DIAGNOSIS — Z79.01 LONG TERM (CURRENT) USE OF ANTICOAGULANTS: ICD-10-CM

## 2023-06-19 DIAGNOSIS — I48.21 PERMANENT ATRIAL FIBRILLATION (HCC): ICD-10-CM

## 2023-06-19 NOTE — PROGRESS NOTES
INR is 2.4 which is in the acceptable range. Plan: Continue the same dose of warfarin. Take 10 mg every Monday, Wednesday, Friday; 7.5 mg all other days. Recheck INR again in 4 weeks.

## 2023-06-19 NOTE — TELEPHONE ENCOUNTER
Spoke with patient. [facility] he had his INR done last Monday, 6/12/2023, at [facility].  Informed patient I will call for results. Called to [place] with [facility] who will fax over the report. [facility] patient's INR was 2.4 and PT was 23.5. See anticoag encounter started.

## 2023-06-19 NOTE — PROGRESS NOTES
Spoke with Fatou at [facility].  INR done 6/12/2023. INR: 2.4  PT: 23.5    Please advise.

## 2023-07-24 ENCOUNTER — TELEPHONE (OUTPATIENT)
Dept: FAMILY MEDICINE CLINIC | Facility: CLINIC | Age: 68
End: 2023-07-24

## 2023-07-24 NOTE — TELEPHONE ENCOUNTER
Notification received pt due for INR. Advised pt to complete at earliest convenience. Pt understands and is agreeable.

## 2023-07-25 ENCOUNTER — TELEPHONE (OUTPATIENT)
Dept: FAMILY MEDICINE CLINIC | Facility: CLINIC | Age: 68
End: 2023-07-25

## 2023-07-25 NOTE — TELEPHONE ENCOUNTER
Pt overdue for INR  Last INR checked 6/19/23. Attempted to call pt- not able to leave a message. Called pt's wife- consent on file. Dayan [place] agreed to relay message to pt. Dayan Bay stated she would have pt call tomorrow.

## 2023-08-01 LAB
INR: 2.3
PT: 22.6 SEC (ref 9–11.5)

## 2023-08-08 ENCOUNTER — TELEPHONE (OUTPATIENT)
Dept: FAMILY MEDICINE CLINIC | Facility: CLINIC | Age: 68
End: 2023-08-08

## 2023-08-08 ENCOUNTER — ANTI-COAG VISIT (OUTPATIENT)
Dept: FAMILY MEDICINE CLINIC | Facility: CLINIC | Age: 68
End: 2023-08-08

## 2023-08-08 DIAGNOSIS — Z79.4 CONTROLLED TYPE 2 DIABETES MELLITUS WITH STAGE 2 CHRONIC KIDNEY DISEASE, WITH LONG-TERM CURRENT USE OF INSULIN (HCC): ICD-10-CM

## 2023-08-08 DIAGNOSIS — F33.1 DEPRESSION, MAJOR, RECURRENT, MODERATE (HCC): ICD-10-CM

## 2023-08-08 DIAGNOSIS — Z12.5 SCREENING FOR MALIGNANT NEOPLASM OF PROSTATE: ICD-10-CM

## 2023-08-08 DIAGNOSIS — I48.21 PERMANENT ATRIAL FIBRILLATION (HCC): ICD-10-CM

## 2023-08-08 DIAGNOSIS — Z79.01 LONG TERM (CURRENT) USE OF ANTICOAGULANTS: Primary | ICD-10-CM

## 2023-08-08 DIAGNOSIS — N18.2 CONTROLLED TYPE 2 DIABETES MELLITUS WITH STAGE 2 CHRONIC KIDNEY DISEASE, WITH LONG-TERM CURRENT USE OF INSULIN (HCC): ICD-10-CM

## 2023-08-08 DIAGNOSIS — E66.01 CLASS 2 SEVERE OBESITY DUE TO EXCESS CALORIES WITH SERIOUS COMORBIDITY AND BODY MASS INDEX (BMI) OF 38.0 TO 38.9 IN ADULT: ICD-10-CM

## 2023-08-08 DIAGNOSIS — E78.49 FAMILIAL MULTIPLE LIPOPROTEIN-TYPE HYPERLIPIDEMIA: ICD-10-CM

## 2023-08-08 DIAGNOSIS — I48.21 PERMANENT ATRIAL FIBRILLATION (HCC): Primary | ICD-10-CM

## 2023-08-08 DIAGNOSIS — E11.22 CONTROLLED TYPE 2 DIABETES MELLITUS WITH STAGE 2 CHRONIC KIDNEY DISEASE, WITH LONG-TERM CURRENT USE OF INSULIN (HCC): ICD-10-CM

## 2023-08-08 LAB — INR: 2.3 (ref 0.8–1.2)

## 2023-08-08 PROCEDURE — 93793 ANTICOAG MGMT PT WARFARIN: CPT | Performed by: NURSE PRACTITIONER

## 2023-08-08 NOTE — PROGRESS NOTES
ML Isamar Dunlap CMA, 08/08/23, 12:56 PM    Patient informed of below. Expressed understanding.   Isamar Dunlap CMA, 08/08/23, 2:52 PM

## 2023-08-08 NOTE — PROGRESS NOTES
Continue 10 mg Monday, Wednesday, Friday and 7.5 mg daily the rest of the week repeat INR in 1 month

## 2023-08-08 NOTE — PROGRESS NOTES
Phone patient regarding overdue INR. States INR was done 7/31/23. Phoned [facility]-----  Result faxed to office.

## 2023-08-18 DIAGNOSIS — E78.49 FAMILIAL MULTIPLE LIPOPROTEIN-TYPE HYPERLIPIDEMIA: ICD-10-CM

## 2023-08-18 RX ORDER — ROSUVASTATIN CALCIUM 10 MG/1
10 TABLET, COATED ORAL NIGHTLY
Qty: 90 TABLET | Refills: 1 | Status: SHIPPED | OUTPATIENT
Start: 2023-08-18

## 2023-08-18 NOTE — TELEPHONE ENCOUNTER
Future appt: Your appointments       Date & Time Appointment Department [facility])    Sep 11, 2023  9:45 AM CDT Follow Up Visit with MD Doris Godfrey Blondell Calandra ([facility])              Doris Mccurdy, [facility]  [place] [address] [phone]  [phone]          Last Appointment with provider:   3/6/2023 for annual physical.  Last appointment at [facility] [place]:  3/6/2023  Cholesterol, Total (mg/dL)   Date Value   09/02/2022 164     HDL Cholesterol (mg/dL)   Date Value   09/02/2022 39 (L)     LDL Cholesterol (mg/dL)   Date Value   09/02/2022 73     Triglycerides (mg/dL)   Date Value   09/02/2022 326 (H)     Lab Results   Component Value Date     (H) 09/02/2022    A1C 7.9 02/27/2023     Lab Results   Component Value Date    T4F 1.0 08/01/2017    TSH 3.510 09/02/2022       No follow-ups on file.

## 2023-09-08 ENCOUNTER — TELEPHONE (OUTPATIENT)
Dept: FAMILY MEDICINE CLINIC | Facility: CLINIC | Age: 68
End: 2023-09-08

## 2023-09-08 LAB
ABSOLUTE BASOPHILS: 34 CELLS/UL (ref 0–200)
ABSOLUTE EOSINOPHILS: 187 CELLS/UL (ref 15–500)
ABSOLUTE LYMPHOCYTES: 1700 CELLS/UL (ref 850–3900)
ABSOLUTE MONOCYTES: 748 CELLS/UL (ref 200–950)
ABSOLUTE NEUTROPHILS: 5831 CELLS/UL (ref 1500–7800)
ALBUMIN/GLOBULIN RATIO: 1.5 (CALC) (ref 1–2.5)
ALBUMIN: 4.1 G/DL (ref 3.6–5.1)
ALKALINE PHOSPHATASE: 78 U/L (ref 35–144)
ALT: 32 U/L (ref 9–46)
AST: 28 U/L (ref 10–35)
BASOPHILS: 0.4 %
BILIRUBIN, TOTAL: 0.6 MG/DL (ref 0.2–1.2)
BUN: 15 MG/DL (ref 7–25)
CALCIUM: 8.9 MG/DL (ref 8.6–10.3)
CARBON DIOXIDE: 24 MMOL/L (ref 20–32)
CHLORIDE: 106 MMOL/L (ref 98–110)
CHOL/HDLC RATIO: 4.8 (CALC)
CHOLESTEROL, TOTAL: 182 MG/DL
CREATININE, RANDOM URINE: 88 MG/DL (ref 20–320)
CREATININE: 0.71 MG/DL (ref 0.7–1.35)
EGFR: 101 ML/MIN/1.73M2
EOSINOPHILS: 2.2 %
GLOBULIN: 2.7 G/DL (CALC) (ref 1.9–3.7)
GLUCOSE: 136 MG/DL (ref 65–99)
HDL CHOLESTEROL: 38 MG/DL
HEMATOCRIT: 45.5 % (ref 38.5–50)
HEMOGLOBIN A1C: 7.3 % OF TOTAL HGB
HEMOGLOBIN: 14.9 G/DL (ref 13.2–17.1)
LDL-CHOLESTEROL: 96 MG/DL (CALC)
LYMPHOCYTES: 20 %
MCH: 25.5 PG (ref 27–33)
MCHC: 32.7 G/DL (ref 32–36)
MCV: 77.8 FL (ref 80–100)
MICROALBUMIN/CREATININE RATIO, RANDOM URINE: 6 MCG/MG CREAT
MICROALBUMIN: 0.5 MG/DL
MONOCYTES: 8.8 %
MPV: 12 FL (ref 7.5–12.5)
NEUTROPHILS: 68.6 %
NON-HDL CHOLESTEROL: 144 MG/DL (CALC)
PLATELET COUNT: 227 THOUSAND/UL (ref 140–400)
POTASSIUM: 4 MMOL/L (ref 3.5–5.3)
PROTEIN, TOTAL: 6.8 G/DL (ref 6.1–8.1)
PSA, TOTAL: 0.47 NG/ML
RDW: 16.5 % (ref 11–15)
RED BLOOD CELL COUNT: 5.85 MILLION/UL (ref 4.2–5.8)
SODIUM: 138 MMOL/L (ref 135–146)
TRIGLYCERIDES: 361 MG/DL
TSH: 4.2 MIU/L (ref 0.4–4.5)
WHITE BLOOD CELL COUNT: 8.5 THOUSAND/UL (ref 3.8–10.8)

## 2023-09-08 NOTE — TELEPHONE ENCOUNTER
Had labs done at [facility] 8/7, would like you to get the report   Future Appointments   Date Time Provider Anayeli Harrell   9/11/2023  9:45 AM María Yoder MD [facility] [place] [facility]

## 2023-09-08 NOTE — TELEPHONE ENCOUNTER
Patient informed Lab Results will be reviewed  At time of appointment Monday/agreed. Patient informed [facility] did not draw INR. Patient will go back to have drawn.

## 2023-09-09 ENCOUNTER — ANTI-COAG VISIT (OUTPATIENT)
Dept: FAMILY MEDICINE CLINIC | Facility: CLINIC | Age: 68
End: 2023-09-09

## 2023-09-09 DIAGNOSIS — Z79.01 LONG TERM (CURRENT) USE OF ANTICOAGULANTS: Primary | ICD-10-CM

## 2023-09-09 DIAGNOSIS — I48.21 PERMANENT ATRIAL FIBRILLATION (HCC): ICD-10-CM

## 2023-09-09 LAB
INR: 1.7
INR: 1.7 (ref 0.8–1.2)
PT: 17.6 SEC (ref 9–11.5)

## 2023-09-09 PROCEDURE — 93793 ANTICOAG MGMT PT WARFARIN: CPT | Performed by: NURSE PRACTITIONER

## 2023-09-09 NOTE — PROGRESS NOTES
Dr. Katie Solis is out of the office today. INR is low. Take 15 mg today and then resume schedule. Recheck in 3 days.

## 2023-09-11 ENCOUNTER — OFFICE VISIT (OUTPATIENT)
Dept: FAMILY MEDICINE CLINIC | Facility: CLINIC | Age: 68
End: 2023-09-11
Payer: MEDICARE

## 2023-09-11 VITALS
OXYGEN SATURATION: 97 % | HEART RATE: 96 BPM | BODY MASS INDEX: 39.34 KG/M2 | DIASTOLIC BLOOD PRESSURE: 78 MMHG | SYSTOLIC BLOOD PRESSURE: 136 MMHG | WEIGHT: 281 LBS | TEMPERATURE: 98 F | RESPIRATION RATE: 18 BRPM | HEIGHT: 71 IN

## 2023-09-11 DIAGNOSIS — F33.1 DEPRESSION, MAJOR, RECURRENT, MODERATE (HCC): ICD-10-CM

## 2023-09-11 DIAGNOSIS — Z79.4 CONTROLLED TYPE 2 DIABETES MELLITUS WITH STAGE 2 CHRONIC KIDNEY DISEASE, WITH LONG-TERM CURRENT USE OF INSULIN (HCC): ICD-10-CM

## 2023-09-11 DIAGNOSIS — N18.2 CONTROLLED TYPE 2 DIABETES MELLITUS WITH STAGE 2 CHRONIC KIDNEY DISEASE, WITH LONG-TERM CURRENT USE OF INSULIN (HCC): ICD-10-CM

## 2023-09-11 DIAGNOSIS — I25.10 CORONARY ARTERY DISEASE INVOLVING NATIVE CORONARY ARTERY OF NATIVE HEART WITHOUT ANGINA PECTORIS: ICD-10-CM

## 2023-09-11 DIAGNOSIS — I48.21 PERMANENT ATRIAL FIBRILLATION (HCC): ICD-10-CM

## 2023-09-11 DIAGNOSIS — E66.01 CLASS 2 SEVERE OBESITY DUE TO EXCESS CALORIES WITH SERIOUS COMORBIDITY AND BODY MASS INDEX (BMI) OF 38.0 TO 38.9 IN ADULT: ICD-10-CM

## 2023-09-11 DIAGNOSIS — E11.22 CONTROLLED TYPE 2 DIABETES MELLITUS WITH STAGE 2 CHRONIC KIDNEY DISEASE, WITH LONG-TERM CURRENT USE OF INSULIN (HCC): ICD-10-CM

## 2023-09-11 DIAGNOSIS — Z79.01 LONG TERM (CURRENT) USE OF ANTICOAGULANTS: ICD-10-CM

## 2023-09-11 DIAGNOSIS — I10 BENIGN ESSENTIAL HYPERTENSION: ICD-10-CM

## 2023-09-11 DIAGNOSIS — S93.422S SPRAIN, ANKLE JOINT, MEDIAL, LEFT, SEQUELA: Primary | ICD-10-CM

## 2023-09-11 RX ORDER — HUMAN INSULIN 100 [IU]/ML
INJECTION, SUSPENSION SUBCUTANEOUS
Qty: 45 ML | Refills: 1 | COMMUNITY
Start: 2023-09-11

## 2023-09-13 ENCOUNTER — ANTI-COAG VISIT (OUTPATIENT)
Dept: FAMILY MEDICINE CLINIC | Facility: CLINIC | Age: 68
End: 2023-09-13

## 2023-09-13 DIAGNOSIS — Z79.01 LONG TERM (CURRENT) USE OF ANTICOAGULANTS: Primary | ICD-10-CM

## 2023-09-13 DIAGNOSIS — I48.21 PERMANENT ATRIAL FIBRILLATION (HCC): ICD-10-CM

## 2023-09-13 LAB
INR: 2.6
INR: 2.6 (ref 0.8–1.2)
PT: 25.7 SEC (ref 9–11.5)

## 2023-09-18 RX ORDER — WARFARIN SODIUM 5 MG/1
TABLET ORAL
Qty: 120 TABLET | Refills: 1 | Status: SHIPPED | OUTPATIENT
Start: 2023-09-18

## 2023-09-25 ENCOUNTER — TELEPHONE (OUTPATIENT)
Dept: FAMILY MEDICINE CLINIC | Facility: CLINIC | Age: 68
End: 2023-09-25

## 2023-09-26 ENCOUNTER — ANTI-COAG VISIT (OUTPATIENT)
Dept: FAMILY MEDICINE CLINIC | Facility: CLINIC | Age: 68
End: 2023-09-26

## 2023-09-26 DIAGNOSIS — I48.21 PERMANENT ATRIAL FIBRILLATION (HCC): ICD-10-CM

## 2023-09-26 DIAGNOSIS — Z79.01 LONG TERM (CURRENT) USE OF ANTICOAGULANTS: Primary | ICD-10-CM

## 2023-09-26 LAB
INR: 2.2
INR: 2.2 (ref 0.8–1.2)
PT: 22.1 SEC (ref 9–11.5)

## 2023-09-26 NOTE — PROGRESS NOTES
INR is 2.2. This is in the acceptable range. Plan: Continue warfarin 10 mg every Monday, Wednesday, Friday; 7.5 mg all other days. Recheck INR again in 1 month.

## 2023-10-31 LAB
INR: 2.4
PT: 23.5 SEC (ref 9–11.5)

## 2023-12-02 LAB
INR: 2
PT: 20.5 SEC (ref 9–11.5)

## 2024-01-03 LAB
INR: 1.7
PT: 17.5 SEC (ref 9–11.5)
